# Patient Record
Sex: FEMALE | Race: WHITE | Employment: PART TIME | URBAN - METROPOLITAN AREA
[De-identification: names, ages, dates, MRNs, and addresses within clinical notes are randomized per-mention and may not be internally consistent; named-entity substitution may affect disease eponyms.]

---

## 2017-01-19 ENCOUNTER — OFFICE VISIT (OUTPATIENT)
Dept: INTERNAL MEDICINE CLINIC | Facility: CLINIC | Age: 72
End: 2017-01-19

## 2017-01-19 VITALS
SYSTOLIC BLOOD PRESSURE: 120 MMHG | WEIGHT: 155.75 LBS | BODY MASS INDEX: 28 KG/M2 | TEMPERATURE: 99 F | HEART RATE: 66 BPM | DIASTOLIC BLOOD PRESSURE: 76 MMHG | OXYGEN SATURATION: 98 %

## 2017-01-19 DIAGNOSIS — R19.7 DIARRHEA, UNSPECIFIED TYPE: Primary | ICD-10-CM

## 2017-01-19 DIAGNOSIS — J06.9 URI, ACUTE: ICD-10-CM

## 2017-01-19 DIAGNOSIS — K58.9 IRRITABLE BOWEL SYNDROME, UNSPECIFIED TYPE: ICD-10-CM

## 2017-01-19 PROCEDURE — 99214 OFFICE O/P EST MOD 30 MIN: CPT | Performed by: FAMILY MEDICINE

## 2017-01-19 NOTE — PROGRESS NOTES
CHIEF COMPLAINT:   Patient presents with:  Diarrhea: intermittent x 2 weeks associated with abdominal soreness, no nausea, no vomiting. HPI:   Caleb Danielson is a 70year old female who presents for complaints of diarrhea.   Symptoms have be mouth every evening. Disp:  Rfl:    Probiotic Oral Cap Take 1 capsule by mouth daily. Disp:  Rfl:    Metoprolol Tartrate 50 MG Oral Tab Take 1 tablet (50 mg total) by mouth daily.  Disp: 90 tablet Rfl: 1   HydrOXYzine Pamoate 25 MG Oral Cap TAKE ONE CAPSULE patient. NEURO: denies headaches, loss of bowel or bladder control    EXAM:   /76 mmHg  Pulse 66  Temp(Src) 98.6 °F (37 °C) (Oral)  Wt 155 lb 12 oz  SpO2 98%  LMP 03/16/2000 (Approximate)  Breastfeeding?  No  GENERAL: well developed, well nourished type  Marquita Box, acute      Orders Placed This Encounter  H. PYLORI STOOL AG, EIA [id number]  Giardia, Eia;  Ova/Parasite  STOOL CULTURE    Meds & Refills for this Visit:    No prescriptions requested or ordered in this encounter       Imaging & Consults:  None

## 2017-01-31 ENCOUNTER — OFFICE VISIT (OUTPATIENT)
Dept: HEMATOLOGY/ONCOLOGY | Facility: HOSPITAL | Age: 72
End: 2017-01-31
Attending: ALLERGY & IMMUNOLOGY
Payer: MEDICARE

## 2017-01-31 VITALS — HEART RATE: 53 BPM | SYSTOLIC BLOOD PRESSURE: 114 MMHG | DIASTOLIC BLOOD PRESSURE: 70 MMHG | TEMPERATURE: 98 F

## 2017-01-31 DIAGNOSIS — L50.1 IDIOPATHIC URTICARIA: Primary | ICD-10-CM

## 2017-01-31 PROCEDURE — 96401 CHEMO ANTI-NEOPL SQ/IM: CPT

## 2017-01-31 NOTE — PROGRESS NOTES
Education Record    Learner:  Patient    Disease / Tarri Bonds    Barriers / Limitations:  None   Comments:    Method:  Brief focused   Comments:    General Topics:  Medication, Procedure and Side effects and symptom management   Comments:    Outcome:

## 2017-03-06 ENCOUNTER — HOSPITAL ENCOUNTER (OUTPATIENT)
Age: 72
Discharge: HOME | End: 2017-03-06
Payer: MEDICARE

## 2017-03-06 ENCOUNTER — APPOINTMENT (OUTPATIENT)
Dept: CT IMAGING | Age: 72
End: 2017-03-06
Attending: PHYSICIAN ASSISTANT
Payer: MEDICARE

## 2017-03-06 VITALS
TEMPERATURE: 98 F | OXYGEN SATURATION: 99 % | RESPIRATION RATE: 18 BRPM | HEART RATE: 57 BPM | DIASTOLIC BLOOD PRESSURE: 77 MMHG | SYSTOLIC BLOOD PRESSURE: 126 MMHG

## 2017-03-06 DIAGNOSIS — K57.92 ACUTE DIVERTICULITIS: Primary | ICD-10-CM

## 2017-03-06 LAB
#LYMPHOCYTE IC: 1.5 X10ˆ3/UL (ref 0.9–3.2)
#MXD IC: 1.5 X10ˆ3/UL (ref 0.1–1)
#NEUTROPHIL IC: 7.5 X10ˆ3/UL (ref 1.3–6.7)
CREAT SERPL-MCNC: 0.8 MG/DL (ref 0.4–1)
GLUCOSE BLD-MCNC: 100 MG/DL (ref 65–99)
HCT IC: 37.1 % (ref 37–54)
HGB IC: 12.9 G/DL (ref 11.7–16)
ISTAT BLOOD GAS TCO2: 23 MMOL/L (ref 22–32)
ISTAT BUN: 11 MG/DL (ref 8–20)
ISTAT CHLORIDE: 107 MMOL/L (ref 101–111)
ISTAT HEMATOCRIT: 38 % (ref 37–54)
ISTAT IONIZED CALCIUM: 1.29 MMOL/L (ref 1.12–1.32)
ISTAT POTASSIUM: 4.2 MMOL/L (ref 3.6–5.1)
ISTAT SODIUM: 142 MMOL/L (ref 136–144)
LYMPHOCYTES NFR BLD AUTO: 14.6 %
MCH IC: 31.6 PG (ref 27–33.2)
MCHC IC: 34.8 G/DL (ref 31–37)
MCV IC: 90.9 FL (ref 81–100)
MIXED CELL %: 14.2 %
NEUTROPHILS NFR BLD AUTO: 71.2 %
PLT IC: 218 X10ˆ3/UL (ref 150–450)
POCT BILIRUBIN URINE: NEGATIVE
POCT GLUCOSE URINE: NEGATIVE MG/DL
POCT KETONE URINE: NEGATIVE MG/DL
POCT NITRITE URINE: NEGATIVE
POCT PH URINE: 5.5 (ref 5–8)
POCT PROTEIN URINE: NEGATIVE MG/DL
POCT SPECIFIC GRAVITY URINE: 1.02
POCT URINE CLARITY: CLEAR
POCT URINE COLOR: YELLOW
POCT URINE PREGNANCY: NEGATIVE
POCT UROBILINOGEN URINE: 0.2 MG/DL
RBC IC: 4.08 X10ˆ6/UL (ref 3.8–5.1)
WBC IC: 10.5 X10ˆ3/UL (ref 4–13)

## 2017-03-06 PROCEDURE — 74176 CT ABD & PELVIS W/O CONTRAST: CPT

## 2017-03-06 PROCEDURE — 87086 URINE CULTURE/COLONY COUNT: CPT | Performed by: PHYSICIAN ASSISTANT

## 2017-03-06 PROCEDURE — 99214 OFFICE O/P EST MOD 30 MIN: CPT

## 2017-03-06 PROCEDURE — 80047 BASIC METABLC PNL IONIZED CA: CPT

## 2017-03-06 PROCEDURE — 85025 COMPLETE CBC W/AUTO DIFF WBC: CPT | Performed by: PHYSICIAN ASSISTANT

## 2017-03-06 PROCEDURE — 96360 HYDRATION IV INFUSION INIT: CPT

## 2017-03-06 PROCEDURE — 81025 URINE PREGNANCY TEST: CPT | Performed by: PHYSICIAN ASSISTANT

## 2017-03-06 PROCEDURE — 81002 URINALYSIS NONAUTO W/O SCOPE: CPT | Performed by: PHYSICIAN ASSISTANT

## 2017-03-06 RX ORDER — METRONIDAZOLE 500 MG/1
500 TABLET ORAL 3 TIMES DAILY
Qty: 30 TABLET | Refills: 0 | Status: SHIPPED | OUTPATIENT
Start: 2017-03-06 | End: 2017-03-16

## 2017-03-06 RX ORDER — SODIUM CHLORIDE 9 MG/ML
INJECTION, SOLUTION INTRAVENOUS ONCE
Status: COMPLETED | OUTPATIENT
Start: 2017-03-06 | End: 2017-03-06

## 2017-03-06 RX ORDER — CIPROFLOXACIN 500 MG/1
500 TABLET, FILM COATED ORAL 2 TIMES DAILY
Qty: 20 TABLET | Refills: 0 | Status: SHIPPED | OUTPATIENT
Start: 2017-03-06 | End: 2017-03-16

## 2017-03-06 NOTE — ED PROVIDER NOTES
Patient Seen in: THE [facility] Immediate Care In [facility] END    History   Patient presents with:  Diarrhea  Fever    Stated Complaint: FEVER / Mitchael Caleb / DIARRHEA X 5 DAYS    HPI    Patient is a 80-year-old female.   Patient has a long history of battling in VENTRAL HERNIA REPAIR N/A 3/26/2015    Comment Procedure: HERNIA VENTRAL REPAIR;  Surgeon: Reinaldo Wu MD;  Location:  MAIN OR       Medications :   metRONIDAZOLE 500 MG Oral Tab,  Take 1 tablet (500 mg total) by mouth 3 (three) times daily.    Cip Alcohol Use: Yes                Comment: occ      Review of Systems    Positive for stated complaint: FEVER / WEAK /NAUSEA / DIARRHEA X 5 DAYS  Other systems are as noted in HPI. Constitutional and vital signs reviewed.       All other systems reviewed and Small (*)     Leukocyte esterase urine Trace (*)     All other components within normal limits   POCT ISTAT CHEM8 CARTRIDGE - Abnormal; Notable for the following:     ISTAT Glucose 100 (*)     All other components within normal limits   POCT PREGNANCY, URI organs appropriate for patient age. Pessary device noted. BONES:  Normal.  No bony lesion or fracture. LUNG BASES:  Small hiatal hernia. Distal esophageal wall appears thickened, correlate clinically with esophagitis. 3/6/2017  CONCLUSION:  1.  Acut

## 2017-03-06 NOTE — ED INITIAL ASSESSMENT (HPI)
Last Thursday- patient states had 5 bouts of diarrhea. She took Pepto-Bismol and diarrhea subsided. Last Friday- felt nauseated, weak and fever of 99.5 F   Fever became intermittent. Patient states Friday and Saturday slept 12 hours.   Saturday patient

## 2017-03-09 ENCOUNTER — TELEPHONE (OUTPATIENT)
Dept: INTERNAL MEDICINE CLINIC | Facility: CLINIC | Age: 72
End: 2017-03-09

## 2017-03-09 RX ORDER — METOPROLOL TARTRATE 50 MG/1
50 TABLET, FILM COATED ORAL DAILY
Qty: 90 TABLET | Refills: 1 | Status: SHIPPED | OUTPATIENT
Start: 2017-03-09 | End: 2017-09-03

## 2017-03-13 ENCOUNTER — OFFICE VISIT (OUTPATIENT)
Dept: INTERNAL MEDICINE CLINIC | Facility: CLINIC | Age: 72
End: 2017-03-13

## 2017-03-13 VITALS
OXYGEN SATURATION: 100 % | WEIGHT: 156.5 LBS | HEART RATE: 58 BPM | DIASTOLIC BLOOD PRESSURE: 64 MMHG | TEMPERATURE: 98 F | SYSTOLIC BLOOD PRESSURE: 116 MMHG | BODY MASS INDEX: 29 KG/M2

## 2017-03-13 DIAGNOSIS — K57.92 DIVERTICULITIS OF INTESTINE WITHOUT PERFORATION OR ABSCESS WITHOUT BLEEDING, UNSPECIFIED PART OF INTESTINAL TRACT: Primary | ICD-10-CM

## 2017-03-13 PROCEDURE — 99213 OFFICE O/P EST LOW 20 MIN: CPT | Performed by: FAMILY MEDICINE

## 2017-03-13 NOTE — PROGRESS NOTES
CHIEF COMPLAINT:     Patient presents with:  Urgent Care F/u      HPI:   Angelika Oakes is a 70year old female . The patient presents for a recheck after Urgent Care visit for diagnosis of acute diverticulitis. Was seen 7 days ago.  History:  Marisela daily. Disp: 30 tablet Rfl: 0   Ciprofloxacin HCl 500 MG Oral Tab Take 1 tablet (500 mg total) by mouth 2 (two) times daily. Disp: 20 tablet Rfl: 0   omalizumab (XOLAIR) 150 MG Subcutaneous Recon Soln Inject 300 mg into the skin every 30 (thirty) days.  Dis Alcohol Use: Yes                Comment: occ       REVIEW OF SYSTEMS:   GENERAL: Denies fever, chills,weight change, decreased appetite  SKIN: Denies rashes, skin wounds or ulcers.   EYES: Denies blurred vision or double vision  HENT: Denies congestion,

## 2017-03-14 ENCOUNTER — APPOINTMENT (OUTPATIENT)
Dept: HEMATOLOGY/ONCOLOGY | Facility: HOSPITAL | Age: 72
End: 2017-03-14
Attending: ALLERGY & IMMUNOLOGY
Payer: MEDICARE

## 2017-03-15 ENCOUNTER — OFFICE VISIT (OUTPATIENT)
Dept: HEMATOLOGY/ONCOLOGY | Facility: HOSPITAL | Age: 72
End: 2017-03-15
Attending: ALLERGY & IMMUNOLOGY
Payer: MEDICARE

## 2017-03-15 VITALS
HEART RATE: 51 BPM | DIASTOLIC BLOOD PRESSURE: 86 MMHG | RESPIRATION RATE: 16 BRPM | SYSTOLIC BLOOD PRESSURE: 132 MMHG | BODY MASS INDEX: 29 KG/M2 | TEMPERATURE: 98 F | WEIGHT: 157.5 LBS

## 2017-03-15 DIAGNOSIS — L50.1 IDIOPATHIC URTICARIA: Primary | ICD-10-CM

## 2017-03-15 PROCEDURE — 96372 THER/PROPH/DIAG INJ SC/IM: CPT

## 2017-03-15 PROCEDURE — 96401 CHEMO ANTI-NEOPL SQ/IM: CPT

## 2017-03-15 NOTE — PROGRESS NOTES
Patient started on Cipro and Metronidazole on March 6 for diverticulitis. Had to stop Cipro for achilles tendon pain, continues Metronidazole until 3/16 for a total of 10 days.  Spoke to [facility] of Dr. Adrien Romberg, said it's ok to proceed as long as pat

## 2017-04-11 PROBLEM — F41.1 GENERALIZED ANXIETY DISORDER: Status: ACTIVE | Noted: 2017-04-11

## 2017-04-11 PROBLEM — K44.9 DIAPHRAGMATIC HERNIA: Status: ACTIVE | Noted: 2017-04-11

## 2017-04-11 PROBLEM — K57.30 DIVERTICULOSIS OF COLON: Status: ACTIVE | Noted: 2017-04-11

## 2017-04-12 ENCOUNTER — APPOINTMENT (OUTPATIENT)
Dept: HEMATOLOGY/ONCOLOGY | Facility: HOSPITAL | Age: 72
End: 2017-04-12
Attending: ALLERGY & IMMUNOLOGY
Payer: MEDICARE

## 2017-04-13 ENCOUNTER — OFFICE VISIT (OUTPATIENT)
Dept: HEMATOLOGY/ONCOLOGY | Facility: HOSPITAL | Age: 72
End: 2017-04-13
Attending: ALLERGY & IMMUNOLOGY
Payer: MEDICARE

## 2017-04-13 VITALS
TEMPERATURE: 99 F | HEART RATE: 68 BPM | SYSTOLIC BLOOD PRESSURE: 129 MMHG | DIASTOLIC BLOOD PRESSURE: 78 MMHG | RESPIRATION RATE: 18 BRPM

## 2017-04-13 DIAGNOSIS — L50.1 IDIOPATHIC URTICARIA: Primary | ICD-10-CM

## 2017-04-13 PROCEDURE — 96401 CHEMO ANTI-NEOPL SQ/IM: CPT

## 2017-04-13 NOTE — PROGRESS NOTES
Education Record    Learner:  Patient    Disease / Diagnosis:    Barriers / Limitations:  None   Comments:    Method:  Brief focused   Comments:    General Topics:  Medication and Plan of care reviewed   Comments:    Outcome:  Shows understanding   30 min

## 2017-05-04 ENCOUNTER — HOSPITAL ENCOUNTER (OUTPATIENT)
Dept: GENERAL RADIOLOGY | Age: 72
Discharge: HOME | End: 2017-05-04
Attending: FAMILY MEDICINE
Payer: MEDICARE

## 2017-05-04 ENCOUNTER — OFFICE VISIT (OUTPATIENT)
Dept: INTERNAL MEDICINE CLINIC | Facility: CLINIC | Age: 72
End: 2017-05-04

## 2017-05-04 VITALS
OXYGEN SATURATION: 98 % | TEMPERATURE: 99 F | BODY MASS INDEX: 29 KG/M2 | HEART RATE: 65 BPM | WEIGHT: 157.25 LBS | SYSTOLIC BLOOD PRESSURE: 120 MMHG | DIASTOLIC BLOOD PRESSURE: 66 MMHG

## 2017-05-04 DIAGNOSIS — M79.672 LEFT FOOT PAIN: Primary | ICD-10-CM

## 2017-05-04 DIAGNOSIS — H69.82 EUSTACHIAN TUBE DYSFUNCTION, LEFT: ICD-10-CM

## 2017-05-04 DIAGNOSIS — M79.675 GREAT TOE PAIN, LEFT: ICD-10-CM

## 2017-05-04 DIAGNOSIS — M79.672 LEFT FOOT PAIN: ICD-10-CM

## 2017-05-04 DIAGNOSIS — J30.9 ALLERGIC RHINITIS, UNSPECIFIED ALLERGIC RHINITIS TRIGGER, UNSPECIFIED RHINITIS SEASONALITY: ICD-10-CM

## 2017-05-04 DIAGNOSIS — R41.3 MEMORY PROBLEM: ICD-10-CM

## 2017-05-04 PROCEDURE — 99214 OFFICE O/P EST MOD 30 MIN: CPT | Performed by: FAMILY MEDICINE

## 2017-05-04 PROCEDURE — 73630 X-RAY EXAM OF FOOT: CPT | Performed by: FAMILY MEDICINE

## 2017-05-04 RX ORDER — MONTELUKAST SODIUM 10 MG/1
10 TABLET ORAL NIGHTLY
COMMUNITY
End: 2017-09-25

## 2017-05-04 NOTE — PROGRESS NOTES
Patient presents with: Toe Pain: Left big toe pain the last 2 nights. Ear Problem: check left ear; intermittent crackling sound in left ear x 2 weeks; feels like there's water in ear.      HPI:   Pina Curiel is a 70year old female present wit total) by mouth daily. Disp: 90 tablet Rfl: 1   omalizumab (XOLAIR) 150 MG Subcutaneous Recon Soln Inject 300 mg into the skin every 30 (thirty) days. Disp:  Rfl:    QUERCETIN OR Take 1 tablet by mouth 2 (two) times daily.  Disp:  Rfl:    Omega-3 Fatty Acid chills,weight change, decreased appetite. SKIN: Denies rashes, skin wounds or ulcers. EYES: Denies blurred vision or double vision  HENT: Denies facial weakness, sore throat. Denies diminished hearing, aural fullness, or tinnitus. See HPI.   CHEST: Denies nightly. 3. Allergic rhinitis, unspecified allergic rhinitis trigger, unspecified rhinitis seasonality  claritin or zyrtec as needed  - Montelukast Sodium 10 MG Oral Tab; Take 10 mg by mouth nightly.     4. Memory problem  Pt to continue to monitor for t

## 2017-05-11 ENCOUNTER — OFFICE VISIT (OUTPATIENT)
Dept: HEMATOLOGY/ONCOLOGY | Facility: HOSPITAL | Age: 72
End: 2017-05-11
Attending: ALLERGY & IMMUNOLOGY
Payer: MEDICARE

## 2017-05-11 VITALS
DIASTOLIC BLOOD PRESSURE: 64 MMHG | RESPIRATION RATE: 18 BRPM | TEMPERATURE: 97 F | SYSTOLIC BLOOD PRESSURE: 120 MMHG | HEART RATE: 53 BPM

## 2017-05-11 DIAGNOSIS — L50.1 IDIOPATHIC URTICARIA: Primary | ICD-10-CM

## 2017-05-11 PROCEDURE — 96401 CHEMO ANTI-NEOPL SQ/IM: CPT

## 2017-05-11 NOTE — PROGRESS NOTES
Education Record    Learner:  Patient    Disease / Diagnosis: Xolair    Barriers / Limitations:  None   Comments:    Method:  Discussion   Comments:    General Topics:  Medication, Side effects and symptom management and Plan of care reviewed   Comments:

## 2017-05-16 ENCOUNTER — TELEPHONE (OUTPATIENT)
Dept: INTERNAL MEDICINE CLINIC | Facility: CLINIC | Age: 72
End: 2017-05-16

## 2017-05-16 RX ORDER — HYDROXYZINE PAMOATE 25 MG/1
CAPSULE ORAL
Qty: 90 CAPSULE | Refills: 1 | Status: SHIPPED | OUTPATIENT
Start: 2017-05-16 | End: 2017-09-25

## 2017-06-12 ENCOUNTER — APPOINTMENT (OUTPATIENT)
Dept: HEMATOLOGY/ONCOLOGY | Facility: HOSPITAL | Age: 72
End: 2017-06-12
Attending: ALLERGY & IMMUNOLOGY
Payer: MEDICARE

## 2017-06-15 ENCOUNTER — OFFICE VISIT (OUTPATIENT)
Dept: HEMATOLOGY/ONCOLOGY | Facility: HOSPITAL | Age: 72
End: 2017-06-15
Attending: ALLERGY & IMMUNOLOGY
Payer: MEDICARE

## 2017-06-15 VITALS
DIASTOLIC BLOOD PRESSURE: 87 MMHG | RESPIRATION RATE: 18 BRPM | SYSTOLIC BLOOD PRESSURE: 126 MMHG | TEMPERATURE: 99 F | HEART RATE: 57 BPM

## 2017-06-15 DIAGNOSIS — L50.1 IDIOPATHIC URTICARIA: Primary | ICD-10-CM

## 2017-06-15 PROCEDURE — 96401 CHEMO ANTI-NEOPL SQ/IM: CPT

## 2017-06-20 ENCOUNTER — HOSPITAL ENCOUNTER (OUTPATIENT)
Age: 72
Discharge: HOME | End: 2017-06-20
Attending: FAMILY MEDICINE
Payer: MEDICARE

## 2017-06-20 ENCOUNTER — APPOINTMENT (OUTPATIENT)
Dept: GENERAL RADIOLOGY | Age: 72
End: 2017-06-20
Attending: FAMILY MEDICINE
Payer: MEDICARE

## 2017-06-20 VITALS
WEIGHT: 135 LBS | OXYGEN SATURATION: 97 % | RESPIRATION RATE: 16 BRPM | DIASTOLIC BLOOD PRESSURE: 78 MMHG | BODY MASS INDEX: 24 KG/M2 | TEMPERATURE: 99 F | HEART RATE: 58 BPM | SYSTOLIC BLOOD PRESSURE: 124 MMHG

## 2017-06-20 DIAGNOSIS — S50.02XA LEFT ELBOW CONTUSION: ICD-10-CM

## 2017-06-20 DIAGNOSIS — M25.522 LEFT ELBOW PAIN: Primary | ICD-10-CM

## 2017-06-20 PROCEDURE — 73080 X-RAY EXAM OF ELBOW: CPT | Performed by: FAMILY MEDICINE

## 2017-06-20 PROCEDURE — 99214 OFFICE O/P EST MOD 30 MIN: CPT

## 2017-06-20 PROCEDURE — 99213 OFFICE O/P EST LOW 20 MIN: CPT

## 2017-06-21 NOTE — ED INITIAL ASSESSMENT (HPI)
Patient states she knocked her elbow when getting into the plane the other day and it has been painful every since.

## 2017-06-21 NOTE — ED PROVIDER NOTES
Patient Seen in: THE [facility] In [facility]    History   Patient presents with:  Upper Extremity Injury (musculoskeletal)    Stated Complaint: left elbow pain    HPI    This 66-year-old female presents to the office with complaint of left elbow skin every 30 (thirty) days. QUERCETIN OR,  Take 1 tablet by mouth 2 (two) times daily. Omega-3 Fatty Acids (FISH OIL OR),  Take 1-2 capsules by mouth daily. Probiotic Oral Cap,  Take 1 capsule by mouth daily.    MAGNESIUM OR,  Take 1 capsule by mouth 58  Temp(Src) 98.6 °F (37 °C)  Resp 16  Wt 61.236 kg  SpO2 97%  LMP 03/16/2000 (Approximate)        Physical Exam    General: WH/WN/WD, in NAD, but favoring the left arm, A and O times 3  HEAD: Normocephalic, atraumatic  EYES: Sclera anicteric,  conjunctiv

## 2017-07-07 PROBLEM — M18.11 PRIMARY OSTEOARTHRITIS OF FIRST CARPOMETACARPAL JOINT OF RIGHT HAND: Status: ACTIVE | Noted: 2017-07-07

## 2017-07-13 ENCOUNTER — OFFICE VISIT (OUTPATIENT)
Dept: HEMATOLOGY/ONCOLOGY | Facility: HOSPITAL | Age: 72
End: 2017-07-13
Attending: ALLERGY & IMMUNOLOGY
Payer: MEDICARE

## 2017-07-13 VITALS
RESPIRATION RATE: 18 BRPM | SYSTOLIC BLOOD PRESSURE: 123 MMHG | TEMPERATURE: 98 F | OXYGEN SATURATION: 98 % | DIASTOLIC BLOOD PRESSURE: 71 MMHG | HEART RATE: 59 BPM

## 2017-07-13 DIAGNOSIS — L50.1 IDIOPATHIC URTICARIA: Primary | ICD-10-CM

## 2017-07-13 PROCEDURE — 96401 CHEMO ANTI-NEOPL SQ/IM: CPT

## 2017-07-13 PROCEDURE — 96372 THER/PROPH/DIAG INJ SC/IM: CPT

## 2017-07-13 NOTE — PROGRESS NOTES
Pt arrived for xolair inj, pt states adverse S/S and have been less and denies any recent rash flare-ups, pt alert and appears in nad, pt ambulates with steady gait    Education Record    Learner:  Patient    Disease / Kacey Chauhan    Barriers / Cedric Arn

## 2017-08-10 ENCOUNTER — OFFICE VISIT (OUTPATIENT)
Dept: HEMATOLOGY/ONCOLOGY | Facility: HOSPITAL | Age: 72
End: 2017-08-10
Attending: ALLERGY & IMMUNOLOGY
Payer: MEDICARE

## 2017-08-10 VITALS
HEART RATE: 51 BPM | OXYGEN SATURATION: 98 % | DIASTOLIC BLOOD PRESSURE: 61 MMHG | TEMPERATURE: 99 F | SYSTOLIC BLOOD PRESSURE: 105 MMHG | RESPIRATION RATE: 18 BRPM

## 2017-08-10 DIAGNOSIS — L50.1 IDIOPATHIC URTICARIA: Primary | ICD-10-CM

## 2017-08-10 PROCEDURE — 96401 CHEMO ANTI-NEOPL SQ/IM: CPT

## 2017-09-07 RX ORDER — METOPROLOL TARTRATE 50 MG/1
TABLET, FILM COATED ORAL
Qty: 30 TABLET | Refills: 0 | Status: SHIPPED | OUTPATIENT
Start: 2017-09-07 | End: 2017-09-25

## 2017-09-11 ENCOUNTER — OFFICE VISIT (OUTPATIENT)
Dept: HEMATOLOGY/ONCOLOGY | Facility: HOSPITAL | Age: 72
End: 2017-09-11
Attending: ALLERGY & IMMUNOLOGY
Payer: MEDICARE

## 2017-09-11 VITALS
OXYGEN SATURATION: 97 % | DIASTOLIC BLOOD PRESSURE: 70 MMHG | TEMPERATURE: 99 F | SYSTOLIC BLOOD PRESSURE: 120 MMHG | RESPIRATION RATE: 16 BRPM | HEART RATE: 60 BPM

## 2017-09-11 DIAGNOSIS — L50.1 IDIOPATHIC URTICARIA: Primary | ICD-10-CM

## 2017-09-11 PROCEDURE — 96372 THER/PROPH/DIAG INJ SC/IM: CPT

## 2017-09-11 NOTE — PROGRESS NOTES
Education Record    Learner:  Patient    Disease / Diagnosis: Idiopathic urticaria    Barriers / Limitations:  None   Comments:    Method:  Brief focused and Reinforcement   Comments:    General Topics:  Plan of care reviewed   Comments:    Outcome:  Shows

## 2017-09-25 ENCOUNTER — OFFICE VISIT (OUTPATIENT)
Dept: INTERNAL MEDICINE CLINIC | Facility: CLINIC | Age: 72
End: 2017-09-25

## 2017-09-25 VITALS
DIASTOLIC BLOOD PRESSURE: 74 MMHG | RESPIRATION RATE: 12 BRPM | SYSTOLIC BLOOD PRESSURE: 126 MMHG | WEIGHT: 158.5 LBS | TEMPERATURE: 99 F | BODY MASS INDEX: 29 KG/M2 | HEART RATE: 62 BPM | OXYGEN SATURATION: 98 %

## 2017-09-25 DIAGNOSIS — E78.5 HYPERLIPIDEMIA, UNSPECIFIED HYPERLIPIDEMIA TYPE: ICD-10-CM

## 2017-09-25 DIAGNOSIS — L50.9 URTICARIA: ICD-10-CM

## 2017-09-25 DIAGNOSIS — M81.6 LOCALIZED OSTEOPOROSIS WITHOUT CURRENT PATHOLOGICAL FRACTURE: ICD-10-CM

## 2017-09-25 DIAGNOSIS — K21.9 GASTROESOPHAGEAL REFLUX DISEASE, ESOPHAGITIS PRESENCE NOT SPECIFIED: ICD-10-CM

## 2017-09-25 DIAGNOSIS — Z12.31 ENCOUNTER FOR SCREENING MAMMOGRAM FOR MALIGNANT NEOPLASM OF BREAST: ICD-10-CM

## 2017-09-25 DIAGNOSIS — I10 ESSENTIAL HYPERTENSION: Primary | ICD-10-CM

## 2017-09-25 DIAGNOSIS — R73.01 IMPAIRED FASTING GLUCOSE: ICD-10-CM

## 2017-09-25 DIAGNOSIS — M25.522 LEFT ELBOW PAIN: ICD-10-CM

## 2017-09-25 PROCEDURE — 99214 OFFICE O/P EST MOD 30 MIN: CPT | Performed by: FAMILY MEDICINE

## 2017-09-25 RX ORDER — METOPROLOL TARTRATE 50 MG/1
50 TABLET, FILM COATED ORAL
Qty: 90 TABLET | Refills: 1 | Status: SHIPPED | OUTPATIENT
Start: 2017-09-25 | End: 2018-04-03

## 2017-09-25 RX ORDER — HYDROXYZINE PAMOATE 25 MG/1
CAPSULE ORAL
Qty: 90 CAPSULE | Refills: 1 | Status: SHIPPED | OUTPATIENT
Start: 2017-09-25 | End: 2018-05-11

## 2017-10-04 ENCOUNTER — TELEPHONE (OUTPATIENT)
Dept: INTERNAL MEDICINE CLINIC | Facility: CLINIC | Age: 72
End: 2017-10-04

## 2017-10-10 ENCOUNTER — TELEPHONE (OUTPATIENT)
Dept: HEMATOLOGY/ONCOLOGY | Facility: HOSPITAL | Age: 72
End: 2017-10-10

## 2017-10-17 ENCOUNTER — TELEPHONE (OUTPATIENT)
Dept: HEMATOLOGY/ONCOLOGY | Facility: HOSPITAL | Age: 72
End: 2017-10-17

## 2017-10-24 ENCOUNTER — OFFICE VISIT (OUTPATIENT)
Dept: HEMATOLOGY/ONCOLOGY | Facility: HOSPITAL | Age: 72
End: 2017-10-24
Attending: ALLERGY & IMMUNOLOGY
Payer: MEDICARE

## 2017-10-24 VITALS
TEMPERATURE: 98 F | RESPIRATION RATE: 20 BRPM | HEART RATE: 53 BPM | OXYGEN SATURATION: 97 % | DIASTOLIC BLOOD PRESSURE: 81 MMHG | SYSTOLIC BLOOD PRESSURE: 123 MMHG

## 2017-10-24 DIAGNOSIS — L50.1 IDIOPATHIC URTICARIA: Primary | ICD-10-CM

## 2017-10-24 PROCEDURE — 96372 THER/PROPH/DIAG INJ SC/IM: CPT

## 2017-10-30 ENCOUNTER — HOSPITAL ENCOUNTER (OUTPATIENT)
Age: 72
Discharge: HOME | End: 2017-10-30
Attending: FAMILY MEDICINE
Payer: MEDICARE

## 2017-10-30 ENCOUNTER — TELEPHONE (OUTPATIENT)
Dept: INTERNAL MEDICINE CLINIC | Facility: CLINIC | Age: 72
End: 2017-10-30

## 2017-10-30 VITALS
SYSTOLIC BLOOD PRESSURE: 141 MMHG | OXYGEN SATURATION: 97 % | DIASTOLIC BLOOD PRESSURE: 93 MMHG | RESPIRATION RATE: 20 BRPM | WEIGHT: 145 LBS | BODY MASS INDEX: 25.69 KG/M2 | HEIGHT: 63 IN | HEART RATE: 51 BPM | TEMPERATURE: 98 F

## 2017-10-30 DIAGNOSIS — J98.01 ACUTE BRONCHOSPASM: ICD-10-CM

## 2017-10-30 DIAGNOSIS — E78.5 HYPERLIPIDEMIA, UNSPECIFIED HYPERLIPIDEMIA TYPE: Primary | ICD-10-CM

## 2017-10-30 DIAGNOSIS — J06.9 UPPER RESPIRATORY TRACT INFECTION, UNSPECIFIED TYPE: Primary | ICD-10-CM

## 2017-10-30 PROCEDURE — 99213 OFFICE O/P EST LOW 20 MIN: CPT

## 2017-10-30 PROCEDURE — 99214 OFFICE O/P EST MOD 30 MIN: CPT

## 2017-10-30 RX ORDER — ALBUTEROL SULFATE 90 UG/1
2 AEROSOL, METERED RESPIRATORY (INHALATION) EVERY 4 HOURS PRN
Qty: 1 INHALER | Refills: 0 | Status: SHIPPED | OUTPATIENT
Start: 2017-10-30 | End: 2018-07-02

## 2017-10-30 RX ORDER — FLUTICASONE PROPIONATE 50 MCG
SPRAY, SUSPENSION (ML) NASAL
Qty: 1 INHALER | Refills: 0 | Status: SHIPPED | OUTPATIENT
Start: 2017-10-30 | End: 2018-07-02

## 2017-10-30 NOTE — TELEPHONE ENCOUNTER
Lets try her on a low dose Pravastatin 20 mg daily #30 with 5 refills and see how she does.  Recheck lipids in 6 months

## 2017-10-30 NOTE — TELEPHONE ENCOUNTER
Patient called regarding test results that were posted on her MyChart. Patient prefers not to have messages sent through [address]  Please call to give results

## 2017-10-30 NOTE — TELEPHONE ENCOUNTER
Pt informed of all test ( lab results ) Advised to discontinue MY chart if not wanting test results released there. Pt asking if there is something to take for her chol in low dose or with lower side effects ?  She is willing to try

## 2017-10-30 NOTE — ED INITIAL ASSESSMENT (HPI)
Started with cold symptoms 2 weeks ago, with dry cough, post nasal drip, sneezing a little, some wheezing at night. Taking Nyquil with some relief. Denies fever.

## 2017-11-07 ENCOUNTER — HOSPITAL ENCOUNTER (OUTPATIENT)
Dept: MAMMOGRAPHY | Facility: HOSPITAL | Age: 72
Discharge: HOME | End: 2017-11-07
Attending: FAMILY MEDICINE
Payer: MEDICARE

## 2017-11-07 DIAGNOSIS — Z12.31 ENCOUNTER FOR SCREENING MAMMOGRAM FOR MALIGNANT NEOPLASM OF BREAST: ICD-10-CM

## 2017-11-07 PROCEDURE — 77067 SCR MAMMO BI INCL CAD: CPT | Performed by: FAMILY MEDICINE

## 2017-11-07 RX ORDER — PRAVASTATIN SODIUM 20 MG
20 TABLET ORAL NIGHTLY
Qty: 30 TABLET | Refills: 5 | Status: SHIPPED | OUTPATIENT
Start: 2017-11-07 | End: 2018-05-02

## 2017-11-14 ENCOUNTER — TELEPHONE (OUTPATIENT)
Dept: INTERNAL MEDICINE CLINIC | Facility: CLINIC | Age: 72
End: 2017-11-14

## 2017-11-14 NOTE — TELEPHONE ENCOUNTER
Patient called to speak to nurse about pneumonia vaccine if she has record of ever having received?   Patient would like to discuss please call

## 2017-11-28 ENCOUNTER — OFFICE VISIT (OUTPATIENT)
Dept: HEMATOLOGY/ONCOLOGY | Facility: HOSPITAL | Age: 72
End: 2017-11-28
Attending: ALLERGY & IMMUNOLOGY
Payer: MEDICARE

## 2017-11-28 VITALS
SYSTOLIC BLOOD PRESSURE: 113 MMHG | TEMPERATURE: 98 F | RESPIRATION RATE: 18 BRPM | HEART RATE: 59 BPM | DIASTOLIC BLOOD PRESSURE: 69 MMHG

## 2017-11-28 DIAGNOSIS — L50.1 IDIOPATHIC URTICARIA: Primary | ICD-10-CM

## 2017-11-28 PROCEDURE — 96375 TX/PRO/DX INJ NEW DRUG ADDON: CPT

## 2017-11-28 PROCEDURE — 96372 THER/PROPH/DIAG INJ SC/IM: CPT

## 2017-11-28 NOTE — PROGRESS NOTES
Education Record    Learner:  Patient    Disease / Diagnosis: Xolair INJ    Barriers / Limitations:  None   Comments:    Method:  Brief focused   Comments:    General Topics:  Medication, Side effects and symptom management and Plan of care reviewed   Comm

## 2017-11-30 ENCOUNTER — OFFICE VISIT (OUTPATIENT)
Dept: INTERNAL MEDICINE CLINIC | Facility: CLINIC | Age: 72
End: 2017-11-30

## 2017-11-30 VITALS
BODY MASS INDEX: 28 KG/M2 | DIASTOLIC BLOOD PRESSURE: 60 MMHG | WEIGHT: 158.25 LBS | TEMPERATURE: 99 F | SYSTOLIC BLOOD PRESSURE: 96 MMHG | OXYGEN SATURATION: 98 % | RESPIRATION RATE: 16 BRPM | HEART RATE: 60 BPM

## 2017-11-30 DIAGNOSIS — J01.10 ACUTE FRONTAL SINUSITIS, RECURRENCE NOT SPECIFIED: Primary | ICD-10-CM

## 2017-11-30 DIAGNOSIS — J20.9 BRONCHITIS WITH BRONCHOSPASM: ICD-10-CM

## 2017-11-30 PROCEDURE — 99213 OFFICE O/P EST LOW 20 MIN: CPT | Performed by: FAMILY MEDICINE

## 2017-11-30 RX ORDER — AZITHROMYCIN 250 MG/1
TABLET, FILM COATED ORAL
Qty: 6 TABLET | Refills: 0 | Status: SHIPPED | OUTPATIENT
Start: 2017-11-30 | End: 2018-01-22 | Stop reason: ALTCHOICE

## 2017-11-30 RX ORDER — METHYLPREDNISOLONE 4 MG/1
TABLET ORAL
Qty: 1 KIT | Refills: 0 | Status: SHIPPED | OUTPATIENT
Start: 2017-11-30 | End: 2018-01-22 | Stop reason: ALTCHOICE

## 2017-11-30 RX ORDER — CODEINE PHOSPHATE AND GUAIFENESIN 10; 100 MG/5ML; MG/5ML
5 SOLUTION ORAL 4 TIMES DAILY PRN
Qty: 120 ML | Refills: 0 | Status: SHIPPED | OUTPATIENT
Start: 2017-11-30 | End: 2018-01-30 | Stop reason: ALTCHOICE

## 2017-11-30 NOTE — PROGRESS NOTES
CHIEF COMPLAINT:   Patient presents with:  Cough: and mild wheezing x 17 days. HPI:   Joselito Schwartz is a 67year old female who presents for upper respiratory symptoms for  17 days.  Patient reports congestion, cough with cloudy colored sput mouth at onset of migraine. *May repeat in 2 hours. * Disp: 10 tablet Rfl: 4   Multiple Vitamin Oral Tab Take 1 tablet by mouth daily. Disp:  Rfl:    Ergocalciferol (VITAMIN D OR) Take 3 capsules by mouth daily.    Disp:  Rfl:    ZINC ACETATE OR Take 1 tabl wheezing, See HPI  CARDIOVASCULAR: denies chest pain or palpitations   GI: denies N/V/C or abdominal pain  NEURO: sinus headaches    EXAM:   BP 96/60 (BP Location: Right arm, Patient Position: Sitting, Cuff Size: large)   Pulse 60   Temp 98.8 °F (37.1 °C) themselves up at night to help with the coughing. The patient indicates understanding of these issues and agrees to the plan. The patient is asked to call if sx's persist or worsen.

## 2018-01-06 RX ORDER — ALBUTEROL SULFATE 90 UG/1
2 AEROSOL, METERED RESPIRATORY (INHALATION) EVERY 4 HOURS PRN
Qty: 8.5 G | Refills: 1 | Status: SHIPPED | OUTPATIENT
Start: 2018-01-06 | End: 2018-07-02

## 2018-01-09 ENCOUNTER — APPOINTMENT (OUTPATIENT)
Dept: HEMATOLOGY/ONCOLOGY | Facility: HOSPITAL | Age: 73
End: 2018-01-09
Attending: ALLERGY & IMMUNOLOGY
Payer: MEDICARE

## 2018-01-09 ENCOUNTER — HOSPITAL ENCOUNTER (OUTPATIENT)
Age: 73
Discharge: HOME | End: 2018-01-09
Attending: FAMILY MEDICINE
Payer: MEDICARE

## 2018-01-09 ENCOUNTER — TELEPHONE (OUTPATIENT)
Dept: HEMATOLOGY/ONCOLOGY | Facility: HOSPITAL | Age: 73
End: 2018-01-09

## 2018-01-09 VITALS
RESPIRATION RATE: 18 BRPM | DIASTOLIC BLOOD PRESSURE: 57 MMHG | HEART RATE: 88 BPM | SYSTOLIC BLOOD PRESSURE: 131 MMHG | TEMPERATURE: 97 F | OXYGEN SATURATION: 98 %

## 2018-01-09 DIAGNOSIS — K52.9 GASTROENTERITIS: Primary | ICD-10-CM

## 2018-01-09 LAB
#LYMPHOCYTE IC: 1.3 X10ˆ3/UL (ref 0.9–3.2)
#MXD IC: 0.9 X10ˆ3/UL (ref 0.1–1)
#NEUTROPHIL IC: 4.8 X10ˆ3/UL (ref 1.3–6.7)
CREAT SERPL-MCNC: 0.8 MG/DL (ref 0.55–1.02)
GLUCOSE BLD-MCNC: 88 MG/DL (ref 70–99)
HCT IC: 38.1 % (ref 37–54)
HGB IC: 12.9 G/DL (ref 11.7–16)
ISTAT BLOOD GAS TCO2: 23 MMOL/L (ref 22–32)
ISTAT BUN: 5 MG/DL (ref 8–20)
ISTAT CHLORIDE: 109 MMOL/L (ref 101–111)
ISTAT HEMATOCRIT: 36 % (ref 34–50)
ISTAT IONIZED CALCIUM: 0.97 MMOL/L (ref 1.12–1.32)
ISTAT POTASSIUM: 3.7 MMOL/L (ref 3.6–5.1)
ISTAT SODIUM: 140 MMOL/L (ref 136–144)
LYMPHOCYTES NFR BLD AUTO: 19 %
MCH IC: 31.1 PG (ref 27–33.2)
MCHC IC: 33.9 G/DL (ref 31–37)
MCV IC: 91.8 FL (ref 81–100)
MIXED CELL %: 13.1 %
NEUTROPHILS NFR BLD AUTO: 67.9 %
PLT IC: 225 X10ˆ3/UL (ref 150–450)
POCT BLOOD URINE: NEGATIVE
POCT GLUCOSE URINE: NEGATIVE MG/DL
POCT NITRITE URINE: NEGATIVE
POCT PH URINE: 5.5 (ref 5–8)
POCT PROTEIN URINE: 30 MG/DL
POCT SPECIFIC GRAVITY URINE: 1.02
POCT URINE COLOR: YELLOW
POCT UROBILINOGEN URINE: 0.2 MG/DL
RBC IC: 4.15 X10ˆ6/UL (ref 3.8–5.1)
WBC IC: 7 X10ˆ3/UL (ref 4–13)

## 2018-01-09 PROCEDURE — 87086 URINE CULTURE/COLONY COUNT: CPT | Performed by: FAMILY MEDICINE

## 2018-01-09 PROCEDURE — 81002 URINALYSIS NONAUTO W/O SCOPE: CPT | Performed by: FAMILY MEDICINE

## 2018-01-09 PROCEDURE — 96360 HYDRATION IV INFUSION INIT: CPT

## 2018-01-09 PROCEDURE — 80047 BASIC METABLC PNL IONIZED CA: CPT

## 2018-01-09 PROCEDURE — 99214 OFFICE O/P EST MOD 30 MIN: CPT

## 2018-01-09 PROCEDURE — 85025 COMPLETE CBC W/AUTO DIFF WBC: CPT | Performed by: FAMILY MEDICINE

## 2018-01-09 RX ORDER — CIPROFLOXACIN 500 MG/1
500 TABLET, FILM COATED ORAL 2 TIMES DAILY
Qty: 6 TABLET | Refills: 0 | Status: SHIPPED | OUTPATIENT
Start: 2018-01-09 | End: 2018-01-12

## 2018-01-09 RX ORDER — DICYCLOMINE HCL 20 MG
20 TABLET ORAL 4 TIMES DAILY PRN
Qty: 30 TABLET | Refills: 0 | Status: SHIPPED | OUTPATIENT
Start: 2018-01-09 | End: 2018-01-22 | Stop reason: ALTCHOICE

## 2018-01-09 RX ORDER — SODIUM CHLORIDE 9 MG/ML
1000 INJECTION, SOLUTION INTRAVENOUS ONCE
Status: COMPLETED | OUTPATIENT
Start: 2018-01-09 | End: 2018-01-09

## 2018-01-10 NOTE — ED PROVIDER NOTES
Patient Seen in: [facility] In [facility]    History   Patient presents with:  Vomiting  Diarrhea    Stated Complaint: since thursday d n v , fever, weak and dizzy    HPI    51-year-old female with a history of hypertension presents with naus Smokeless tobacco: Never Used                      Alcohol use:  Yes              Comment: occ      Review of Systems    Positive for stated complaint: since thursday d n v , fever, weak and dizzy  Ot Leukocyte esterase urine Moderate (*)     All other components within normal limits   POCT ISTAT CHEM8 CARTRIDGE - Abnormal; Notable for the following:     ISTAT BUN 5 (*)     ISTAT Ionized Calcium 0.97 (*)     All other components within normal limits

## 2018-01-10 NOTE — ED INITIAL ASSESSMENT (HPI)
Diarrhea- started Thursday. Took imodium. Also c/o fever 101.6 on and off. Feels weak. Has diarrhea 10x per day.  Today last bm at 6 am took 2 imodium today,

## 2018-01-20 RX ORDER — RAMELTEON 8 MG/1
8 TABLET ORAL NIGHTLY
Qty: 30 TABLET | Refills: 0 | Status: SHIPPED | OUTPATIENT
Start: 2018-01-20 | End: 2018-03-20

## 2018-01-22 ENCOUNTER — OFFICE VISIT (OUTPATIENT)
Dept: HEMATOLOGY/ONCOLOGY | Facility: HOSPITAL | Age: 73
End: 2018-01-22
Attending: ALLERGY & IMMUNOLOGY
Payer: MEDICARE

## 2018-01-22 VITALS
BODY MASS INDEX: 28 KG/M2 | WEIGHT: 157 LBS | DIASTOLIC BLOOD PRESSURE: 74 MMHG | HEART RATE: 56 BPM | RESPIRATION RATE: 20 BRPM | SYSTOLIC BLOOD PRESSURE: 132 MMHG | OXYGEN SATURATION: 97 % | TEMPERATURE: 99 F

## 2018-01-22 DIAGNOSIS — L50.1 IDIOPATHIC URTICARIA: Primary | ICD-10-CM

## 2018-01-22 PROCEDURE — 96372 THER/PROPH/DIAG INJ SC/IM: CPT

## 2018-01-22 NOTE — PROGRESS NOTES
Education Record    Learner:  Patient    Disease / Diagnosis:IDIOPATHIC URTICARIA    Barriers / Limitations:  None   Comments:    Method:  Brief focused   Comments:    General Topics:  Medication   Comments:  Wilhemena William INJECTION.  PT DUE FOR ANOTHER DOSE IN 6

## 2018-01-24 ENCOUNTER — HOSPITAL ENCOUNTER (OUTPATIENT)
Age: 73
Discharge: HOME | End: 2018-01-24
Payer: MEDICARE

## 2018-01-24 ENCOUNTER — APPOINTMENT (OUTPATIENT)
Dept: GENERAL RADIOLOGY | Age: 73
End: 2018-01-24
Attending: PHYSICIAN ASSISTANT
Payer: MEDICARE

## 2018-01-24 VITALS
TEMPERATURE: 97 F | OXYGEN SATURATION: 97 % | SYSTOLIC BLOOD PRESSURE: 138 MMHG | RESPIRATION RATE: 22 BRPM | HEART RATE: 57 BPM | DIASTOLIC BLOOD PRESSURE: 73 MMHG

## 2018-01-24 DIAGNOSIS — J30.9 ALLERGIC RHINITIS, UNSPECIFIED SEASONALITY, UNSPECIFIED TRIGGER: ICD-10-CM

## 2018-01-24 DIAGNOSIS — J40 WHEEZY BRONCHITIS: Primary | ICD-10-CM

## 2018-01-24 DIAGNOSIS — J18.9 COMMUNITY ACQUIRED PNEUMONIA, UNSPECIFIED LATERALITY: ICD-10-CM

## 2018-01-24 PROCEDURE — 94640 AIRWAY INHALATION TREATMENT: CPT

## 2018-01-24 PROCEDURE — 71046 X-RAY EXAM CHEST 2 VIEWS: CPT | Performed by: PHYSICIAN ASSISTANT

## 2018-01-24 PROCEDURE — 99214 OFFICE O/P EST MOD 30 MIN: CPT

## 2018-01-24 RX ORDER — IPRATROPIUM BROMIDE AND ALBUTEROL SULFATE 2.5; .5 MG/3ML; MG/3ML
3 SOLUTION RESPIRATORY (INHALATION) ONCE
Status: COMPLETED | OUTPATIENT
Start: 2018-01-24 | End: 2018-01-24

## 2018-01-24 RX ORDER — PREDNISONE 20 MG/1
60 TABLET ORAL ONCE
Status: COMPLETED | OUTPATIENT
Start: 2018-01-24 | End: 2018-01-24

## 2018-01-24 RX ORDER — PREDNISONE 20 MG/1
40 TABLET ORAL DAILY
Qty: 8 TABLET | Refills: 0 | Status: SHIPPED | OUTPATIENT
Start: 2018-01-24 | End: 2018-01-28

## 2018-01-24 RX ORDER — AZITHROMYCIN 250 MG/1
TABLET, FILM COATED ORAL
Qty: 1 PACKAGE | Refills: 0 | Status: SHIPPED | OUTPATIENT
Start: 2018-01-24 | End: 2018-01-29

## 2018-01-24 RX ORDER — LEVOFLOXACIN 500 MG/1
500 TABLET, FILM COATED ORAL DAILY
Qty: 10 TABLET | Refills: 0 | Status: SHIPPED | OUTPATIENT
Start: 2018-01-24 | End: 2018-02-03

## 2018-01-24 RX ORDER — ALBUTEROL SULFATE 90 UG/1
2 AEROSOL, METERED RESPIRATORY (INHALATION) EVERY 4 HOURS PRN
Qty: 1 INHALER | Refills: 0 | Status: SHIPPED | OUTPATIENT
Start: 2018-01-24 | End: 2018-02-23

## 2018-01-25 RX ORDER — PROMETHAZINE HYDROCHLORIDE AND CODEINE PHOSPHATE 6.25; 1 MG/5ML; MG/5ML
5 SYRUP ORAL NIGHTLY PRN
Qty: 120 ML | Refills: 0 | Status: SHIPPED | OUTPATIENT
Start: 2018-01-25 | End: 2018-02-04

## 2018-01-25 NOTE — ED PROVIDER NOTES
1/25/18 14:08    Patient contacted the office requesting a refill on her prescription cough syrup with codeine. Prescription for Phenergan with codeine was given. She may take 5 mL at bedtime as needed for cough.   The patient was advised she can

## 2018-01-25 NOTE — ED INITIAL ASSESSMENT (HPI)
Patient presents with cc of cough for the past 6-8 weeks which has worsened for last couple of days. No fever noted. +Sinus drip.

## 2018-01-25 NOTE — ED PROVIDER NOTES
Patient Seen in: THE MEDICAL [facility] Immediate Care In [facility]    History   Patient presents with:  Cough    Stated Complaint: cough     HPI    27-year-old female here with complaint of a wheezy cough for several weeks.   Patient reports is gotten worse in the pas indicated as above. Smoking status: Never Smoker                                                              Smokeless tobacco: Never Used                      Alcohol use:  Yes              Comment: occ      Review of Systems    Positive for stated compl (cpt=71046)    Result Date: 1/24/2018  PROCEDURE:  XR CHEST PA + LAT CHEST (CPT=71046)  INDICATIONS:  cough  COMPARISON:  JOSE MARTIN XR CHEST PA + LAT CHEST (CPT=71020), 4/24/2016, 16:17.  TECHNIQUE:  PA and lateral chest radiographs were obtained.   NIK laterality    Disposition:  Discharge  1/24/2018  9:47 pm    Follow-up:  Osvaldo Ramsay MD  [address]  [phone]    Schedule an appointment as soon as possible for a visit          Medications Prescribed:  Current Disc

## 2018-01-25 NOTE — ED NOTES
Call placed to pt in response to [facility] text. Pt's question:\"I am nearly out of cough syrup with codeine that was the only thing that could stop the hard coughing and allow me to sleep. \"  I called pt, she has not had any tessalon pearle prescription

## 2018-01-25 NOTE — ED NOTES
Dr. Jeane Albrecht reviewed pt's chart and pts' request. Dr. Jeane Albrecht prescribed requested cough med. Call placed to pt, prescription for cough medicine with codeine is here for pt to . Pt agreeable, will be in later today.

## 2018-01-30 ENCOUNTER — OFFICE VISIT (OUTPATIENT)
Dept: INTERNAL MEDICINE CLINIC | Facility: CLINIC | Age: 73
End: 2018-01-30

## 2018-01-30 VITALS
RESPIRATION RATE: 16 BRPM | TEMPERATURE: 99 F | WEIGHT: 154 LBS | BODY MASS INDEX: 27 KG/M2 | OXYGEN SATURATION: 98 % | DIASTOLIC BLOOD PRESSURE: 74 MMHG | SYSTOLIC BLOOD PRESSURE: 130 MMHG | HEART RATE: 58 BPM

## 2018-01-30 DIAGNOSIS — J18.9 COMMUNITY ACQUIRED PNEUMONIA OF LEFT LOWER LOBE OF LUNG: Primary | ICD-10-CM

## 2018-01-30 PROCEDURE — 99213 OFFICE O/P EST LOW 20 MIN: CPT | Performed by: FAMILY MEDICINE

## 2018-01-30 NOTE — PROGRESS NOTES
CHIEF COMPLAINT:   Patient presents with:  Urgent Care F/u: 1/24/18 dt pneumonia. HPI:   The patient presents for a recheck after Urgent Care visit for diagnosis of pneumonia. Was seen 6 days ago.  History: Pt had complaints of a wheezy cough for alethea Oral Tab Take 1 tablet (20 mg total) by mouth nightly. Disp: 30 tablet Rfl: 5   Albuterol Sulfate  (90 Base) MCG/ACT Inhalation Aero Soln Inhale 2 puffs into the lungs every 4 (four) hours as needed for Wheezing or Shortness of Breath.  CARRY YOUR IN hypertension    • Heart murmur    • Hernia, hiatal    • High blood pressure    • History of cardiac cath 3/2010    normal   • Hives       Past Surgical History:  2001, 1/12, 10/17: COLONOSCOPY  10/19/2017: COLONOSCOPY N/A      Comment: Procedure: Amy Mccarthy retraction,+ fluid  NOSE: Nostrils patent, clear nasal discharge, nasal mucosa deep pink  THROAT: Oral mucosa pink, moist. Posterior pharynx is minimal erythematous. PND,no exudates.      NECK: Supple, non-tender  LUNGS: clear to auscultation bilaterally, n

## 2018-02-21 ENCOUNTER — HOSPITAL ENCOUNTER (OUTPATIENT)
Dept: GENERAL RADIOLOGY | Age: 73
Discharge: HOME | End: 2018-02-21
Attending: FAMILY MEDICINE
Payer: MEDICARE

## 2018-02-21 DIAGNOSIS — J18.9 COMMUNITY ACQUIRED PNEUMONIA OF LEFT LOWER LOBE OF LUNG: ICD-10-CM

## 2018-02-21 PROCEDURE — 71046 X-RAY EXAM CHEST 2 VIEWS: CPT | Performed by: FAMILY MEDICINE

## 2018-03-05 ENCOUNTER — TELEPHONE (OUTPATIENT)
Dept: HEMATOLOGY/ONCOLOGY | Facility: HOSPITAL | Age: 73
End: 2018-03-05

## 2018-03-09 ENCOUNTER — OFFICE VISIT (OUTPATIENT)
Dept: HEMATOLOGY/ONCOLOGY | Facility: HOSPITAL | Age: 73
End: 2018-03-09
Attending: ALLERGY & IMMUNOLOGY
Payer: MEDICARE

## 2018-03-09 DIAGNOSIS — L50.1 IDIOPATHIC URTICARIA: Primary | ICD-10-CM

## 2018-03-09 PROCEDURE — 96372 THER/PROPH/DIAG INJ SC/IM: CPT

## 2018-03-09 NOTE — PROGRESS NOTES
Education Record    Learner:  Patient    Disease / Diagnosis:  Idiopathic urticaria    Barriers / Limitations:  None   Comments:    Method:  Brief focused   Comments:    General Topics:  Medication, Procedure, Side effects and symptom management and Plan o

## 2018-03-20 RX ORDER — RAMELTEON 8 MG/1
8 TABLET ORAL NIGHTLY
Qty: 30 TABLET | Refills: 0 | Status: SHIPPED | OUTPATIENT
Start: 2018-03-20 | End: 2018-05-07

## 2018-03-20 NOTE — TELEPHONE ENCOUNTER
Medication(s) to Refill:   Pending Prescriptions Disp Refills    ramelteon (ROZEREM) 8 MG Oral Tab 30 tablet 0     Sig: Take 1 tablet (8 mg total) by mouth nightly.          Reason for Medication Refill being sent to Provider / Reason Protocol Failed: Contr

## 2018-04-03 DIAGNOSIS — I10 ESSENTIAL HYPERTENSION: ICD-10-CM

## 2018-04-04 RX ORDER — METOPROLOL TARTRATE 50 MG/1
TABLET, FILM COATED ORAL
Qty: 90 TABLET | Refills: 0 | Status: SHIPPED | OUTPATIENT
Start: 2018-04-04 | End: 2018-07-02

## 2018-05-02 RX ORDER — PRAVASTATIN SODIUM 20 MG
TABLET ORAL
Qty: 30 TABLET | Refills: 3 | Status: SHIPPED | OUTPATIENT
Start: 2018-05-02 | End: 2018-07-02 | Stop reason: SINTOL

## 2018-05-07 ENCOUNTER — TELEPHONE (OUTPATIENT)
Dept: INTERNAL MEDICINE CLINIC | Facility: CLINIC | Age: 73
End: 2018-05-07

## 2018-05-07 RX ORDER — RAMELTEON 8 MG/1
8 TABLET ORAL NIGHTLY
Qty: 30 TABLET | Refills: 2 | COMMUNITY
Start: 2018-05-07 | End: 2018-12-10

## 2018-05-11 DIAGNOSIS — L50.9 URTICARIA: ICD-10-CM

## 2018-05-11 NOTE — TELEPHONE ENCOUNTER
[place] [facility] in [facility] called on behalf of patient requesting refill for HydrOXYzine Pamoate 25 MG Oral Cap    [id number] - [place] - [address] [phone], [phone]

## 2018-05-12 RX ORDER — HYDROXYZINE PAMOATE 25 MG/1
CAPSULE ORAL
Qty: 90 CAPSULE | Refills: 0 | Status: SHIPPED | OUTPATIENT
Start: 2018-05-12 | End: 2018-09-05

## 2018-05-12 NOTE — TELEPHONE ENCOUNTER
Medication(s) to Refill:   Pending Prescriptions Disp Refills    HYDROXYZINE PAMOATE 25 MG Oral Cap [Pharmacy Med Name: HydrOXYzine Pamoate Oral Capsule 25 MG] 90 capsule 0     Sig: TAKE ONE CAPSULE BY MOUTH DAILY AS NEEDED FOR ITCHING              Reason

## 2018-05-12 NOTE — TELEPHONE ENCOUNTER
Medication(s) to Refill:   Pending Prescriptions Disp Refills    HydrOXYzine Pamoate 25 MG Oral Cap 90 capsule 1     Sig: TAKE ONE CAPSULE BY MOUTH EVERY DAY AS NEEDED FOR ITCHING             Reason for Medication Refill being sent to Provider / Reason Pro

## 2018-05-13 RX ORDER — HYDROXYZINE PAMOATE 25 MG/1
CAPSULE ORAL
Qty: 90 CAPSULE | Refills: 1 | Status: SHIPPED | OUTPATIENT
Start: 2018-05-13 | End: 2018-08-15

## 2018-05-23 ENCOUNTER — TELEPHONE (OUTPATIENT)
Dept: INTERNAL MEDICINE CLINIC | Facility: CLINIC | Age: 73
End: 2018-05-23

## 2018-05-23 NOTE — TELEPHONE ENCOUNTER
Patient calling in requesting a prescription for her intestine issues. Please nguyen patient with questions.

## 2018-07-02 ENCOUNTER — OFFICE VISIT (OUTPATIENT)
Dept: INTERNAL MEDICINE CLINIC | Facility: CLINIC | Age: 73
End: 2018-07-02

## 2018-07-02 VITALS
TEMPERATURE: 99 F | HEART RATE: 64 BPM | RESPIRATION RATE: 12 BRPM | DIASTOLIC BLOOD PRESSURE: 74 MMHG | WEIGHT: 156.5 LBS | OXYGEN SATURATION: 98 % | BODY MASS INDEX: 28 KG/M2 | SYSTOLIC BLOOD PRESSURE: 126 MMHG

## 2018-07-02 DIAGNOSIS — G47.00 INSOMNIA, UNSPECIFIED TYPE: ICD-10-CM

## 2018-07-02 DIAGNOSIS — I10 ESSENTIAL HYPERTENSION: Primary | ICD-10-CM

## 2018-07-02 DIAGNOSIS — E78.5 HYPERLIPIDEMIA, UNSPECIFIED HYPERLIPIDEMIA TYPE: ICD-10-CM

## 2018-07-02 DIAGNOSIS — R73.01 IMPAIRED FASTING GLUCOSE: ICD-10-CM

## 2018-07-02 PROCEDURE — 99214 OFFICE O/P EST MOD 30 MIN: CPT | Performed by: FAMILY MEDICINE

## 2018-07-02 RX ORDER — METOPROLOL TARTRATE 50 MG/1
50 TABLET, FILM COATED ORAL
Qty: 90 TABLET | Refills: 0 | Status: SHIPPED | OUTPATIENT
Start: 2018-07-02 | End: 2018-08-15

## 2018-07-02 RX ORDER — TEMAZEPAM 30 MG/1
30 CAPSULE ORAL NIGHTLY PRN
Qty: 7 CAPSULE | Refills: 0 | Status: SHIPPED | OUTPATIENT
Start: 2018-07-02 | End: 2018-08-15

## 2018-07-02 NOTE — PROGRESS NOTES
CHIEF COMPLAINT:     Patient presents with:  Medication Follow-Up      HPI:   Parminder Angel is a 68year old female   Patient presents for recheck of  Hypertension and hyperlipdemia.  Pt has been taking medications as instructed, no medication side 1 tablet by mouth 2 (two) times daily. Disp:  Rfl:    Omega-3 Fatty Acids (FISH OIL OR) Take 1-2 capsules by mouth daily. Disp:  Rfl:    Probiotic Oral Cap Take 1 capsule by mouth daily. Disp:  Rfl:    MAGNESIUM OR Take 1 capsule by mouth daily.  Disp:  Rfl 98.5 °F (36.9 °C) (Oral)   Resp 12   Wt 156 lb 8 oz   LMP 03/16/2000 (Approximate)   SpO2 98%   Breastfeeding?  No   BMI 28.17 kg/m²   GENERAL: well developed, well nourished,in no apparent distress  SKIN: no rashes,no suspicious lesions  HEAD: atraumatic, fasting glucose  - Pt to watch starchy/carb foods as they can add to sugar load and try to increase activity especially walking as you will burn up the sugars better. The patient indicates understanding of these issues and agrees to the plan.   The rashawn

## 2018-07-25 ENCOUNTER — LAB ENCOUNTER (OUTPATIENT)
Dept: LAB | Age: 73
End: 2018-07-25
Attending: FAMILY MEDICINE
Payer: MEDICARE

## 2018-07-25 DIAGNOSIS — E78.5 HYPERLIPIDEMIA, UNSPECIFIED HYPERLIPIDEMIA TYPE: ICD-10-CM

## 2018-07-25 DIAGNOSIS — R73.01 IMPAIRED FASTING GLUCOSE: ICD-10-CM

## 2018-07-25 DIAGNOSIS — K52.9 CHRONIC DIARRHEA: ICD-10-CM

## 2018-07-25 LAB
ALT SERPL-CCNC: 26 U/L (ref 14–54)
AST SERPL-CCNC: 19 U/L (ref 15–41)
CHOLEST SMN-MCNC: 200 MG/DL (ref ?–200)
EST. AVERAGE GLUCOSE BLD GHB EST-MCNC: 123 MG/DL (ref 68–126)
HBA1C MFR BLD HPLC: 5.9 % (ref ?–5.7)
HDLC SERPL-MCNC: 56 MG/DL (ref 40–59)
IMMUNOGLOBULIN A: 143 MG/DL (ref 70–312)
LDLC SERPL CALC-MCNC: 114 MG/DL (ref ?–100)
NONHDLC SERPL-MCNC: 144 MG/DL (ref ?–130)
TRIGL SERPL-MCNC: 148 MG/DL (ref 30–149)
VLDLC SERPL CALC-MCNC: 30 MG/DL (ref 0–30)

## 2018-07-25 PROCEDURE — 36415 COLL VENOUS BLD VENIPUNCTURE: CPT

## 2018-07-25 PROCEDURE — 82784 ASSAY IGA/IGD/IGG/IGM EACH: CPT

## 2018-07-25 PROCEDURE — 84450 TRANSFERASE (AST) (SGOT): CPT

## 2018-07-25 PROCEDURE — 80061 LIPID PANEL: CPT

## 2018-07-25 PROCEDURE — 83036 HEMOGLOBIN GLYCOSYLATED A1C: CPT

## 2018-07-25 PROCEDURE — 84460 ALANINE AMINO (ALT) (SGPT): CPT

## 2018-07-25 PROCEDURE — 83516 IMMUNOASSAY NONANTIBODY: CPT

## 2018-07-30 ENCOUNTER — TELEPHONE (OUTPATIENT)
Dept: INTERNAL MEDICINE CLINIC | Facility: CLINIC | Age: 73
End: 2018-07-30

## 2018-07-30 ENCOUNTER — OFFICE VISIT (OUTPATIENT)
Dept: INTERNAL MEDICINE CLINIC | Facility: CLINIC | Age: 73
End: 2018-07-30

## 2018-07-30 VITALS — BODY MASS INDEX: 28 KG/M2 | WEIGHT: 155.5 LBS

## 2018-07-30 DIAGNOSIS — R73.09 ELEVATED HEMOGLOBIN A1C: Primary | ICD-10-CM

## 2018-07-30 DIAGNOSIS — E78.5 HYPERLIPIDEMIA, UNSPECIFIED HYPERLIPIDEMIA TYPE: ICD-10-CM

## 2018-07-30 DIAGNOSIS — Z02.9 ENCOUNTER FOR ADMINISTRATIVE EXAMINATIONS: Primary | ICD-10-CM

## 2018-07-31 LAB
TISSUE TRANSGLUTAMINASE AB,IGA: <1.2 U/ML (ref ?–15)
TISSUE TRANSGLUTAMINASE IGA QUALITATIVE: NEGATIVE

## 2018-08-05 NOTE — PROGRESS NOTES
8/5/2018  Mina Alonzo  [address]  [place] [id number]    Dear Tianna,       Here are your results from your recent visit with Gastroenterology.      Your blood testing was negative for celiac disease: an allergy to gluten which is primari

## 2018-08-07 ENCOUNTER — TELEPHONE (OUTPATIENT)
Dept: INTERNAL MEDICINE CLINIC | Facility: CLINIC | Age: 73
End: 2018-08-07

## 2018-08-07 NOTE — TELEPHONE ENCOUNTER
Patient traveling to [facility] Sept 13 - Oct 27. Was advised by a  to have Hep A and flu vaccine prior to traveling    Is MMR vaccine current ?      Will need 6 weeks of current meds while she is traveling:  Metoprolol Tartrate  Hydroxyzine

## 2018-08-15 ENCOUNTER — NURSE ONLY (OUTPATIENT)
Dept: INTERNAL MEDICINE CLINIC | Facility: CLINIC | Age: 73
End: 2018-08-15
Payer: MEDICARE

## 2018-08-15 DIAGNOSIS — I10 ESSENTIAL HYPERTENSION: ICD-10-CM

## 2018-08-15 DIAGNOSIS — L50.9 URTICARIA: ICD-10-CM

## 2018-08-15 DIAGNOSIS — G47.00 INSOMNIA, UNSPECIFIED TYPE: ICD-10-CM

## 2018-08-15 PROCEDURE — 90471 IMMUNIZATION ADMIN: CPT | Performed by: FAMILY MEDICINE

## 2018-08-15 PROCEDURE — 90636 HEP A/HEP B VACC ADULT IM: CPT | Performed by: FAMILY MEDICINE

## 2018-08-15 RX ORDER — HYDROXYZINE PAMOATE 25 MG/1
CAPSULE ORAL
Qty: 90 CAPSULE | Refills: 0 | Status: SHIPPED | OUTPATIENT
Start: 2018-08-15 | End: 2018-09-04

## 2018-08-15 RX ORDER — TEMAZEPAM 30 MG/1
30 CAPSULE ORAL NIGHTLY PRN
Qty: 30 CAPSULE | Refills: 0 | Status: SHIPPED | OUTPATIENT
Start: 2018-08-15 | End: 2018-10-02

## 2018-08-15 RX ORDER — RAMELTEON 8 MG/1
8 TABLET ORAL NIGHTLY
Qty: 90 TABLET | Refills: 1 | OUTPATIENT
Start: 2018-08-15

## 2018-08-15 RX ORDER — METOPROLOL TARTRATE 50 MG/1
50 TABLET, FILM COATED ORAL
Qty: 90 TABLET | Refills: 0 | Status: SHIPPED | OUTPATIENT
Start: 2018-08-15 | End: 2018-09-04

## 2018-08-15 NOTE — TELEPHONE ENCOUNTER
Patient requesting medication refills; going to [place] for 6 weeks and does not want to run out. Also, requesting Hepatitis A vaccine.   States she received her TdaP 6/2016 and Prevnar-13 April 2018; patient will call [facility] to request this info faxed to our

## 2018-08-15 NOTE — TELEPHONE ENCOUNTER
Refill requested: Rozerem 8 mg     Failed protocol      Last refill: 5/7/18 #30 2R    Relevant Labs: NA  Last OV / RTC advised: 7/2/18  RTC 6 months     Appt Scheduled: yes  Your appointments     Date & Time Appointment Department [facility])    Aug 15, 201

## 2018-08-15 NOTE — TELEPHONE ENCOUNTER
ramelteon ([facility]) [address], [place] - [address] [phone], [phone]

## 2018-09-04 DIAGNOSIS — L50.9 URTICARIA: ICD-10-CM

## 2018-09-04 DIAGNOSIS — I10 ESSENTIAL HYPERTENSION: ICD-10-CM

## 2018-09-04 NOTE — TELEPHONE ENCOUNTER
HydrOXYzine Pamoate 25 MG Oral Cap and Metoprolol Tartrate 50 MG Oral Tab please resend 90 days to [facility] they did not receive

## 2018-09-05 RX ORDER — METOPROLOL TARTRATE 50 MG/1
50 TABLET, FILM COATED ORAL
Qty: 90 TABLET | Refills: 0 | Status: SHIPPED | OUTPATIENT
Start: 2018-09-05 | End: 2019-07-23

## 2018-09-05 RX ORDER — HYDROXYZINE PAMOATE 25 MG/1
CAPSULE ORAL
Qty: 90 CAPSULE | Refills: 0 | Status: SHIPPED | OUTPATIENT
Start: 2018-09-05 | End: 2019-07-23

## 2018-09-25 DIAGNOSIS — G47.00 INSOMNIA, UNSPECIFIED TYPE: ICD-10-CM

## 2018-09-25 NOTE — TELEPHONE ENCOUNTER
Left detailed message on patient's voicemail regarding [place] [facility] recommendation and to call our office back if further questions.

## 2018-09-25 NOTE — TELEPHONE ENCOUNTER
Patient is in [facility] for six weeks. Before she left she asked for temazepam (RESTORIL) 30 MG Oral Cap and she is out.   She is asking how to get to her another 2 week supply and her  on trip stated if Di Germain could email another two

## 2018-10-02 RX ORDER — TEMAZEPAM 30 MG/1
30 CAPSULE ORAL NIGHTLY PRN
Qty: 90 CAPSULE | Refills: 0 | Status: SHIPPED | OUTPATIENT
Start: 2018-10-02 | End: 2019-07-23

## 2018-10-02 NOTE — TELEPHONE ENCOUNTER
Patient Imelda Conroy wants to know if we can call her refill to [facility] in [facility] for her Temazepam 30mg and her son Lloyd Shane can pick it up  will send it to her to \[facility]\"".  Lloyd Shane tel# [phone]

## 2018-10-03 NOTE — TELEPHONE ENCOUNTER
LMTCB office on son's Jovani Walsh) voicemail. Please inform son RX script ready for  at  and to bring photo ID for verification.

## 2018-10-05 DIAGNOSIS — G47.00 INSOMNIA, UNSPECIFIED TYPE: ICD-10-CM

## 2018-10-10 RX ORDER — TEMAZEPAM 30 MG/1
CAPSULE ORAL
Qty: 30 CAPSULE | Refills: 0 | OUTPATIENT
Start: 2018-10-10

## 2018-10-10 NOTE — TELEPHONE ENCOUNTER
Patient called asking for status of refill - notified rx is at  for  and a message was left for son, Winston Malave on 10/3

## 2018-11-06 ENCOUNTER — OFFICE VISIT (OUTPATIENT)
Dept: INTERNAL MEDICINE CLINIC | Facility: CLINIC | Age: 73
End: 2018-11-06
Payer: MEDICARE

## 2018-11-06 VITALS
OXYGEN SATURATION: 98 % | RESPIRATION RATE: 18 BRPM | TEMPERATURE: 99 F | DIASTOLIC BLOOD PRESSURE: 62 MMHG | SYSTOLIC BLOOD PRESSURE: 124 MMHG | HEART RATE: 60 BPM

## 2018-11-06 DIAGNOSIS — R10.84 GENERALIZED ABDOMINAL PAIN: Primary | ICD-10-CM

## 2018-11-06 DIAGNOSIS — R19.7 DIARRHEA, UNSPECIFIED TYPE: ICD-10-CM

## 2018-11-06 DIAGNOSIS — R14.0 BLOATING: ICD-10-CM

## 2018-11-06 PROCEDURE — 99214 OFFICE O/P EST MOD 30 MIN: CPT | Performed by: NURSE PRACTITIONER

## 2018-11-06 NOTE — PATIENT INSTRUCTIONS
Diarrhea with Uncertain Cause (Adult)    Diarrhea is when stools are loose and watery.  This can be caused by:  · Viral infections  · Bacterial infections  · Food poisoning  · Parasites  · Irritable bowel syndrome (IBS)  · Inflammatory bowel diseases such · You may use acetaminophen or NSAID medicines like ibuprofen or naproxen to reduce pain and fever. Don’t use these if you have chronic liver or kidney disease, or ever had a stomach ulcer or gastrointestinal bleeding.  Don't use NSAID medicines if you are · Don’t force yourself to eat, especially if you are having cramping, vomiting, or diarrhea. Don’t eat large amounts at a time, even if you are hungry. · If you eat, avoid fatty, greasy, spicy, or fried foods.   · Don’t eat dairy foods or drink milk if you · Abdominal pain that gets worse  · Constant lower right abdominal pain  · Continued vomiting and inability to keep liquids down  · Diarrhea more than 5 times a day  · Blood in vomit or stool  · Dark urine or no urine for 8 hours, dry mouth and tongue, tir

## 2018-11-07 ENCOUNTER — LAB ENCOUNTER (OUTPATIENT)
Dept: LAB | Age: 73
End: 2018-11-07
Attending: NURSE PRACTITIONER
Payer: MEDICARE

## 2018-11-07 DIAGNOSIS — R19.7 DIARRHEA, UNSPECIFIED TYPE: ICD-10-CM

## 2018-11-07 DIAGNOSIS — R14.0 BLOATING: ICD-10-CM

## 2018-11-07 PROCEDURE — 87493 C DIFF AMPLIFIED PROBE: CPT

## 2018-11-07 PROCEDURE — 89055 LEUKOCYTE ASSESSMENT FECAL: CPT

## 2018-11-07 PROCEDURE — 87046 STOOL CULTR AEROBIC BACT EA: CPT

## 2018-11-07 PROCEDURE — 87427 SHIGA-LIKE TOXIN AG IA: CPT

## 2018-11-07 PROCEDURE — 87338 HPYLORI STOOL AG IA: CPT

## 2018-11-07 PROCEDURE — 87045 FECES CULTURE AEROBIC BACT: CPT

## 2018-11-10 DIAGNOSIS — L50.9 URTICARIA: ICD-10-CM

## 2018-11-12 DIAGNOSIS — L50.9 URTICARIA: ICD-10-CM

## 2018-11-12 RX ORDER — HYDROXYZINE PAMOATE 25 MG/1
CAPSULE ORAL
Qty: 90 CAPSULE | Refills: 0 | OUTPATIENT
Start: 2018-11-12

## 2018-11-13 RX ORDER — HYDROXYZINE PAMOATE 25 MG/1
CAPSULE ORAL
Qty: 90 CAPSULE | Refills: 0 | Status: SHIPPED | OUTPATIENT
Start: 2018-11-13 | End: 2019-01-21

## 2018-11-16 NOTE — TELEPHONE ENCOUNTER
This medication was authorized 11/13/18 by HOANG Akhtar. Called patient and states she picked up medication from pharmacy. Patient states has been on this medication since the 1980s.

## 2018-11-21 ENCOUNTER — HOSPITAL ENCOUNTER (OUTPATIENT)
Dept: ULTRASOUND IMAGING | Age: 73
Discharge: HOME | End: 2018-11-21
Attending: NURSE PRACTITIONER
Payer: MEDICARE

## 2018-11-21 DIAGNOSIS — R10.84 GENERALIZED ABDOMINAL PAIN: ICD-10-CM

## 2018-11-21 PROCEDURE — 76700 US EXAM ABDOM COMPLETE: CPT | Performed by: NURSE PRACTITIONER

## 2018-12-05 DIAGNOSIS — I10 ESSENTIAL HYPERTENSION: ICD-10-CM

## 2018-12-05 RX ORDER — METOPROLOL TARTRATE 50 MG/1
50 TABLET, FILM COATED ORAL DAILY
Qty: 90 TABLET | Refills: 0 | Status: SHIPPED | OUTPATIENT
Start: 2018-12-05 | End: 2019-01-21

## 2018-12-05 NOTE — TELEPHONE ENCOUNTER
Refill requested:   Requested Prescriptions     Pending Prescriptions Disp Refills   • Metoprolol Tartrate 50 MG Oral Tab [Pharmacy Med Name: Metoprolol Tartrate Oral Tablet 50 MG] 90 tablet 0     Sig: Take 1 tablet (50 mg total) by mouth daily.        Fail

## 2018-12-10 RX ORDER — RAMELTEON 8 MG/1
8 TABLET ORAL NIGHTLY
Qty: 30 TABLET | Refills: 2 | Status: SHIPPED | OUTPATIENT
Start: 2018-12-10 | End: 2019-02-18

## 2018-12-23 ENCOUNTER — HOSPITAL ENCOUNTER (OUTPATIENT)
Age: 73
Discharge: HOME | End: 2018-12-23
Attending: FAMILY MEDICINE
Payer: MEDICARE

## 2018-12-23 ENCOUNTER — APPOINTMENT (OUTPATIENT)
Dept: GENERAL RADIOLOGY | Age: 73
End: 2018-12-23
Attending: FAMILY MEDICINE
Payer: MEDICARE

## 2018-12-23 VITALS
OXYGEN SATURATION: 98 % | SYSTOLIC BLOOD PRESSURE: 138 MMHG | HEART RATE: 63 BPM | RESPIRATION RATE: 20 BRPM | DIASTOLIC BLOOD PRESSURE: 88 MMHG | TEMPERATURE: 98 F

## 2018-12-23 DIAGNOSIS — M25.562 ACUTE PAIN OF LEFT KNEE: Primary | ICD-10-CM

## 2018-12-23 PROCEDURE — 99213 OFFICE O/P EST LOW 20 MIN: CPT

## 2018-12-23 PROCEDURE — 73562 X-RAY EXAM OF KNEE 3: CPT | Performed by: FAMILY MEDICINE

## 2018-12-23 NOTE — ED PROVIDER NOTES
Patient Seen in: [facility] Care In [facility]    History   Patient presents with:  Musculoskeletal Problem    Stated Complaint: LEFT KNEE PAIN    22-year-old female comes in with concerns of left knee pain that started yesterday while she was get reviewed and is not pertinent to presenting problem.     Social History    Tobacco Use      Smoking status: Never Smoker      Smokeless tobacco: Never Used    Alcohol use: Yes      Comment: occ    Drug use: No      Review of Systems   Constitutional: Bashir William Tenderness found. Medial joint line tenderness noted. Right ankle: She exhibits normal range of motion, no swelling, no ecchymosis, no deformity and normal pulse. No tenderness.         Left ankle: She exhibits normal range of motion, no swelling, no pm    Follow-up:  Obdulia Jacob MD  [address] [place] [address] [phone]    In 1 week  As needed        Medications Prescribed:  Current Discharge Medication List

## 2018-12-23 NOTE — ED INITIAL ASSESSMENT (HPI)
Pain yesterday to left knee upon getting out of car - had just finished walking on treadmill at gym for 50 minutes. States feels like there is something loose inside. Entire knee uncomfortable.

## 2019-01-19 PROBLEM — G43.009 MIGRAINE WITHOUT AURA OR STATUS MIGRAINOSUS: Status: ACTIVE | Noted: 2019-01-19

## 2019-01-19 PROBLEM — F33.9 DEPRESSION, MAJOR, RECURRENT (HCC): Status: ACTIVE | Noted: 2019-01-19

## 2019-01-19 PROBLEM — F33.9 DEPRESSION, MAJOR, RECURRENT: Status: ACTIVE | Noted: 2019-01-19

## 2019-01-19 PROBLEM — I70.0 ATHEROSCLEROSIS OF ABDOMINAL AORTA: Status: ACTIVE | Noted: 2019-01-19

## 2019-01-19 PROBLEM — I70.0 ATHEROSCLEROSIS OF ABDOMINAL AORTA (HCC): Status: ACTIVE | Noted: 2019-01-19

## 2019-01-21 ENCOUNTER — HOSPITAL ENCOUNTER (OUTPATIENT)
Age: 74
Discharge: HOME | End: 2019-01-21
Payer: MEDICARE

## 2019-01-21 ENCOUNTER — OFFICE VISIT (OUTPATIENT)
Dept: INTERNAL MEDICINE CLINIC | Facility: CLINIC | Age: 74
End: 2019-01-21
Payer: MEDICARE

## 2019-01-21 VITALS
HEART RATE: 72 BPM | RESPIRATION RATE: 16 BRPM | BODY MASS INDEX: 28 KG/M2 | DIASTOLIC BLOOD PRESSURE: 68 MMHG | SYSTOLIC BLOOD PRESSURE: 122 MMHG | WEIGHT: 154 LBS | TEMPERATURE: 98 F | OXYGEN SATURATION: 98 %

## 2019-01-21 VITALS
DIASTOLIC BLOOD PRESSURE: 80 MMHG | HEART RATE: 62 BPM | TEMPERATURE: 98 F | WEIGHT: 140 LBS | BODY MASS INDEX: 25 KG/M2 | OXYGEN SATURATION: 98 % | SYSTOLIC BLOOD PRESSURE: 149 MMHG | RESPIRATION RATE: 16 BRPM

## 2019-01-21 DIAGNOSIS — Z00.00 ENCOUNTER FOR ANNUAL HEALTH EXAMINATION: ICD-10-CM

## 2019-01-21 DIAGNOSIS — E78.5 HYPERLIPIDEMIA, UNSPECIFIED HYPERLIPIDEMIA TYPE: ICD-10-CM

## 2019-01-21 DIAGNOSIS — F41.1 GENERALIZED ANXIETY DISORDER: ICD-10-CM

## 2019-01-21 DIAGNOSIS — L50.9 URTICARIA: ICD-10-CM

## 2019-01-21 DIAGNOSIS — IMO0002 OSTEOARTHROSIS INVOLVING LOWER LEG: ICD-10-CM

## 2019-01-21 DIAGNOSIS — G47.00 INSOMNIA, UNSPECIFIED TYPE: ICD-10-CM

## 2019-01-21 DIAGNOSIS — R73.01 IMPAIRED FASTING GLUCOSE: ICD-10-CM

## 2019-01-21 DIAGNOSIS — M81.6 LOCALIZED OSTEOPOROSIS WITHOUT CURRENT PATHOLOGICAL FRACTURE: ICD-10-CM

## 2019-01-21 DIAGNOSIS — K21.9 GASTROESOPHAGEAL REFLUX DISEASE, ESOPHAGITIS PRESENCE NOT SPECIFIED: ICD-10-CM

## 2019-01-21 DIAGNOSIS — G43.009 MIGRAINE WITHOUT AURA AND WITHOUT STATUS MIGRAINOSUS, NOT INTRACTABLE: ICD-10-CM

## 2019-01-21 DIAGNOSIS — L03.019 ONYCHIA AND PARONYCHIA OF FINGER: Primary | ICD-10-CM

## 2019-01-21 DIAGNOSIS — K57.92 DIVERTICULITIS OF INTESTINE WITHOUT PERFORATION OR ABSCESS WITHOUT BLEEDING, UNSPECIFIED PART OF INTESTINAL TRACT: ICD-10-CM

## 2019-01-21 DIAGNOSIS — I10 ESSENTIAL HYPERTENSION: Primary | ICD-10-CM

## 2019-01-21 DIAGNOSIS — J30.9 ALLERGIC RHINITIS, UNSPECIFIED SEASONALITY, UNSPECIFIED TRIGGER: ICD-10-CM

## 2019-01-21 DIAGNOSIS — I70.0 ATHEROSCLEROSIS OF ABDOMINAL AORTA (HCC): ICD-10-CM

## 2019-01-21 DIAGNOSIS — K44.9 DIAPHRAGMATIC HERNIA WITHOUT OBSTRUCTION AND WITHOUT GANGRENE: ICD-10-CM

## 2019-01-21 DIAGNOSIS — F33.9 EPISODE OF RECURRENT MAJOR DEPRESSIVE DISORDER, UNSPECIFIED DEPRESSION EPISODE SEVERITY (HCC): ICD-10-CM

## 2019-01-21 DIAGNOSIS — L03.012 CELLULITIS OF FINGER OF LEFT HAND: ICD-10-CM

## 2019-01-21 PROCEDURE — G0439 PPPS, SUBSEQ VISIT: HCPCS | Performed by: FAMILY MEDICINE

## 2019-01-21 PROCEDURE — 99214 OFFICE O/P EST MOD 30 MIN: CPT | Performed by: FAMILY MEDICINE

## 2019-01-21 PROCEDURE — 99212 OFFICE O/P EST SF 10 MIN: CPT

## 2019-01-21 PROCEDURE — 10060 I&D ABSCESS SIMPLE/SINGLE: CPT

## 2019-01-21 PROCEDURE — 99213 OFFICE O/P EST LOW 20 MIN: CPT

## 2019-01-21 RX ORDER — CLINDAMYCIN HYDROCHLORIDE 150 MG/1
150 CAPSULE ORAL 3 TIMES DAILY
Qty: 30 CAPSULE | Refills: 0 | Status: SHIPPED | OUTPATIENT
Start: 2019-01-21 | End: 2019-02-18 | Stop reason: ALTCHOICE

## 2019-01-21 NOTE — PROGRESS NOTES
HPI:   Angelika Oakes is a 68year old female who presents for a Medicare Subsequent Annual Wellness visit (Pt already had Initial Annual Wellness). Patient presents for recheck of their hypertension/hyperlidemia.  Pt has been taking medications Date Value   05/24/2014 160     Cholesterol, Total (mg/dL)   Date Value   07/25/2018 200 (H)   11/18/2015 218 (H)     HDL Cholesterol (mg/dL)   Date Value   07/25/2018 56   11/18/2015 61     HDL CHOLESTEROL (mg/dL)   Date Value   10/06/2017 61   07/16/20 does have a POA but we do NOT have it on file in [address] [place] [address] Rd. The patient has this document but we do not have it in [facility], and patient is instructed to get our office a copy of it for scanning into Epic.            She has never smoked tobacco.    CAGE Alcohol s encounter (Office Visit) with HERB Ward:  Clindamycin HCl 150 MG Oral Cap Take 1 capsule (150 mg total) by mouth 3 (three) times daily. temazepam (RESTORIL) 30 MG Oral Cap Take 1 capsule (30 mg total) by mouth nightly as needed for Sleep.    Hyd Her family history includes Breast Cancer in her maternal grandmother; Cancer in her maternal grandmother; Heart Disease in her father; Stroke in her mother; hypothyroidism in her mother. SOCIAL HISTORY:   She  reports that  has never smoked.  she has not enlarged, symmetric, no tenderness/mass/nodules; no carotid bruit or JVD   Back:   Symmetric, no curvature, ROM normal, no CVA tenderness   Lungs:   Clear to auscultation bilaterally, respirations unlabored   Heart:  Regular rate and rhythm, S1 and S2 disorder, unspecified depression episode severity ([facility] [address]    Diaphragmatic hernia without obstruction and without gangrene    Generalized anxiety disorder    Localized osteoporosis without current pathological fracture    Diverticulitis of intestine without Generalized anxiety disorder    sees Dr Teresa Roque her psychiatrist    12. Localized osteoporosis without current pathological fracture  - stable sees her Gyne Dr. Dianne Lacey    13.  Diverticulitis of intestine without perforation or abscess without bleeding, un Value   10/06/2017 137 (H)     LDL Cholesterol (mg/dL)   Date Value   07/25/2018 114 (H)        EKG - w/ Initial Preventative Physical Exam only, or if medically necessary Electrocardiogram date       Colorectal Cancer Screening      Colonoscopy Screen sterling Illicit injectable drug abusers     Tetanus Toxoid  Only covered with a cut with metal- TD and TDaP Not covered by Medicare Part B No vaccine history found This may be covered with your prescription benefits, but Medicare does not cover unless Medically

## 2019-01-22 NOTE — PATIENT INSTRUCTIONS
Yue Alonzo's SCREENING SCHEDULE   Tests on this list are recommended by your physician but may not be covered, or covered at this frequency, by your insurer. Please check with your insurance carrier before scheduling to verify coverage.    PRE to patients who meet one of the following criteria:   • Men who are 73-68 years old and have smoked more than 100 cigarettes in their lifetime   • Anyone with a family history    Colorectal Cancer Screening  Covered up to Age 76     Colonoscopy Screen   Co Immunizations      Influenza  Covered Annually No orders found for this or any previous visit.  Please get every year    Pneumococcal 13 (Prevnar)  Covered Once after 65 Orders placed or performed in visit on 11/14/17   • PNEUMOCOCCAL VACC, 13 RONDA IM    P Directives.

## 2019-01-22 NOTE — ED INITIAL ASSESSMENT (HPI)
Patient reports she was given an oral antibiotic today and took one dose about 20 minutes ago. The patient is concerned because she now has a whitish area on the finger and she feels it may be a pus pocket.   Patient is also complaining of pain to the fing

## 2019-01-22 NOTE — ED PROVIDER NOTES
Patient Seen in: [facility] END    History   Patient presents with:  Finger Pain    Stated Complaint: LEFT INDEX FINGER NAIL INFECTION    HPI  69 yo right handed female presents with left 2nd digit pain, redness, and swelling since Positive for stated complaint: LEFT INDEX FINGER NAIL INFECTION  Other systems are as noted in HPI. Constitutional and vital signs reviewed. All other systems reviewed and negative except as noted above.     Physical Exam     ED Triage Vitals [01/21/1 Patient tolerated the procedure well. Return to clinic in 1-2 days for recheck of paronychia    Warm compresses to site encouraged  Wound care instructions given. All results reviewed and discussed with patient.   See AVS for detailed discharge instructi

## 2019-01-28 ENCOUNTER — OFFICE VISIT (OUTPATIENT)
Dept: INTERNAL MEDICINE CLINIC | Facility: CLINIC | Age: 74
End: 2019-01-28
Payer: MEDICARE

## 2019-01-28 VITALS
HEART RATE: 72 BPM | BODY MASS INDEX: 28 KG/M2 | OXYGEN SATURATION: 98 % | WEIGHT: 154 LBS | RESPIRATION RATE: 16 BRPM | TEMPERATURE: 98 F | DIASTOLIC BLOOD PRESSURE: 68 MMHG | SYSTOLIC BLOOD PRESSURE: 128 MMHG

## 2019-01-28 DIAGNOSIS — L03.019 ONYCHIA AND PARONYCHIA OF FINGER: Primary | ICD-10-CM

## 2019-01-28 PROCEDURE — 99212 OFFICE O/P EST SF 10 MIN: CPT | Performed by: FAMILY MEDICINE

## 2019-01-28 NOTE — PROGRESS NOTES
CHIEF COMPLAINT:     Patient presents with: Infection: had an infection on left index finger. no pain to it now, no pus      HPI:   Tracie Hunt is a 68year old female .   The patient presents for a recheck after Urgent Care visit for diagnosis Succinate (IMITREX) 50 MG Oral Tab Take one tablet by mouth at onset of migraine. *May repeat in 2 hours. * Disp: 10 tablet Rfl: 4   Multiple Vitamin Oral Tab Take 1 tablet by mouth daily.  Disp:  Rfl:    Ergocalciferol (VITAMIN D OR) Take 3,000 Units by mo Breathing is non labored. CARDIO: RRR without murmur  LYMPH:  no lymphadenopathy.       Physical Exam    ASSESSMENT AND PLAN:     Onychia and paronychia of finger  (primary encounter diagnosis)    No orders of the defined types were placed in this encounte

## 2019-02-18 ENCOUNTER — HOSPITAL ENCOUNTER (OUTPATIENT)
Age: 74
Discharge: HOME | End: 2019-02-18
Payer: MEDICARE

## 2019-02-18 ENCOUNTER — APPOINTMENT (OUTPATIENT)
Dept: GENERAL RADIOLOGY | Age: 74
End: 2019-02-18
Attending: NURSE PRACTITIONER
Payer: MEDICARE

## 2019-02-18 VITALS
HEART RATE: 57 BPM | OXYGEN SATURATION: 99 % | HEIGHT: 62 IN | TEMPERATURE: 98 F | RESPIRATION RATE: 16 BRPM | DIASTOLIC BLOOD PRESSURE: 78 MMHG | BODY MASS INDEX: 25.76 KG/M2 | SYSTOLIC BLOOD PRESSURE: 121 MMHG | WEIGHT: 140 LBS

## 2019-02-18 DIAGNOSIS — H00.011 HORDEOLUM EXTERNUM OF RIGHT UPPER EYELID: ICD-10-CM

## 2019-02-18 DIAGNOSIS — H10.33 ACUTE CONJUNCTIVITIS OF BOTH EYES, UNSPECIFIED ACUTE CONJUNCTIVITIS TYPE: ICD-10-CM

## 2019-02-18 DIAGNOSIS — J01.00 ACUTE NON-RECURRENT MAXILLARY SINUSITIS: Primary | ICD-10-CM

## 2019-02-18 PROCEDURE — 71046 X-RAY EXAM CHEST 2 VIEWS: CPT | Performed by: NURSE PRACTITIONER

## 2019-02-18 PROCEDURE — 99213 OFFICE O/P EST LOW 20 MIN: CPT

## 2019-02-18 PROCEDURE — 99214 OFFICE O/P EST MOD 30 MIN: CPT

## 2019-02-18 RX ORDER — AZITHROMYCIN 250 MG/1
TABLET, FILM COATED ORAL
Qty: 1 PACKAGE | Refills: 0 | Status: SHIPPED | OUTPATIENT
Start: 2019-02-18 | End: 2019-02-23

## 2019-02-18 RX ORDER — METHYLPREDNISOLONE 4 MG/1
TABLET ORAL
Qty: 1 PACKAGE | Refills: 0 | Status: SHIPPED | OUTPATIENT
Start: 2019-02-18 | End: 2019-03-07 | Stop reason: ALTCHOICE

## 2019-02-18 RX ORDER — POLYMYXIN B SULFATE AND TRIMETHOPRIM 1; 10000 MG/ML; [USP'U]/ML
1 SOLUTION OPHTHALMIC
Qty: 10 ML | Refills: 0 | Status: SHIPPED | OUTPATIENT
Start: 2019-02-18 | End: 2019-02-23

## 2019-02-18 NOTE — ED INITIAL ASSESSMENT (HPI)
C/o left eye redness with yellowish discharge and right upper eyelid swollen possible Stye since yesterday. Cold symptoms for a week, cough, coughing-up yellowish mucous and right nostril with yellowish blood streak discharges.

## 2019-02-19 NOTE — ED PROVIDER NOTES
Patient Seen in: [facility]    History   Patient presents with:  Eye Problem  Cough/URI    Stated Complaint: COLD / Chalino Fallen / STYE    27-year-old female presents today with complaints of drainage and matting and itching to the 5/23/2014    Performed by Campbell Briceno MD at [facility] MAIN OR   • OTHER SURGICAL HISTORY  3/4/13    EDW - Dr. Ron Acosta lap repair diag hernia, lap nissen fundoplication   • OTHER SURGICAL HISTORY  may 23 2014    Hiatel hernia repair by Dr. Yumi Null (open) Is also has some congestion runny nose sinus pressure and now cough. Chest x-ray showed no consolidation no pneumonia. Will give prescription for Polytrim eyedrops for the acute conjunctivitis. Patient encouraged to use a warm cloth to massage stye.   Al

## 2019-03-07 ENCOUNTER — APPOINTMENT (OUTPATIENT)
Dept: GENERAL RADIOLOGY | Age: 74
End: 2019-03-07
Attending: EMERGENCY MEDICINE
Payer: MEDICARE

## 2019-03-07 ENCOUNTER — HOSPITAL ENCOUNTER (OUTPATIENT)
Age: 74
Discharge: HOME | End: 2019-03-07
Attending: EMERGENCY MEDICINE
Payer: MEDICARE

## 2019-03-07 VITALS
OXYGEN SATURATION: 99 % | SYSTOLIC BLOOD PRESSURE: 155 MMHG | HEART RATE: 50 BPM | DIASTOLIC BLOOD PRESSURE: 82 MMHG | RESPIRATION RATE: 20 BRPM | TEMPERATURE: 99 F

## 2019-03-07 DIAGNOSIS — M25.561 ACUTE PAIN OF RIGHT KNEE: Primary | ICD-10-CM

## 2019-03-07 PROCEDURE — 99213 OFFICE O/P EST LOW 20 MIN: CPT

## 2019-03-07 PROCEDURE — 73560 X-RAY EXAM OF KNEE 1 OR 2: CPT | Performed by: EMERGENCY MEDICINE

## 2019-03-07 RX ORDER — IBUPROFEN 200 MG
400 TABLET ORAL ONCE
Status: COMPLETED | OUTPATIENT
Start: 2019-03-07 | End: 2019-03-07

## 2019-03-08 NOTE — ED INITIAL ASSESSMENT (HPI)
Pt felt pain after crawling on her bed to make the bed this morning. States her entire right knee feels loose. Is unable to pin point any area more painful than any other. Ace wrap removed on arrival.  No edema or deformity noted.

## 2019-03-08 NOTE — ED PROVIDER NOTES
Patient Seen in: [facility]    History   Patient presents with:  Knee Pain    Stated Complaint: R knee pain/injury    HPI    This is a 40-year-old female with past medical history of hypertension who presents with right knee marj Use      Smoking status: Never Smoker      Smokeless tobacco: Never Used    Alcohol use: Yes      Comment: occ    Drug use: No      Review of Systems    Positive for stated complaint: R knee pain/injury  Other systems are as noted in HPI.   Constitutional a further workup and evaluation. May need MRI if pain does not improve. She discharged home in good condition.             Disposition and Plan     Clinical Impression:  Acute pain of right knee  (primary encounter diagnosis)    Disposition:  Discharge  3/7

## 2019-05-20 DIAGNOSIS — L50.9 URTICARIA: ICD-10-CM

## 2019-05-22 RX ORDER — HYDROXYZINE PAMOATE 25 MG/1
CAPSULE ORAL
Qty: 90 CAPSULE | Refills: 0 | Status: SHIPPED | OUTPATIENT
Start: 2019-05-22 | End: 2019-08-18

## 2019-05-22 NOTE — TELEPHONE ENCOUNTER
Hydroxyzine  Last OV relevant to medication: 1-21-19  Last refill date: 9-5-18  #/refills: 0  When pt was asked to return for OV: 6 months  Upcoming appt/reason: none  Recent labs: none

## 2019-06-17 ENCOUNTER — TELEPHONE (OUTPATIENT)
Dept: INTERNAL MEDICINE CLINIC | Facility: CLINIC | Age: 74
End: 2019-06-17

## 2019-06-17 NOTE — TELEPHONE ENCOUNTER
Discussed with Dr. Peggy Peñaloza ,Pt does not have a legitimate medical condition to get out of jury dury. So I can not write her a note.

## 2019-06-17 NOTE — TELEPHONE ENCOUNTER
Patient stated that she received a letter in the mail for jury duty.  She stated that the letter stated that she needs to meet at the court house in [place] at [address] [place] [address]. Patient stated that she has a sleep condition where she cant wake up until 11:00am. She wa

## 2019-06-17 NOTE — TELEPHONE ENCOUNTER
Pt requesting a letter to exempt her from jury duty which starts July 1st.  Pt has a family reunion on end of June and will be returning July 1st.  Pt requesting Anne Holstein write a letter with a medical diagnosis to exempt her.   Pt also stated she does take Tem

## 2019-06-23 DIAGNOSIS — I10 ESSENTIAL HYPERTENSION: ICD-10-CM

## 2019-06-24 NOTE — TELEPHONE ENCOUNTER
Lm for pt to come into the lab and get lab work done. Left in voice mail that pt will need to fast 8-12hr, but may drink water. Pt is due for Bp check in July.      Metoprolol failed protocol due to  Hypertension Medications Protocol Failed6/23 2:28 PM   CM

## 2019-06-25 ENCOUNTER — APPOINTMENT (OUTPATIENT)
Dept: LAB | Age: 74
End: 2019-06-25
Attending: FAMILY MEDICINE
Payer: MEDICARE

## 2019-06-25 DIAGNOSIS — E78.5 HYPERLIPIDEMIA, UNSPECIFIED HYPERLIPIDEMIA TYPE: ICD-10-CM

## 2019-06-25 DIAGNOSIS — R73.01 IMPAIRED FASTING GLUCOSE: ICD-10-CM

## 2019-06-25 PROCEDURE — 80053 COMPREHEN METABOLIC PANEL: CPT

## 2019-06-25 PROCEDURE — 36415 COLL VENOUS BLD VENIPUNCTURE: CPT

## 2019-06-25 PROCEDURE — 80061 LIPID PANEL: CPT

## 2019-06-25 PROCEDURE — 83036 HEMOGLOBIN GLYCOSYLATED A1C: CPT

## 2019-06-26 RX ORDER — METOPROLOL TARTRATE 50 MG/1
TABLET, FILM COATED ORAL
Qty: 90 TABLET | Refills: 0 | Status: SHIPPED | OUTPATIENT
Start: 2019-06-26 | End: 2019-09-09

## 2019-06-27 ENCOUNTER — TELEPHONE (OUTPATIENT)
Dept: INTERNAL MEDICINE CLINIC | Facility: CLINIC | Age: 74
End: 2019-06-27

## 2019-06-27 DIAGNOSIS — R79.89 ABNORMAL BLOOD CREATININE LEVEL: ICD-10-CM

## 2019-06-27 DIAGNOSIS — R73.09 ELEVATED HEMOGLOBIN A1C: ICD-10-CM

## 2019-06-27 DIAGNOSIS — E78.9 ABNORMAL CHOLESTEROL TEST: Primary | ICD-10-CM

## 2019-06-27 NOTE — TELEPHONE ENCOUNTER
Ordered labs. 1st lab orders had no expect lab dates, canceled them out and ordered new one with 6 month expected dates.

## 2019-07-23 ENCOUNTER — TELEPHONE (OUTPATIENT)
Dept: INTERNAL MEDICINE CLINIC | Facility: CLINIC | Age: 74
End: 2019-07-23

## 2019-07-23 ENCOUNTER — LAB ENCOUNTER (OUTPATIENT)
Dept: LAB | Age: 74
End: 2019-07-23
Attending: FAMILY MEDICINE
Payer: MEDICARE

## 2019-07-23 ENCOUNTER — OFFICE VISIT (OUTPATIENT)
Dept: INTERNAL MEDICINE CLINIC | Facility: CLINIC | Age: 74
End: 2019-07-23
Payer: MEDICARE

## 2019-07-23 VITALS
HEART RATE: 78 BPM | WEIGHT: 160.75 LBS | OXYGEN SATURATION: 97 % | RESPIRATION RATE: 16 BRPM | DIASTOLIC BLOOD PRESSURE: 72 MMHG | SYSTOLIC BLOOD PRESSURE: 130 MMHG | TEMPERATURE: 99 F | BODY MASS INDEX: 29 KG/M2

## 2019-07-23 DIAGNOSIS — R19.7 DIARRHEA, UNSPECIFIED TYPE: ICD-10-CM

## 2019-07-23 DIAGNOSIS — R19.7 DIARRHEA, UNSPECIFIED TYPE: Primary | ICD-10-CM

## 2019-07-23 DIAGNOSIS — R53.83 FATIGUE, UNSPECIFIED TYPE: ICD-10-CM

## 2019-07-23 DIAGNOSIS — G47.00 INSOMNIA, UNSPECIFIED TYPE: ICD-10-CM

## 2019-07-23 LAB
ALBUMIN SERPL-MCNC: 3.8 G/DL (ref 3.4–5)
ALBUMIN/GLOB SERPL: 1.2 {RATIO} (ref 1–2)
ALP LIVER SERPL-CCNC: 87 U/L (ref 55–142)
ALT SERPL-CCNC: 30 U/L (ref 13–56)
ANION GAP SERPL CALC-SCNC: 6 MMOL/L (ref 0–18)
AST SERPL-CCNC: 25 U/L (ref 15–37)
BASOPHILS # BLD AUTO: 0.03 X10(3) UL (ref 0–0.2)
BASOPHILS NFR BLD AUTO: 0.5 %
BILIRUB SERPL-MCNC: 0.5 MG/DL (ref 0.1–2)
BUN BLD-MCNC: 19 MG/DL (ref 7–18)
BUN/CREAT SERPL: 17.1 (ref 10–20)
CALCIUM BLD-MCNC: 9 MG/DL (ref 8.5–10.1)
CHLORIDE SERPL-SCNC: 111 MMOL/L (ref 98–112)
CO2 SERPL-SCNC: 25 MMOL/L (ref 21–32)
CREAT BLD-MCNC: 1.11 MG/DL (ref 0.55–1.02)
DEPRECATED RDW RBC AUTO: 45.2 FL (ref 35.1–46.3)
EOSINOPHIL # BLD AUTO: 0.42 X10(3) UL (ref 0–0.7)
EOSINOPHIL NFR BLD AUTO: 7.4 %
ERYTHROCYTE [DISTWIDTH] IN BLOOD BY AUTOMATED COUNT: 12.9 % (ref 11–15)
GLOBULIN PLAS-MCNC: 3.2 G/DL (ref 2.8–4.4)
GLUCOSE BLD-MCNC: 108 MG/DL (ref 70–99)
HCT VFR BLD AUTO: 42.3 % (ref 35–48)
HGB BLD-MCNC: 14.2 G/DL (ref 12–16)
IMM GRANULOCYTES # BLD AUTO: 0.02 X10(3) UL (ref 0–1)
IMM GRANULOCYTES NFR BLD: 0.4 %
LYMPHOCYTES # BLD AUTO: 1.66 X10(3) UL (ref 1–4)
LYMPHOCYTES NFR BLD AUTO: 29.3 %
M PROTEIN MFR SERPL ELPH: 7 G/DL (ref 6.4–8.2)
MCH RBC QN AUTO: 31.7 PG (ref 26–34)
MCHC RBC AUTO-ENTMCNC: 33.6 G/DL (ref 31–37)
MCV RBC AUTO: 94.4 FL (ref 80–100)
MONOCYTES # BLD AUTO: 0.59 X10(3) UL (ref 0.1–1)
MONOCYTES NFR BLD AUTO: 10.4 %
NEUTROPHILS # BLD AUTO: 2.95 X10 (3) UL (ref 1.5–7.7)
NEUTROPHILS # BLD AUTO: 2.95 X10(3) UL (ref 1.5–7.7)
NEUTROPHILS NFR BLD AUTO: 52 %
OSMOLALITY SERPL CALC.SUM OF ELEC: 297 MOSM/KG (ref 275–295)
PLATELET # BLD AUTO: 258 10(3)UL (ref 150–450)
POTASSIUM SERPL-SCNC: 4.4 MMOL/L (ref 3.5–5.1)
RBC # BLD AUTO: 4.48 X10(6)UL (ref 3.8–5.3)
SODIUM SERPL-SCNC: 142 MMOL/L (ref 136–145)
WBC # BLD AUTO: 5.7 X10(3) UL (ref 4–11)

## 2019-07-23 PROCEDURE — 99214 OFFICE O/P EST MOD 30 MIN: CPT | Performed by: FAMILY MEDICINE

## 2019-07-23 PROCEDURE — 80053 COMPREHEN METABOLIC PANEL: CPT

## 2019-07-23 PROCEDURE — 36415 COLL VENOUS BLD VENIPUNCTURE: CPT

## 2019-07-23 PROCEDURE — 85025 COMPLETE CBC W/AUTO DIFF WBC: CPT

## 2019-07-23 RX ORDER — FOLIC ACID 1 MG/1
1 TABLET ORAL DAILY
COMMUNITY
End: 2021-06-24 | Stop reason: ALTCHOICE

## 2019-07-23 RX ORDER — ASPIRIN 81 MG/1
1 TABLET, CHEWABLE ORAL DAILY
COMMUNITY
End: 2019-07-23

## 2019-07-23 RX ORDER — TEMAZEPAM 30 MG/1
30 CAPSULE ORAL NIGHTLY PRN
Qty: 90 CAPSULE | Refills: 0 | Status: SHIPPED | OUTPATIENT
Start: 2019-07-23 | End: 2020-05-10

## 2019-07-23 RX ORDER — ESTRADIOL 0.1 MG/G
CREAM VAGINAL
COMMUNITY
Start: 2019-02-22

## 2019-07-23 RX ORDER — BUPROPION HYDROCHLORIDE 150 MG/1
1 TABLET, EXTENDED RELEASE ORAL DAILY
COMMUNITY
End: 2019-11-12

## 2019-07-23 NOTE — PROGRESS NOTES
CHIEF COMPLAINT:     Patient presents with:  Diarrhea: Pt has had diarrhea, has changed her diet. OTC Imodium and pepto. Pt has had stool test for parasites, came out negative. Pt has done research for celiac disease and has symptoms. Pt has seen GI.  Pt st Oral Tab Take 1 tablet by mouth daily. Disp:  Rfl:    temazepam (RESTORIL) 30 MG Oral Cap Take 1 capsule (30 mg total) by mouth nightly as needed for Sleep.  Disp: 90 capsule Rfl: 0   METOPROLOL TARTRATE 50 MG Oral Tab TAKE ONE TABLET BY MOUTH DAILY Disp: 9 throat or ear pain  CHEST: Denies chest pain, or palpitations  LUNGS: Denies shortness of breath, cough, or wheezing  GI: see HPI   MUSCULOSKELETAL: no arthralgia or swollen joints  LYMPH:  Denies lymphadenopathy  NEURO: Denies headaches or lightheadedness Dispense: 90 capsule; Refill: 0    Stop taking magnesium supplement  Eat whole fruits instead of smoothies  Avoid coffee  Continue to use her Metamucil  The patient indicates understanding of these issues and agrees to the plan.   The patient is asked to re

## 2019-07-25 ENCOUNTER — LAB ENCOUNTER (OUTPATIENT)
Dept: LAB | Age: 74
End: 2019-07-25
Attending: FAMILY MEDICINE
Payer: MEDICARE

## 2019-07-25 DIAGNOSIS — R19.7 DIARRHEA, UNSPECIFIED TYPE: ICD-10-CM

## 2019-07-26 PROCEDURE — 82438 ASSAY OTHER FLUID CHLORIDES: CPT

## 2019-07-26 PROCEDURE — 87209 SMEAR COMPLEX STAIN: CPT

## 2019-07-26 PROCEDURE — 89055 LEUKOCYTE ASSESSMENT FECAL: CPT

## 2019-07-26 PROCEDURE — 84302 ASSAY OF SWEAT SODIUM: CPT

## 2019-07-26 PROCEDURE — 82705 FATS/LIPIDS FECES QUAL: CPT

## 2019-07-26 PROCEDURE — 84999 UNLISTED CHEMISTRY PROCEDURE: CPT

## 2019-07-26 PROCEDURE — 87272 CRYPTOSPORIDIUM AG IF: CPT

## 2019-07-26 PROCEDURE — 83735 ASSAY OF MAGNESIUM: CPT

## 2019-07-26 PROCEDURE — 87427 SHIGA-LIKE TOXIN AG IA: CPT

## 2019-07-26 PROCEDURE — 87046 STOOL CULTR AEROBIC BACT EA: CPT

## 2019-07-26 PROCEDURE — 87045 FECES CULTURE AEROBIC BACT: CPT

## 2019-07-26 PROCEDURE — 87177 OVA AND PARASITES SMEARS: CPT

## 2019-07-26 PROCEDURE — 87329 GIARDIA AG IA: CPT

## 2019-07-27 LAB
CRYPTOSP AG STL QL IA: NEGATIVE
G LAMBLIA AG STL QL IA: NEGATIVE

## 2019-07-30 ENCOUNTER — TELEPHONE (OUTPATIENT)
Dept: INTERNAL MEDICINE CLINIC | Facility: CLINIC | Age: 74
End: 2019-07-30

## 2019-07-31 LAB
NEUTRAL FAT, FECAL: NORMAL
SPLIT FAT, FECAL: NORMAL

## 2019-08-01 LAB
OVA AND PARASITE, TRICHROME STAIN: NEGATIVE
OVA AND PARASITE, WET MOUNT: NEGATIVE

## 2019-08-01 NOTE — TELEPHONE ENCOUNTER
Pt called back to let SM know being off the magnesium made no difference still having loose stools. Wanting to know next step since all tests negative?

## 2019-08-06 ENCOUNTER — TELEPHONE (OUTPATIENT)
Dept: INTERNAL MEDICINE CLINIC | Facility: CLINIC | Age: 74
End: 2019-08-06

## 2019-08-06 NOTE — TELEPHONE ENCOUNTER
Patient calling in and wanted to speak with the Nurse regarding the Hep B vaccination she received last year. Pt just had some general questions. Please call pt to discuss.

## 2019-08-18 DIAGNOSIS — L50.9 URTICARIA: ICD-10-CM

## 2019-08-19 RX ORDER — HYDROXYZINE PAMOATE 25 MG/1
CAPSULE ORAL
Qty: 90 CAPSULE | Refills: 0 | Status: SHIPPED | OUTPATIENT
Start: 2019-08-19 | End: 2020-02-18

## 2019-08-19 NOTE — TELEPHONE ENCOUNTER
Hydoxyzine pamoate 25 MG  Last OV relevant to medication: 1-21-19  Last refill date: 5-22-19  #/refills: 0  When pt was asked to return for OV: 6 mo.   Upcoming appt/reason: none  Recent labs: none

## 2019-08-21 DIAGNOSIS — L50.9 URTICARIA: ICD-10-CM

## 2019-08-21 RX ORDER — HYDROXYZINE HYDROCHLORIDE 25 MG/1
25 TABLET, FILM COATED ORAL 3 TIMES DAILY PRN
Qty: 90 TABLET | Refills: 0 | Status: SHIPPED | OUTPATIENT
Start: 2019-08-21 | End: 2020-01-25

## 2019-08-21 RX ORDER — HYDROXYZINE PAMOATE 25 MG/1
CAPSULE ORAL
Qty: 90 CAPSULE | Refills: 0 | Status: SHIPPED | OUTPATIENT
Start: 2019-08-21 | End: 2020-02-18

## 2019-08-21 NOTE — TELEPHONE ENCOUNTER
Hydroxyzine Pamoate 25 Mg Oral Cap    Last OV relevant to medication: 1-    Last refill date: 5/22/2019     #/refills: #90 w/ 0 refills     When pt was asked to return for OV: 6 months     Upcoming appt/reason: No future appointments

## 2019-09-09 ENCOUNTER — TELEPHONE (OUTPATIENT)
Dept: INTERNAL MEDICINE CLINIC | Facility: CLINIC | Age: 74
End: 2019-09-09

## 2019-09-09 DIAGNOSIS — I10 ESSENTIAL HYPERTENSION: ICD-10-CM

## 2019-09-10 RX ORDER — METOPROLOL TARTRATE 50 MG/1
TABLET, FILM COATED ORAL
Qty: 90 TABLET | Refills: 0 | Status: SHIPPED | OUTPATIENT
Start: 2019-09-10 | End: 2019-12-05

## 2019-09-10 NOTE — TELEPHONE ENCOUNTER
Lm for pt to return phone call, pt is due for 6 mo. Bp f/u appt. Metoprolol 50 Mg  Last OV relevant to medication: 1-21-19  Last refill date: 6-26-19  #/refills: 0  When pt was asked to return for OV: 6 mo.   Upcoming appt/reason: none  Recent labs: 7-2

## 2019-09-10 NOTE — TELEPHONE ENCOUNTER
Patient called in, stated she will be leaving [facility] for 6 weeks starting this Thursday, and was hoping she would be able to receive her refill and will schedule an appointment to be seen when she returns from her vacation.     Future Appointment scheduled: 1

## 2019-11-11 NOTE — TELEPHONE ENCOUNTER
Early refill needed - pt spilled medication in sink    [place] #[id number]    buPROPion HCl ER, SR, 150 MG Oral Tablet 12 Hr    Future Appointments   Date Time Provider Teodoro Almaraz   11/18/2019  3:00 PM HERB Salazar [facility] [place]

## 2019-11-12 RX ORDER — BUPROPION HYDROCHLORIDE 150 MG/1
150 TABLET, EXTENDED RELEASE ORAL DAILY
Qty: 30 TABLET | Refills: 0 | Status: SHIPPED | OUTPATIENT
Start: 2019-11-12 | End: 2019-12-09

## 2019-11-12 NOTE — TELEPHONE ENCOUNTER
Medication last dispensed on 6/4/19 #90by Dr. Magaly Alvarado    [address] [place] [address] 7/23/19    See note below from front office staff

## 2019-11-18 ENCOUNTER — TELEPHONE (OUTPATIENT)
Dept: INTERNAL MEDICINE CLINIC | Facility: CLINIC | Age: 74
End: 2019-11-18

## 2019-11-18 ENCOUNTER — OFFICE VISIT (OUTPATIENT)
Dept: INTERNAL MEDICINE CLINIC | Facility: CLINIC | Age: 74
End: 2019-11-18
Payer: MEDICARE

## 2019-11-18 VITALS
DIASTOLIC BLOOD PRESSURE: 78 MMHG | RESPIRATION RATE: 16 BRPM | SYSTOLIC BLOOD PRESSURE: 126 MMHG | OXYGEN SATURATION: 97 % | HEART RATE: 72 BPM | HEIGHT: 62 IN | BODY MASS INDEX: 28.61 KG/M2 | TEMPERATURE: 99 F | WEIGHT: 155.5 LBS

## 2019-11-18 DIAGNOSIS — F41.1 GENERALIZED ANXIETY DISORDER: ICD-10-CM

## 2019-11-18 DIAGNOSIS — F33.0 MILD EPISODE OF RECURRENT MAJOR DEPRESSIVE DISORDER (HCC): ICD-10-CM

## 2019-11-18 DIAGNOSIS — I10 ESSENTIAL HYPERTENSION: Primary | ICD-10-CM

## 2019-11-18 DIAGNOSIS — G47.00 INSOMNIA, UNSPECIFIED TYPE: ICD-10-CM

## 2019-11-18 PROCEDURE — 99214 OFFICE O/P EST MOD 30 MIN: CPT | Performed by: FAMILY MEDICINE

## 2019-11-18 NOTE — TELEPHONE ENCOUNTER
Spoke to Baker Vincent Tr[employer] Tech. At [facility] in [facility], Jin michelle info. Over phone. Pt had vaccines. Flu high dose 65+, done on 09-6-2019  pneumovax 23, done on 05-. Entered in [employer].

## 2019-11-18 NOTE — PROGRESS NOTES
CHIEF COMPLAINT:     Patient presents with:  Hypertension: BP check. Pt knocked over into sink down the drain, Metoprolol and Bupropion fell into drain. May be out earlier.        HPI:   Colene Pallas is a 76year old female   Patient presents for CAPSULE BY MOUTH DAILY AS NEEDED FOR ITCHING 90 capsule 0   • HYDROXYZINE PAMOATE 25 MG Oral Cap TAKE ONE CAPSULE BY MOUTH DAILY AS NEEDED FOR ITCHING 90 capsule 0   • folic acid 1 MG Oral Tab Take 1 tablet by mouth daily.      • Estradiol 0.1 MG/GM Vaginal HPI    EXAM:   /78 (BP Location: Left arm, Patient Position: Sitting, Cuff Size: adult)   Pulse 72   Temp 98.6 °F (37 °C) (Oral)   Resp 16   Ht 62\"   Wt 155 lb 8 oz (70.5 kg)   LMP 03/16/2000 (Approximate)   SpO2 97%   BMI 28.44 kg/m²   GENERAL: wel

## 2019-12-05 DIAGNOSIS — I10 ESSENTIAL HYPERTENSION: ICD-10-CM

## 2019-12-05 RX ORDER — METOPROLOL TARTRATE 50 MG/1
TABLET, FILM COATED ORAL
Qty: 90 TABLET | Refills: 1 | Status: SHIPPED | OUTPATIENT
Start: 2019-12-05 | End: 2020-05-29

## 2019-12-05 NOTE — TELEPHONE ENCOUNTER
Metoprolol 50 Mg  Last OV relevant to medication: 11-18-19  Last refill date: 9-10-19  #/refills: 0  When pt was asked to return for OV: 6 mo.   Upcoming appt/reason: none  Recent labs: 7-23-19: CMP

## 2019-12-09 RX ORDER — BUPROPION HYDROCHLORIDE 150 MG/1
TABLET, EXTENDED RELEASE ORAL
Qty: 30 TABLET | Refills: 2 | Status: SHIPPED | OUTPATIENT
Start: 2019-12-09 | End: 2020-05-27

## 2019-12-09 NOTE — TELEPHONE ENCOUNTER
Bupropion HCl ER,  Mg  Last OV relevant to medication: 11-18-19  Last refill date: 11-12-19  #/refills: 0  When pt was asked to return for OV: 6 mo.   Upcoming appt/reason: none  Recent labs: none

## 2019-12-24 ENCOUNTER — HOSPITAL ENCOUNTER (EMERGENCY)
Facility: HOSPITAL | Age: 74
Discharge: HOME | End: 2019-12-24
Attending: EMERGENCY MEDICINE
Payer: MEDICARE

## 2019-12-24 ENCOUNTER — APPOINTMENT (OUTPATIENT)
Dept: CT IMAGING | Facility: HOSPITAL | Age: 74
End: 2019-12-24
Attending: EMERGENCY MEDICINE
Payer: MEDICARE

## 2019-12-24 VITALS
OXYGEN SATURATION: 97 % | TEMPERATURE: 98 F | SYSTOLIC BLOOD PRESSURE: 149 MMHG | DIASTOLIC BLOOD PRESSURE: 87 MMHG | RESPIRATION RATE: 18 BRPM | HEART RATE: 49 BPM | WEIGHT: 135 LBS | BODY MASS INDEX: 24.84 KG/M2 | HEIGHT: 62 IN

## 2019-12-24 DIAGNOSIS — K57.92 ACUTE DIVERTICULITIS: Primary | ICD-10-CM

## 2019-12-24 PROCEDURE — 80053 COMPREHEN METABOLIC PANEL: CPT | Performed by: EMERGENCY MEDICINE

## 2019-12-24 PROCEDURE — 99284 EMERGENCY DEPT VISIT MOD MDM: CPT

## 2019-12-24 PROCEDURE — 74177 CT ABD & PELVIS W/CONTRAST: CPT | Performed by: EMERGENCY MEDICINE

## 2019-12-24 PROCEDURE — 36415 COLL VENOUS BLD VENIPUNCTURE: CPT

## 2019-12-24 PROCEDURE — 83605 ASSAY OF LACTIC ACID: CPT | Performed by: EMERGENCY MEDICINE

## 2019-12-24 PROCEDURE — 85025 COMPLETE CBC W/AUTO DIFF WBC: CPT | Performed by: EMERGENCY MEDICINE

## 2019-12-24 RX ORDER — METRONIDAZOLE 500 MG/1
500 TABLET ORAL 3 TIMES DAILY
Qty: 30 TABLET | Refills: 0 | Status: SHIPPED | OUTPATIENT
Start: 2019-12-24 | End: 2020-01-03

## 2019-12-24 RX ORDER — METRONIDAZOLE 500 MG/1
500 TABLET ORAL ONCE
Status: COMPLETED | OUTPATIENT
Start: 2019-12-24 | End: 2019-12-24

## 2019-12-24 RX ORDER — CIPROFLOXACIN 500 MG/1
500 TABLET, FILM COATED ORAL 2 TIMES DAILY
Qty: 20 TABLET | Refills: 0 | Status: SHIPPED | OUTPATIENT
Start: 2019-12-24 | End: 2020-01-03

## 2019-12-24 RX ORDER — CIPROFLOXACIN 500 MG/1
500 TABLET, FILM COATED ORAL EVERY 12 HOURS SCHEDULED
Status: DISCONTINUED | OUTPATIENT
Start: 2019-12-24 | End: 2019-12-24

## 2019-12-24 RX ORDER — CIPROFLOXACIN 500 MG/1
500 TABLET, FILM COATED ORAL ONCE
Status: COMPLETED | OUTPATIENT
Start: 2019-12-24 | End: 2019-12-24

## 2019-12-24 NOTE — ED PROVIDER NOTES
Patient Seen in: [facility] Emergency Department      History   Patient presents with:  Abdomen/Flank Pain  Fever    Stated Complaint: abd pain/fever    HPI    Patient is a 60-year-old female history of diverticulitis here concerned recurrent divert Yes      Comment: occ    Drug use: No             Review of Systems    Positive for stated complaint: abd pain/fever  Other systems are as noted in HPI. Constitutional and vital signs reviewed.       All other systems reviewed and negative except as noted PLATELET.   Procedure                               Abnormality         Status                     ---------                               -----------         ------                     CBC W/ DIFFERENTIAL[[id number]]          Abnormal            Final resul times daily for 10 days. , Normal, Disp-20 tablet, R-0    metRONIDAZOLE 500 MG Oral Tab  Take 1 tablet (500 mg total) by mouth 3 (three) times daily for 10 days. , Normal, Disp-30 tablet, R-0

## 2019-12-24 NOTE — ED INITIAL ASSESSMENT (HPI)
Patient reports she was at [facility] ER and was diagnosed with diverticulitis and was placed on 2 antibiotics. States that she has been having intermittent lower abdominal cramping since Sunday with a low grade fever yesterday.  Reports scant bowel moveme

## 2020-01-09 NOTE — PROGRESS NOTES
Joselito Schwartz is a 68year old female who presents for diarrhea. The patient complaints of abdominal pain. Pain is located at RLQ, LLQ. Pain is described as aching, cramping. Severity is moderate. Associated symptoms: diarrhea, dry heaves.  Blanquita Patient here today for EKG test.     07/25/2018 19   10/06/2017 23   11/17/2015 15   11/17/2015      Comment:       Specimen hemolyzed. Hemolysis will interfere with AST results. Specimen should be recollected.        07/16/2015 25   05/23/2014 14 (L)   11/13/2013 23     ALT (U/L)   Date Annabelle Chest pain     admitt cp normal w/u   • Depression    • Essential hypertension    • Heart murmur    • Hernia, hiatal    • High blood pressure    • History of cardiac cath 3/2010    normal   • Hives       Past Surgical History:   Procedure Laterality Date exertion  CARDIOVASCULAR: denies chest pain on exertion  GI: as above. No heartburn. No melena, no rectal bleeding. No vomiting. :no dysuria.   MUSCULOSKELETAL: no myalgia  NEURO: denies headaches      EXAM:   /62   Pulse 60   Temp 98.7 °F (37.1 °C) (

## 2020-01-10 ENCOUNTER — APPOINTMENT (OUTPATIENT)
Dept: GENERAL RADIOLOGY | Age: 75
End: 2020-01-10
Attending: NURSE PRACTITIONER
Payer: MEDICARE

## 2020-01-10 ENCOUNTER — HOSPITAL ENCOUNTER (OUTPATIENT)
Age: 75
Discharge: HOME | End: 2020-01-10
Payer: MEDICARE

## 2020-01-10 ENCOUNTER — TELEPHONE (OUTPATIENT)
Dept: INTERNAL MEDICINE CLINIC | Facility: CLINIC | Age: 75
End: 2020-01-10

## 2020-01-10 VITALS
BODY MASS INDEX: 25.76 KG/M2 | TEMPERATURE: 98 F | SYSTOLIC BLOOD PRESSURE: 141 MMHG | DIASTOLIC BLOOD PRESSURE: 79 MMHG | HEIGHT: 62 IN | RESPIRATION RATE: 16 BRPM | HEART RATE: 56 BPM | WEIGHT: 140 LBS | OXYGEN SATURATION: 95 %

## 2020-01-10 DIAGNOSIS — R00.2 PALPITATIONS: Primary | ICD-10-CM

## 2020-01-10 LAB
#MXD IC: 0.8 X10ˆ3/UL (ref 0.1–1)
CREAT BLD-MCNC: 0.9 MG/DL (ref 0.55–1.02)
GLUCOSE BLD-MCNC: 82 MG/DL (ref 70–99)
HCT VFR BLD AUTO: 40.6 % (ref 35–48)
HGB BLD-MCNC: 13.5 G/DL (ref 12–16)
ISTAT BUN: 16 MG/DL (ref 8–20)
ISTAT CHLORIDE: 106 MMOL/L (ref 101–111)
ISTAT HEMATOCRIT: 40 % (ref 34–50)
ISTAT IONIZED CALCIUM FOR CHEM 8: 1.21 MMOL/L (ref 1.12–1.32)
ISTAT POTASSIUM: 3.5 MMOL/L (ref 3.6–5.1)
ISTAT SODIUM: 140 MMOL/L (ref 136–145)
ISTAT TROPONIN: <0.1 NG/ML (ref ?–0.1)
LYMPHOCYTES # BLD AUTO: 1.8 X10ˆ3/UL (ref 1–4)
LYMPHOCYTES NFR BLD AUTO: 27.2 %
MCH RBC QN AUTO: 30.5 PG (ref 26–34)
MCHC RBC AUTO-ENTMCNC: 33.3 G/DL (ref 31–37)
MCV RBC AUTO: 91.6 FL (ref 80–100)
MIXED CELL %: 11.3 %
NEUTROPHILS # BLD AUTO: 4.1 X10ˆ3/UL (ref 1.5–7.7)
NEUTROPHILS NFR BLD AUTO: 61.5 %
PLATELET # BLD AUTO: 275 X10ˆ3/UL (ref 150–450)
RBC # BLD AUTO: 4.43 X10ˆ6/UL (ref 3.8–5.3)
WBC # BLD AUTO: 6.7 X10ˆ3/UL (ref 4–11)

## 2020-01-10 PROCEDURE — 84484 ASSAY OF TROPONIN QUANT: CPT

## 2020-01-10 PROCEDURE — 99211 OFF/OP EST MAY X REQ PHY/QHP: CPT

## 2020-01-10 PROCEDURE — 71046 X-RAY EXAM CHEST 2 VIEWS: CPT | Performed by: NURSE PRACTITIONER

## 2020-01-10 PROCEDURE — 99215 OFFICE O/P EST HI 40 MIN: CPT

## 2020-01-10 PROCEDURE — 93005 ELECTROCARDIOGRAM TRACING: CPT

## 2020-01-10 PROCEDURE — 85025 COMPLETE CBC W/AUTO DIFF WBC: CPT | Performed by: NURSE PRACTITIONER

## 2020-01-10 PROCEDURE — 80047 BASIC METABLC PNL IONIZED CA: CPT

## 2020-01-10 PROCEDURE — 36415 COLL VENOUS BLD VENIPUNCTURE: CPT

## 2020-01-10 PROCEDURE — 93010 ELECTROCARDIOGRAM REPORT: CPT

## 2020-01-11 LAB
ATRIAL RATE: 64 BPM
P AXIS: 2 DEGREES
P-R INTERVAL: 210 MS
Q-T INTERVAL: 454 MS
QRS DURATION: 110 MS
QTC CALCULATION (BEZET): 468 MS
R AXIS: -51 DEGREES
T AXIS: 25 DEGREES
VENTRICULAR RATE: 64 BPM

## 2020-01-11 NOTE — ED PROVIDER NOTES
Patient Seen in: [facility] END      History   Patient presents with:  Arrythmia/Palpitations    Stated Complaint: c/o Heart Palpitations 3days    59-year-old female who presents to the immediate care with complaints of palpitation 3/26/2015    Performed by Arelis Martinez MD at [address] [place] [address]   • JEJUNOSTOMY/GASTROSTOMY TUBE INSERTION N/A 5/23/2014    Performed by Arelis Martinez MD at [facility] MAIN OR   • OTHER SURGICAL HISTORY  3/4/13    ED[facility] - Dr. Nga Sahni lap repair diag hernia, lap nissen murmur. ABDOMEN: Abdomen is soft, nontender. Patient has normal bowel sounds. There is no abdominal distention. SKIN: There is no rash. There is no edema. There is no diaphoresis. NEURO: The patient is awake, alert, and oriented.   The patient is  attending. Recommends to check patient's electrolytes, troponin and chest x-ray if findings normal patient can have close follow-up with home.           MDM   I have discussed with the patient and/or family the results of test, differential diagnosis, marylin

## 2020-01-11 NOTE — ED INITIAL ASSESSMENT (HPI)
Pt with heart palpitations and fatigue for a few days off and on; denies chest pain/sob    Pt was on 2nd round of antibiotics for diverticulitis - completed 5 days ago

## 2020-01-15 ENCOUNTER — OFFICE VISIT (OUTPATIENT)
Dept: INTERNAL MEDICINE CLINIC | Facility: CLINIC | Age: 75
End: 2020-01-15
Payer: MEDICARE

## 2020-01-15 VITALS
HEIGHT: 62 IN | TEMPERATURE: 98 F | BODY MASS INDEX: 28.25 KG/M2 | OXYGEN SATURATION: 98 % | HEART RATE: 82 BPM | RESPIRATION RATE: 18 BRPM | SYSTOLIC BLOOD PRESSURE: 138 MMHG | DIASTOLIC BLOOD PRESSURE: 72 MMHG | WEIGHT: 153.5 LBS

## 2020-01-15 DIAGNOSIS — R00.2 PALPITATIONS: Primary | ICD-10-CM

## 2020-01-15 DIAGNOSIS — R01.1 MURMUR: ICD-10-CM

## 2020-01-15 PROCEDURE — 99213 OFFICE O/P EST LOW 20 MIN: CPT | Performed by: NURSE PRACTITIONER

## 2020-01-15 NOTE — PROGRESS NOTES
CHIEF COMPLAINT:     Patient presents with: Follow - Up: DAYA F/U Palpitations --1/10/2020      HPI:   Bolivar Michel is a 76year old female patient presents for a recheck after Urgent Care visit for diagnosis of palpitations. Was seen 5 days ago. Oral Tab Take one tablet by mouth at onset of migraine. *May repeat in 2 hours. * 10 tablet 4   • Multiple Vitamin Oral Tab Take 1 tablet by mouth daily. • Ergocalciferol (VITAMIN D OR) Take 3,000 Units by mouth daily.        • ZINC ACETATE OR Take 1 ta these issues and agrees to the plan. The patient is asked to return for worsening or new symptoms. Diabetes    Hypertension    Lymphedema of leg

## 2020-01-24 NOTE — TELEPHONE ENCOUNTER
Hydroxyzine 25 Mg  Last OV relevant to medication: 11-18-19  Last refill date: 8-21-19 #/refills: 0  When pt was asked to return for OV: 6 mo.   Upcoming appt/reason: none  Recent labs: none

## 2020-01-25 RX ORDER — HYDROXYZINE HYDROCHLORIDE 25 MG/1
TABLET, FILM COATED ORAL
Qty: 90 TABLET | Refills: 0 | Status: SHIPPED | OUTPATIENT
Start: 2020-01-25 | End: 2020-04-12

## 2020-02-07 ENCOUNTER — HOSPITAL ENCOUNTER (OUTPATIENT)
Dept: CV DIAGNOSTICS | Facility: HOSPITAL | Age: 75
Discharge: HOME | End: 2020-02-07
Attending: NURSE PRACTITIONER
Payer: MEDICARE

## 2020-02-07 DIAGNOSIS — R00.2 PALPITATIONS: ICD-10-CM

## 2020-02-07 DIAGNOSIS — R01.1 MURMUR: ICD-10-CM

## 2020-02-07 PROCEDURE — 93225 XTRNL ECG REC<48 HRS REC: CPT | Performed by: NURSE PRACTITIONER

## 2020-02-07 PROCEDURE — 93226 XTRNL ECG REC<48 HR SCAN A/R: CPT | Performed by: NURSE PRACTITIONER

## 2020-02-07 PROCEDURE — 93306 TTE W/DOPPLER COMPLETE: CPT | Performed by: NURSE PRACTITIONER

## 2020-02-07 PROCEDURE — 93227 XTRNL ECG REC<48 HR R&I: CPT | Performed by: NURSE PRACTITIONER

## 2020-02-11 PROBLEM — I27.20 PULMONARY HYPERTENSION (HCC): Status: ACTIVE | Noted: 2020-02-11

## 2020-02-18 ENCOUNTER — APPOINTMENT (OUTPATIENT)
Dept: GENERAL RADIOLOGY | Age: 75
End: 2020-02-18
Attending: EMERGENCY MEDICINE
Payer: MEDICARE

## 2020-02-18 ENCOUNTER — HOSPITAL ENCOUNTER (OUTPATIENT)
Age: 75
Discharge: HOME | End: 2020-02-18
Attending: EMERGENCY MEDICINE
Payer: MEDICARE

## 2020-02-18 ENCOUNTER — APPOINTMENT (OUTPATIENT)
Dept: CT IMAGING | Age: 75
End: 2020-02-18
Attending: EMERGENCY MEDICINE
Payer: MEDICARE

## 2020-02-18 VITALS
TEMPERATURE: 98 F | DIASTOLIC BLOOD PRESSURE: 88 MMHG | RESPIRATION RATE: 16 BRPM | HEART RATE: 108 BPM | OXYGEN SATURATION: 98 % | SYSTOLIC BLOOD PRESSURE: 140 MMHG

## 2020-02-18 DIAGNOSIS — S09.90XA INJURY OF HEAD, INITIAL ENCOUNTER: ICD-10-CM

## 2020-02-18 DIAGNOSIS — S70.02XA CONTUSION OF LEFT HIP, INITIAL ENCOUNTER: Primary | ICD-10-CM

## 2020-02-18 PROCEDURE — 99214 OFFICE O/P EST MOD 30 MIN: CPT

## 2020-02-18 PROCEDURE — 70450 CT HEAD/BRAIN W/O DYE: CPT | Performed by: EMERGENCY MEDICINE

## 2020-02-18 PROCEDURE — 73502 X-RAY EXAM HIP UNI 2-3 VIEWS: CPT | Performed by: EMERGENCY MEDICINE

## 2020-02-18 RX ORDER — HYDROCODONE BITARTRATE AND ACETAMINOPHEN 5; 325 MG/1; MG/1
1-2 TABLET ORAL EVERY 6 HOURS PRN
Qty: 10 TABLET | Refills: 0 | Status: SHIPPED | OUTPATIENT
Start: 2020-02-18 | End: 2020-02-25

## 2020-02-18 RX ORDER — ACETAMINOPHEN 500 MG
1000 TABLET ORAL ONCE
Status: COMPLETED | OUTPATIENT
Start: 2020-02-18 | End: 2020-02-18

## 2020-02-19 NOTE — ED PROVIDER NOTES
Patient Seen in: [facility]      History   Patient presents with:  Fall    Stated Complaint: Hip & head pain (Fall)     HPI    This is a 27-year-old woman, history of hypertension, here for evaluation after fall.   She states thi Smokeless tobacco: Never Used    Alcohol use: Yes      Comment: occ    Drug use: No             Review of Systems    Positive for stated complaint: Hip & head pain (Fall)   Other systems are as noted in HPI. Constitutional and vital signs reviewed.       A Noncontrast CT scanning is performed through the brain. Dose reduction techniques were used. Dose information is transmitted to the [address] of Radiology) NRDR ([address]) which includes the Dose Index Registry.   PATIENT 2/18/2020 at 20:48                MDM     This is a 27-year-old woman, here for evaluation after fall, complains of pain to the head and left hip.   She is ambulatory, CT of the head shows no acute injury, x-ray of the left hip and pelvis also with no evide

## 2020-02-19 NOTE — ED INITIAL ASSESSMENT (HPI)
c/o slipped on ice around 1230 PM today, fell and landed backwards, with left groin and pelvic pain. Hurts to put weights to left leg. Denies loss of consciousness.

## 2020-03-06 ENCOUNTER — OFFICE VISIT (OUTPATIENT)
Dept: INTERNAL MEDICINE CLINIC | Facility: CLINIC | Age: 75
End: 2020-03-06
Payer: MEDICARE

## 2020-03-06 VITALS
HEART RATE: 60 BPM | WEIGHT: 155.25 LBS | SYSTOLIC BLOOD PRESSURE: 114 MMHG | DIASTOLIC BLOOD PRESSURE: 64 MMHG | HEIGHT: 62 IN | BODY MASS INDEX: 28.57 KG/M2 | TEMPERATURE: 99 F

## 2020-03-06 DIAGNOSIS — L85.3 DRY SKIN: Primary | ICD-10-CM

## 2020-03-06 PROCEDURE — 99213 OFFICE O/P EST LOW 20 MIN: CPT | Performed by: FAMILY MEDICINE

## 2020-03-06 NOTE — PROGRESS NOTES
HPI:    Patient ID: Sonia Syed is a 76year old female.     HPI  Here for itching sensation for some time, maybe a yr or more , dory when goes to bed     Tried benadryl and restoril    No rash    Used HC cream on the elbow and did help     Mostly OTHER (SEE COMMENTS)  Ampicillin-Sulbacta*    RASH  Darvocet [Propoxyph*    OTHER (SEE COMMENTS)    Comment:Headache  Sulfa [facility] Cross [facility]*        Comment:GI upset  Pravastatin             DIARRHEA   PHYSICAL EXAM:   Physical Exam   Neurological: She is

## 2020-04-12 RX ORDER — HYDROXYZINE HYDROCHLORIDE 25 MG/1
TABLET, FILM COATED ORAL
Qty: 90 TABLET | Refills: 0 | Status: SHIPPED | OUTPATIENT
Start: 2020-04-12 | End: 2020-07-02

## 2020-05-08 DIAGNOSIS — G47.00 INSOMNIA, UNSPECIFIED TYPE: ICD-10-CM

## 2020-05-10 RX ORDER — TEMAZEPAM 30 MG/1
CAPSULE ORAL
Qty: 90 CAPSULE | Refills: 0 | Status: SHIPPED | OUTPATIENT
Start: 2020-05-10 | End: 2020-12-27

## 2020-05-27 RX ORDER — BUPROPION HYDROCHLORIDE 150 MG/1
150 TABLET, EXTENDED RELEASE ORAL DAILY
Qty: 30 TABLET | Refills: 1 | Status: SHIPPED | OUTPATIENT
Start: 2020-05-27 | End: 2020-08-24

## 2020-05-27 NOTE — TELEPHONE ENCOUNTER
Bupropion er 150 mg 1 tab daily filled 12-9-19 30 with 2 refills     LOV 1-15-20   return for worsening or new symptoms.   No upcoming apt on file   Labs 12-24-19

## 2020-05-29 DIAGNOSIS — I10 ESSENTIAL HYPERTENSION: ICD-10-CM

## 2020-05-29 RX ORDER — METOPROLOL TARTRATE 50 MG/1
TABLET, FILM COATED ORAL
Qty: 90 TABLET | Refills: 0 | Status: SHIPPED | OUTPATIENT
Start: 2020-05-29 | End: 2020-09-28

## 2020-05-29 NOTE — TELEPHONE ENCOUNTER
Metoprolol 50 mg  Last OV relevant to medication: 11-18-19  Last refill date: 12-5-19 #/refills:1  When pt was asked to return for OV: 6 mo.   Upcoming appt/reason: none  Recent labs: 12-24-19: CMP

## 2020-07-02 RX ORDER — HYDROXYZINE HYDROCHLORIDE 25 MG/1
TABLET, FILM COATED ORAL
Qty: 90 TABLET | Refills: 0 | Status: SHIPPED | OUTPATIENT
Start: 2020-07-02 | End: 2020-09-28

## 2020-08-06 ENCOUNTER — TELEPHONE (OUTPATIENT)
Dept: INTERNAL MEDICINE CLINIC | Facility: CLINIC | Age: 75
End: 2020-08-06

## 2020-08-06 NOTE — TELEPHONE ENCOUNTER
Patient would like a recommendation for a specialist to help her with pain in area of neck/ shoulder pain? She asks if nurse could call her and leave message if she doesn't answer with information so she doesn't have to play \"phone tag. \"

## 2020-08-07 NOTE — TELEPHONE ENCOUNTER
Onset: 2 months ago  After doing yoga  R shoulder pain as well. While lying flat, pain present as well. Pt was advised to come in for a in-office visit and further assessment. Pt agreeable and set an appointment.

## 2020-08-10 ENCOUNTER — OFFICE VISIT (OUTPATIENT)
Dept: INTERNAL MEDICINE CLINIC | Facility: CLINIC | Age: 75
End: 2020-08-10
Payer: MEDICARE

## 2020-08-10 VITALS
HEART RATE: 62 BPM | WEIGHT: 157.5 LBS | OXYGEN SATURATION: 98 % | RESPIRATION RATE: 16 BRPM | HEIGHT: 62 IN | SYSTOLIC BLOOD PRESSURE: 130 MMHG | DIASTOLIC BLOOD PRESSURE: 80 MMHG | TEMPERATURE: 99 F | BODY MASS INDEX: 28.98 KG/M2

## 2020-08-10 DIAGNOSIS — M25.551 HIP PAIN, RIGHT: ICD-10-CM

## 2020-08-10 DIAGNOSIS — M25.512 ACUTE PAIN OF LEFT SHOULDER: Primary | ICD-10-CM

## 2020-08-10 DIAGNOSIS — M25.532 LEFT WRIST PAIN: ICD-10-CM

## 2020-08-10 PROCEDURE — 3075F SYST BP GE 130 - 139MM HG: CPT | Performed by: FAMILY MEDICINE

## 2020-08-10 PROCEDURE — 99214 OFFICE O/P EST MOD 30 MIN: CPT | Performed by: FAMILY MEDICINE

## 2020-08-10 PROCEDURE — 3008F BODY MASS INDEX DOCD: CPT | Performed by: FAMILY MEDICINE

## 2020-08-10 PROCEDURE — 3079F DIAST BP 80-89 MM HG: CPT | Performed by: FAMILY MEDICINE

## 2020-08-10 NOTE — PROGRESS NOTES
CHIEF COMPLAINT:     Patient presents with:  Pain: right shoulder pain, right hip, left wrist strain       HPI:   Leonardo Barajas is a 76year old female . The patient has complaints of left  wrist pain. Pain started 2 weeks ago.  Inciting event was APPT DUE IN JULY 30 tablet 1   • folic acid 1 MG Oral Tab Take 1 tablet by mouth daily. • Estradiol 0.1 MG/GM Vaginal Cream      • Omega-3 Fatty Acids (FISH OIL OR) Take 1-2 capsules by mouth daily.      • Probiotic Oral Cap Take 1 capsule by mouth daylin 28.81 kg/m²   GENERAL: well developed, well nourished,in no apparent distress  SKIN: no rashes,no suspicious lesions  HEAD: atraumatic, normocephalic.   NECK: supple, non-tender, but muscles are tense on the right side  LUNGS: clear to auscultation bilatera

## 2020-08-11 ENCOUNTER — HOSPITAL ENCOUNTER (OUTPATIENT)
Dept: GENERAL RADIOLOGY | Age: 75
Discharge: HOME | End: 2020-08-11
Attending: FAMILY MEDICINE
Payer: MEDICARE

## 2020-08-11 DIAGNOSIS — M25.512 ACUTE PAIN OF LEFT SHOULDER: ICD-10-CM

## 2020-08-11 PROCEDURE — 73030 X-RAY EXAM OF SHOULDER: CPT | Performed by: FAMILY MEDICINE

## 2020-08-24 ENCOUNTER — TELEPHONE (OUTPATIENT)
Dept: INTERNAL MEDICINE CLINIC | Facility: CLINIC | Age: 75
End: 2020-08-24

## 2020-08-24 DIAGNOSIS — M25.551 HIP PAIN, RIGHT: Primary | ICD-10-CM

## 2020-08-24 RX ORDER — BUPROPION HYDROCHLORIDE 150 MG/1
TABLET, EXTENDED RELEASE ORAL
Qty: 30 TABLET | Refills: 3 | Status: SHIPPED | OUTPATIENT
Start: 2020-08-24 | End: 2020-12-13

## 2020-08-24 NOTE — TELEPHONE ENCOUNTER
Pt calling in stated when she walks up the stairs, her right hip gives her trouble, and she feels like she needs to limp on it in order to reach the steps. Please call pt to discuss further.

## 2020-08-25 NOTE — TELEPHONE ENCOUNTER
Pt agreeable and will call back w the name of the PT location where she would like to proceed. PT needed for R hip and L shoulder.

## 2020-08-25 NOTE — TELEPHONE ENCOUNTER
Pt was seen in-office on 8/10/20, per SM's note:    \"Consider PT, if pain worsens\"    [id number] [address] PT?

## 2020-09-03 NOTE — TELEPHONE ENCOUNTER
Patient calling in asking for the PT order to be placed for [facility]. Pt stated the location is near her home, and it would be more convenient. Please call pt with order status.

## 2020-09-15 ENCOUNTER — TELEPHONE (OUTPATIENT)
Dept: INTERNAL MEDICINE CLINIC | Facility: CLINIC | Age: 75
End: 2020-09-15

## 2020-09-15 DIAGNOSIS — M25.551 HIP PAIN, RIGHT: Primary | ICD-10-CM

## 2020-09-15 DIAGNOSIS — M25.512 ACUTE PAIN OF LEFT SHOULDER: ICD-10-CM

## 2020-09-25 ENCOUNTER — TELEPHONE (OUTPATIENT)
Dept: INTERNAL MEDICINE CLINIC | Facility: CLINIC | Age: 75
End: 2020-09-25

## 2020-09-25 DIAGNOSIS — I10 ESSENTIAL HYPERTENSION: ICD-10-CM

## 2020-09-26 RX ORDER — METOPROLOL TARTRATE 50 MG/1
TABLET, FILM COATED ORAL
Qty: 90 TABLET | Refills: 0 | OUTPATIENT
Start: 2020-09-26

## 2020-09-26 RX ORDER — HYDROXYZINE HYDROCHLORIDE 25 MG/1
TABLET, FILM COATED ORAL
Qty: 90 TABLET | Refills: 0 | OUTPATIENT
Start: 2020-09-26

## 2020-09-26 NOTE — TELEPHONE ENCOUNTER
Hydroxyzine HCL 25mg last filled 7/2/20 #90     Pt due for MAS      Last labs 6/25/19     Ok to refill once Px is scheduled.  Per Asim Lugo

## 2020-09-26 NOTE — TELEPHONE ENCOUNTER
Metoprolol last filled 5/29/20 #90     LOV 8/10/20 - shoulder, hip and wrist pain     Upcoming appt - none     Last CMP 12/24/19

## 2020-09-28 RX ORDER — METOPROLOL TARTRATE 50 MG/1
50 TABLET, FILM COATED ORAL DAILY
Qty: 90 TABLET | Refills: 0 | Status: SHIPPED | OUTPATIENT
Start: 2020-09-28 | End: 2020-12-22

## 2020-09-28 RX ORDER — HYDROXYZINE HYDROCHLORIDE 25 MG/1
25 TABLET, FILM COATED ORAL 3 TIMES DAILY PRN
Qty: 90 TABLET | Refills: 0 | Status: SHIPPED | OUTPATIENT
Start: 2020-09-28 | End: 2020-12-02

## 2020-09-28 NOTE — TELEPHONE ENCOUNTER
Patient scheduled her MA Supervisit with SM please refill   by mouth daily.           Metoprolol Tartrate (Tab) LOPRESSOR 50 MG       HYDROXYZINE HCL 25 MG Oral Tab   please refill today per patient request

## 2020-10-01 PROBLEM — D23.9 DERMATOFIBROMA: Status: ACTIVE | Noted: 2020-10-01

## 2020-10-01 PROBLEM — H25.9 AGE-RELATED CATARACT: Status: ACTIVE | Noted: 2020-10-01

## 2020-10-01 PROBLEM — N95.2 POSTMENOPAUSAL ATROPHIC VAGINITIS: Status: ACTIVE | Noted: 2020-10-01

## 2020-10-06 ENCOUNTER — OFFICE VISIT (OUTPATIENT)
Dept: INTERNAL MEDICINE CLINIC | Facility: CLINIC | Age: 75
End: 2020-10-06
Payer: MEDICARE

## 2020-10-06 VITALS
DIASTOLIC BLOOD PRESSURE: 82 MMHG | TEMPERATURE: 99 F | RESPIRATION RATE: 20 BRPM | HEIGHT: 62 IN | WEIGHT: 158.75 LBS | HEART RATE: 72 BPM | BODY MASS INDEX: 29.21 KG/M2 | SYSTOLIC BLOOD PRESSURE: 118 MMHG

## 2020-10-06 DIAGNOSIS — D23.9 DERMATOFIBROMA: ICD-10-CM

## 2020-10-06 DIAGNOSIS — M25.532 LEFT WRIST PAIN: ICD-10-CM

## 2020-10-06 DIAGNOSIS — K21.00 GASTROESOPHAGEAL REFLUX DISEASE WITH ESOPHAGITIS WITHOUT HEMORRHAGE: ICD-10-CM

## 2020-10-06 DIAGNOSIS — I10 ESSENTIAL HYPERTENSION: Primary | ICD-10-CM

## 2020-10-06 DIAGNOSIS — R01.1 MURMUR: ICD-10-CM

## 2020-10-06 DIAGNOSIS — F33.9 EPISODE OF RECURRENT MAJOR DEPRESSIVE DISORDER, UNSPECIFIED DEPRESSION EPISODE SEVERITY (HCC): ICD-10-CM

## 2020-10-06 DIAGNOSIS — IMO0002 OSTEOARTHROSIS INVOLVING LOWER LEG: ICD-10-CM

## 2020-10-06 DIAGNOSIS — G43.009 MIGRAINE WITHOUT AURA AND WITHOUT STATUS MIGRAINOSUS, NOT INTRACTABLE: ICD-10-CM

## 2020-10-06 DIAGNOSIS — M25.551 HIP PAIN, RIGHT: ICD-10-CM

## 2020-10-06 DIAGNOSIS — J30.9 ALLERGIC RHINITIS, UNSPECIFIED SEASONALITY, UNSPECIFIED TRIGGER: ICD-10-CM

## 2020-10-06 DIAGNOSIS — I70.0 ATHEROSCLEROSIS OF ABDOMINAL AORTA (HCC): ICD-10-CM

## 2020-10-06 DIAGNOSIS — F33.0 MILD EPISODE OF RECURRENT MAJOR DEPRESSIVE DISORDER (HCC): ICD-10-CM

## 2020-10-06 DIAGNOSIS — Z00.00 ENCOUNTER FOR ANNUAL HEALTH EXAMINATION: ICD-10-CM

## 2020-10-06 DIAGNOSIS — I27.20 PULMONARY HYPERTENSION (HCC): ICD-10-CM

## 2020-10-06 DIAGNOSIS — K44.9 DIAPHRAGMATIC HERNIA WITHOUT OBSTRUCTION AND WITHOUT GANGRENE: ICD-10-CM

## 2020-10-06 DIAGNOSIS — K57.30 DIVERTICULOSIS OF COLON: ICD-10-CM

## 2020-10-06 DIAGNOSIS — E78.5 HYPERLIPIDEMIA, UNSPECIFIED HYPERLIPIDEMIA TYPE: ICD-10-CM

## 2020-10-06 DIAGNOSIS — F41.1 GENERALIZED ANXIETY DISORDER: ICD-10-CM

## 2020-10-06 DIAGNOSIS — L50.1 IDIOPATHIC URTICARIA: ICD-10-CM

## 2020-10-06 DIAGNOSIS — R73.01 IMPAIRED FASTING GLUCOSE: ICD-10-CM

## 2020-10-06 DIAGNOSIS — G47.00 INSOMNIA, UNSPECIFIED TYPE: ICD-10-CM

## 2020-10-06 DIAGNOSIS — R00.0 TACHYCARDIA: ICD-10-CM

## 2020-10-06 DIAGNOSIS — M81.6 LOCALIZED OSTEOPOROSIS WITHOUT CURRENT PATHOLOGICAL FRACTURE: ICD-10-CM

## 2020-10-06 PROCEDURE — G0439 PPPS, SUBSEQ VISIT: HCPCS | Performed by: FAMILY MEDICINE

## 2020-10-06 PROCEDURE — 3079F DIAST BP 80-89 MM HG: CPT | Performed by: FAMILY MEDICINE

## 2020-10-06 PROCEDURE — 99397 PER PM REEVAL EST PAT 65+ YR: CPT | Performed by: FAMILY MEDICINE

## 2020-10-06 PROCEDURE — 96160 PT-FOCUSED HLTH RISK ASSMT: CPT | Performed by: FAMILY MEDICINE

## 2020-10-06 PROCEDURE — 3074F SYST BP LT 130 MM HG: CPT | Performed by: FAMILY MEDICINE

## 2020-10-06 PROCEDURE — 3008F BODY MASS INDEX DOCD: CPT | Performed by: FAMILY MEDICINE

## 2020-10-06 NOTE — PROGRESS NOTES
HPI:   Celia Ambriz is a 76year old female who presents for a MA (Medicare Advantage) Supervisit (Once per calendar year). The patient has complaints of left wrist pain. Pain started beginning of August. Inciting event was: Pt not sure.  She d normal cognitive assessment- see flowsheet entries    Functional Ability/Status   Joselito Schwartz has some abnormal functions as listed below:  She has Vision problems based on screening of functional status.    Vision Problems? : Yes(reading glasses Pulmonary hypertension (HCC)     Postmenopausal atrophic vaginitis     Age-related cataract     Dermatofibroma    Wt Readings from Last 3 Encounters:  10/06/20 : 158 lb 12 oz (72 kg)  08/10/20 : 157 lb 8 oz (71.4 kg)  03/06/20 : 155 lb 4 oz (70.4 kg)     L Chest pain, Depression, Essential hypertension, Heart murmur, Hernia, hiatal, High blood pressure, History of cardiac cath (3/2010), and Hives.     She  has a past surgical history that includes colonoscopy (2001, 1/12, 10/17); correct bunion,simple; hemorr General Appearance:  Alert, cooperative, no distress, appears stated age   Head:  Normocephalic, without obvious abnormality, atraumatic   Eyes:  PERRL, conjunctiva/corneas clear, EOM's intact both eyes   Ears:  Normal TM's and externa ASSESSMENT AND OTHER RELEVANT CHRONIC CONDITIONS:   Nadine Eason is a 76year old female who presents for a Medicare Assessment.      PLAN SUMMARY:   Diagnoses and all orders for this visit:    Essential hypertension  -     COMP METABOLIC P hyperlipidemia type  Pt to increase exercise,  choose better fats,  increase fiber and avoid processed foods. Labs due.     5. Gastroesophageal reflux disease, esophagitis presence not specified  - sees Dr. Twin Rose     6.  Insomnia, unspecified type  - stab appetite been poor?: No  How does the patient maintain a good energy level?: Appropriate Exercise;Daily Walks  How would you describe your daily physical activity?: Moderate  How would you describe your current health state?: Good  How do you maintain posi applicable    Chlamydia  Annually if high risk No results found for: CHLAMYDIA No flowsheet data found. Screening Mammogram      Mammogram Annually to 76, then as discussed There are no preventive care reminders to display for this patient.  Update Healt

## 2020-10-06 NOTE — PATIENT INSTRUCTIONS
Yue Alonzo's SCREENING SCHEDULE   Tests on this list are recommended by your physician but may not be covered, or covered at this frequency, by your insurer. Please check with your insurance carrier before scheduling to verify coverage.    PRE IPPE only No results found for this or any previous visit.  Limited to patients who meet one of the following criteria:   • Men who are 73-68 years old and have smoked more than 100 cigarettes in their lifetime   • Anyone with a family history    Colorectal are no preventive care reminders to display for this patient. Please get this Mammogram regularly   Immunizations      Influenza  Covered Annually No orders found for this or any previous visit.  Please get every year    Pneumococcal 13 (Prevnar)  Covered [place])  [email or web address] org  This link also has information from the [address] [place] [address] regarding Advance Directives.

## 2020-10-07 ENCOUNTER — TELEPHONE (OUTPATIENT)
Dept: INTERNAL MEDICINE CLINIC | Facility: CLINIC | Age: 75
End: 2020-10-07

## 2020-10-14 ENCOUNTER — TELEPHONE (OUTPATIENT)
Dept: INTERNAL MEDICINE CLINIC | Facility: CLINIC | Age: 75
End: 2020-10-14

## 2020-10-29 ENCOUNTER — LAB ENCOUNTER (OUTPATIENT)
Dept: LAB | Age: 75
End: 2020-10-29
Attending: FAMILY MEDICINE
Payer: MEDICARE

## 2020-10-29 DIAGNOSIS — I10 ESSENTIAL HYPERTENSION: ICD-10-CM

## 2020-10-29 DIAGNOSIS — F41.1 GENERALIZED ANXIETY DISORDER: ICD-10-CM

## 2020-10-29 DIAGNOSIS — R73.01 IMPAIRED FASTING GLUCOSE: ICD-10-CM

## 2020-10-29 DIAGNOSIS — K21.00 GASTROESOPHAGEAL REFLUX DISEASE WITH ESOPHAGITIS WITHOUT HEMORRHAGE: ICD-10-CM

## 2020-10-29 PROCEDURE — 80053 COMPREHEN METABOLIC PANEL: CPT

## 2020-10-29 PROCEDURE — 80061 LIPID PANEL: CPT

## 2020-10-29 PROCEDURE — 85025 COMPLETE CBC W/AUTO DIFF WBC: CPT

## 2020-10-29 PROCEDURE — 36415 COLL VENOUS BLD VENIPUNCTURE: CPT

## 2020-10-29 PROCEDURE — 83036 HEMOGLOBIN GLYCOSYLATED A1C: CPT

## 2020-10-29 PROCEDURE — 84443 ASSAY THYROID STIM HORMONE: CPT

## 2020-10-30 ENCOUNTER — TELEPHONE (OUTPATIENT)
Dept: INTERNAL MEDICINE CLINIC | Facility: CLINIC | Age: 75
End: 2020-10-30

## 2020-10-30 DIAGNOSIS — E78.5 HYPERLIPIDEMIA, UNSPECIFIED HYPERLIPIDEMIA TYPE: Primary | ICD-10-CM

## 2020-10-30 NOTE — TELEPHONE ENCOUNTER
Spoke with patient discussed test results,   Glucose 107 and Hgba1c is better, pt to continue to work on her diet and exercise. Kidneys are stable. Chol,ldl and non Hdl remains up, Hdl good. Pt's levels remain up and have been up for years.  Pt needs chol m

## 2020-11-01 NOTE — TELEPHONE ENCOUNTER
Since she could not tolerate the statins lets try her on Zetia 10 mg daily #30 with 5 refills.  Recheck lipids in 6 months

## 2020-11-02 RX ORDER — EZETIMIBE 10 MG/1
10 TABLET ORAL NIGHTLY
Qty: 30 TABLET | Refills: 5 | Status: SHIPPED | OUTPATIENT
Start: 2020-11-02 | End: 2021-06-21

## 2020-11-11 ENCOUNTER — TELEPHONE (OUTPATIENT)
Dept: INTERNAL MEDICINE CLINIC | Facility: CLINIC | Age: 75
End: 2020-11-11

## 2020-11-24 ENCOUNTER — TELEPHONE (OUTPATIENT)
Dept: INTERNAL MEDICINE CLINIC | Facility: CLINIC | Age: 75
End: 2020-11-24

## 2020-12-02 RX ORDER — HYDROXYZINE HYDROCHLORIDE 25 MG/1
TABLET, FILM COATED ORAL
Qty: 90 TABLET | Refills: 0 | Status: SHIPPED | OUTPATIENT
Start: 2020-12-02 | End: 2021-02-20

## 2020-12-02 NOTE — TELEPHONE ENCOUNTER
LOV: 10/6/2020 with Corrin Mcburney, NP  RTC: 6 months  Last Relevant Labs: 10/29/2020   Filled: 9/28/2020  #90 with 0 refills    No future appointments.

## 2020-12-13 RX ORDER — BUPROPION HYDROCHLORIDE 150 MG/1
150 TABLET, EXTENDED RELEASE ORAL DAILY
Qty: 30 TABLET | Refills: 3 | Status: SHIPPED | OUTPATIENT
Start: 2020-12-13 | End: 2021-04-08

## 2020-12-22 DIAGNOSIS — I10 ESSENTIAL HYPERTENSION: ICD-10-CM

## 2020-12-22 RX ORDER — METOPROLOL TARTRATE 50 MG/1
TABLET, FILM COATED ORAL
Qty: 90 TABLET | Refills: 1 | Status: SHIPPED | OUTPATIENT
Start: 2020-12-22 | End: 2021-06-15

## 2020-12-23 DIAGNOSIS — G47.00 INSOMNIA, UNSPECIFIED TYPE: ICD-10-CM

## 2020-12-27 RX ORDER — TEMAZEPAM 30 MG/1
CAPSULE ORAL
Qty: 90 CAPSULE | Refills: 0 | Status: SHIPPED | OUTPATIENT
Start: 2020-12-27 | End: 2021-12-20

## 2021-02-20 RX ORDER — HYDROXYZINE HYDROCHLORIDE 25 MG/1
TABLET, FILM COATED ORAL
Qty: 90 TABLET | Refills: 0 | Status: SHIPPED | OUTPATIENT
Start: 2021-02-20 | End: 2021-04-19

## 2021-02-20 NOTE — TELEPHONE ENCOUNTER
LOV: 10/6/2020 with Janel Lopez NP  RTC: 6 months  Last Relevant Labs: 10/29/2020  Filled: 12/2/2020   #90 with 0 refills    No future appointments.

## 2021-03-12 DIAGNOSIS — Z23 NEED FOR VACCINATION: ICD-10-CM

## 2021-04-07 NOTE — TELEPHONE ENCOUNTER
Patient requesting refill on medication. Patient was upset because [address] [place] [address] stated they have been calling us for two weeks and sent a request and we received nothing, which I informed her.  Patient has no medication left because she thought she was gavino

## 2021-04-08 RX ORDER — BUPROPION HYDROCHLORIDE 150 MG/1
150 TABLET, EXTENDED RELEASE ORAL DAILY
Qty: 30 TABLET | Refills: 0 | Status: SHIPPED | OUTPATIENT
Start: 2021-04-08 | End: 2021-11-01

## 2021-04-08 NOTE — TELEPHONE ENCOUNTER
buPROPion HCl ER, SR, 150 MG Oral Tablet 12 Hr         Sig: Take 1 tablet (150 mg total) by mouth daily.     Disp:  30 tablet    Refills:  3    Start: 4/7/2021    Class: Normal    Last ordered: 3 months ago by HERB Weeks      Last OV:10/06/20  No p

## 2021-04-16 DIAGNOSIS — E78.5 HYPERLIPIDEMIA, UNSPECIFIED HYPERLIPIDEMIA TYPE: ICD-10-CM

## 2021-04-18 RX ORDER — EZETIMIBE 10 MG/1
TABLET ORAL
Qty: 30 TABLET | Refills: 0 | OUTPATIENT
Start: 2021-04-18

## 2021-04-19 RX ORDER — HYDROXYZINE HYDROCHLORIDE 25 MG/1
TABLET, FILM COATED ORAL
Qty: 90 TABLET | Refills: 0 | Status: SHIPPED | OUTPATIENT
Start: 2021-04-19 | End: 2021-07-30

## 2021-04-19 NOTE — TELEPHONE ENCOUNTER
Requesting HYDROXYZINE HCL 25 MG Oral Tab  LOV: 10/6/20  RTC: NA  Last Relevant Labs: 10/29/20  Filled: 2/20/21 #90 with 0 refills    No future appointments.

## 2021-04-27 ENCOUNTER — OFFICE VISIT (OUTPATIENT)
Dept: INTERNAL MEDICINE CLINIC | Facility: CLINIC | Age: 76
End: 2021-04-27
Payer: MEDICARE

## 2021-04-27 ENCOUNTER — HOSPITAL ENCOUNTER (OUTPATIENT)
Dept: GENERAL RADIOLOGY | Age: 76
Discharge: HOME | End: 2021-04-27
Attending: FAMILY MEDICINE
Payer: MEDICARE

## 2021-04-27 VITALS
DIASTOLIC BLOOD PRESSURE: 70 MMHG | RESPIRATION RATE: 16 BRPM | SYSTOLIC BLOOD PRESSURE: 135 MMHG | HEIGHT: 62 IN | TEMPERATURE: 100 F | OXYGEN SATURATION: 99 % | BODY MASS INDEX: 30.27 KG/M2 | HEART RATE: 63 BPM | WEIGHT: 164.5 LBS

## 2021-04-27 DIAGNOSIS — M25.551 ACUTE HIP PAIN, BILATERAL: ICD-10-CM

## 2021-04-27 DIAGNOSIS — M25.551 ACUTE HIP PAIN, BILATERAL: Primary | ICD-10-CM

## 2021-04-27 DIAGNOSIS — M25.552 ACUTE HIP PAIN, BILATERAL: ICD-10-CM

## 2021-04-27 DIAGNOSIS — M25.552 ACUTE HIP PAIN, BILATERAL: Primary | ICD-10-CM

## 2021-04-27 PROBLEM — N18.30 CKD (CHRONIC KIDNEY DISEASE) STAGE 3, GFR 30-59 ML/MIN (HCC): Chronic | Status: ACTIVE | Noted: 2021-04-27

## 2021-04-27 PROCEDURE — 73522 X-RAY EXAM HIPS BI 3-4 VIEWS: CPT | Performed by: FAMILY MEDICINE

## 2021-04-27 PROCEDURE — 3008F BODY MASS INDEX DOCD: CPT | Performed by: FAMILY MEDICINE

## 2021-04-27 PROCEDURE — 99213 OFFICE O/P EST LOW 20 MIN: CPT | Performed by: FAMILY MEDICINE

## 2021-04-27 PROCEDURE — 3075F SYST BP GE 130 - 139MM HG: CPT | Performed by: FAMILY MEDICINE

## 2021-04-27 PROCEDURE — 3078F DIAST BP <80 MM HG: CPT | Performed by: FAMILY MEDICINE

## 2021-04-27 NOTE — PROGRESS NOTES
CHIEF COMPLAINT:     Patient presents with:  Pelvic Pain: Hip joint pain-For the past month on and off. Pain range 7/10       HPI:   Bolivar Michel is a 76year old female . The patient has complaints of bilateral hip pain.  Pain started 1 month ag Ergocalciferol (VITAMIN D OR) Take 3,000 Units by mouth daily. • ZINC ACETATE OR Take 1 tablet by mouth daily.        • CALCIUM ACETATE OR         Past Medical History:   Diagnosis Date   • Chest pain     admitt cp normal w/u   • Depression    • Essen Exam    ASSESSMENT AND PLAN:     Acute hip pain, bilateral  (primary encounter diagnosis)    No orders of the defined types were placed in this encounter.       Meds & Refills for this Visit:  Requested Prescriptions      No prescriptions requested or order

## 2021-04-28 ENCOUNTER — TELEPHONE (OUTPATIENT)
Dept: INTERNAL MEDICINE CLINIC | Facility: CLINIC | Age: 76
End: 2021-04-28

## 2021-04-28 DIAGNOSIS — N94.9 PAIN OF FEMALE SYMPHYSIS PUBIS: ICD-10-CM

## 2021-04-28 DIAGNOSIS — R93.7 ABNORMAL X-RAY OF PELVIS: Primary | ICD-10-CM

## 2021-04-28 NOTE — TELEPHONE ENCOUNTER
Patient stated that she received her hip x ray results and was told that she might need a CT scan. Patient would like to speak with a nurse to see what her next step will be. Please advise.

## 2021-05-07 ENCOUNTER — HOSPITAL ENCOUNTER (OUTPATIENT)
Dept: CT IMAGING | Age: 76
Discharge: HOME | End: 2021-05-07
Attending: FAMILY MEDICINE
Payer: MEDICARE

## 2021-05-07 DIAGNOSIS — N94.9 PAIN OF FEMALE SYMPHYSIS PUBIS: ICD-10-CM

## 2021-05-07 DIAGNOSIS — R93.7 ABNORMAL X-RAY OF PELVIS: ICD-10-CM

## 2021-05-07 PROCEDURE — 72192 CT PELVIS W/O DYE: CPT | Performed by: FAMILY MEDICINE

## 2021-05-13 ENCOUNTER — TELEPHONE (OUTPATIENT)
Dept: INTERNAL MEDICINE CLINIC | Facility: CLINIC | Age: 76
End: 2021-05-13

## 2021-05-16 ENCOUNTER — HOSPITAL ENCOUNTER (OUTPATIENT)
Age: 76
Discharge: HOME | End: 2021-05-16
Attending: EMERGENCY MEDICINE
Payer: MEDICARE

## 2021-05-16 VITALS
SYSTOLIC BLOOD PRESSURE: 143 MMHG | TEMPERATURE: 98 F | DIASTOLIC BLOOD PRESSURE: 84 MMHG | RESPIRATION RATE: 18 BRPM | HEART RATE: 64 BPM | OXYGEN SATURATION: 98 %

## 2021-05-16 DIAGNOSIS — R53.83 FATIGUE, UNSPECIFIED TYPE: Primary | ICD-10-CM

## 2021-05-16 PROCEDURE — 80047 BASIC METABLC PNL IONIZED CA: CPT

## 2021-05-16 PROCEDURE — 99213 OFFICE O/P EST LOW 20 MIN: CPT

## 2021-05-16 PROCEDURE — 36415 COLL VENOUS BLD VENIPUNCTURE: CPT

## 2021-05-16 PROCEDURE — 85025 COMPLETE CBC W/AUTO DIFF WBC: CPT | Performed by: EMERGENCY MEDICINE

## 2021-05-16 PROCEDURE — 81002 URINALYSIS NONAUTO W/O SCOPE: CPT | Performed by: EMERGENCY MEDICINE

## 2021-05-16 NOTE — ED PROVIDER NOTES
Patient Seen in: [facility]      History   Patient presents with:  Fever  Fatigue    Stated Complaint: LOW GRADE FEVER/FATIGUE X 2 WKS    HPI/Subjective:   HPI    77-year-old female complaint of generalized fatigue the patient's had low-g systems are as noted in HPI. Constitutional and vital signs reviewed. All other systems reviewed and negative except as noted above.     Physical Exam     ED Triage Vitals [05/16/21 1529]   /84   Pulse 64   Resp 18   Temp 98 °F (36.7 °C)   Temp diagnosis)     Disposition:  Discharge  5/16/2021  4:22 pm    Follow-up:  Osvaldo Ramsay MD  [address]  [phone]    In 3 days            Medications Prescribed:  Current Discharge Medication List

## 2021-06-08 ENCOUNTER — TELEPHONE (OUTPATIENT)
Dept: INTERNAL MEDICINE CLINIC | Facility: CLINIC | Age: 76
End: 2021-06-08

## 2021-06-08 DIAGNOSIS — R73.01 IMPAIRED FASTING GLUCOSE: Primary | ICD-10-CM

## 2021-06-08 DIAGNOSIS — E78.5 HYPERLIPIDEMIA, UNSPECIFIED HYPERLIPIDEMIA TYPE: ICD-10-CM

## 2021-06-08 DIAGNOSIS — I10 ESSENTIAL HYPERTENSION: ICD-10-CM

## 2021-06-08 NOTE — TELEPHONE ENCOUNTER
Patient stated she received an email from [employer] that she should get basic heart health screening tests done and she would like to know how she can go about that through THE [facility]? Patient stated if she does not answer, please leave a detailed message.

## 2021-06-08 NOTE — TELEPHONE ENCOUNTER
Spoke with patient stating received email from [facility] for basic heart health screening tests, she states does not state what tests to complete and is asking SM if there is any labs she needs to complete at this time? Last labs completed on 10/29/2020.

## 2021-06-10 NOTE — TELEPHONE ENCOUNTER
Spoke with patient stating she went on [employer], where it indicated she should complete these tests for higher risk of heart disease:    Hs.CRP-this test is supposed to be done twice 2 weeks apart     Plasma Ceramides      High Sensitive TroponinT.

## 2021-06-10 NOTE — TELEPHONE ENCOUNTER
Patient called in requesting to have additional labs ordered. Hs.CRP test supposed to be done twice 2 weeks apart    Plasma Ceramides       High Sensitive TroponinT.

## 2021-06-15 DIAGNOSIS — I10 ESSENTIAL HYPERTENSION: ICD-10-CM

## 2021-06-15 RX ORDER — METOPROLOL TARTRATE 50 MG/1
TABLET, FILM COATED ORAL
Qty: 90 TABLET | Refills: 0 | Status: SHIPPED | OUTPATIENT
Start: 2021-06-15 | End: 2021-09-27

## 2021-06-15 NOTE — TELEPHONE ENCOUNTER
Name from pharmacy: Metoprolol Tartrate 50 Mg Tab Risi         Will file in chart as: METOPROLOL TARTRATE 50 MG Oral Tab    Sig: TAKE ONE TABLET BY MOUTH DAILY    Disp:  90 tablet    Refills:  0    Start: 6/15/2021    Class: Normal    Non-formulary For: E

## 2021-06-16 ENCOUNTER — LAB ENCOUNTER (OUTPATIENT)
Dept: LAB | Age: 76
End: 2021-06-16
Attending: FAMILY MEDICINE
Payer: MEDICARE

## 2021-06-16 DIAGNOSIS — R73.01 IMPAIRED FASTING GLUCOSE: ICD-10-CM

## 2021-06-16 DIAGNOSIS — E78.5 HYPERLIPIDEMIA, UNSPECIFIED HYPERLIPIDEMIA TYPE: ICD-10-CM

## 2021-06-16 DIAGNOSIS — I10 ESSENTIAL HYPERTENSION: ICD-10-CM

## 2021-06-16 PROCEDURE — 36415 COLL VENOUS BLD VENIPUNCTURE: CPT

## 2021-06-16 PROCEDURE — 80061 LIPID PANEL: CPT

## 2021-06-16 PROCEDURE — 80053 COMPREHEN METABOLIC PANEL: CPT

## 2021-06-16 PROCEDURE — 83036 HEMOGLOBIN GLYCOSYLATED A1C: CPT

## 2021-06-21 DIAGNOSIS — E78.5 HYPERLIPIDEMIA, UNSPECIFIED HYPERLIPIDEMIA TYPE: ICD-10-CM

## 2021-06-21 RX ORDER — EZETIMIBE 10 MG/1
10 TABLET ORAL NIGHTLY
Qty: 30 TABLET | Refills: 5 | Status: SHIPPED | OUTPATIENT
Start: 2021-06-21 | End: 2021-12-05

## 2021-06-22 ENCOUNTER — TELEPHONE (OUTPATIENT)
Dept: CASE MANAGEMENT | Age: 76
End: 2021-06-22

## 2021-06-24 ENCOUNTER — TELEPHONE (OUTPATIENT)
Dept: INTERNAL MEDICINE CLINIC | Facility: CLINIC | Age: 76
End: 2021-06-24

## 2021-06-24 ENCOUNTER — OFFICE VISIT (OUTPATIENT)
Dept: INTERNAL MEDICINE CLINIC | Facility: CLINIC | Age: 76
End: 2021-06-24
Payer: MEDICARE

## 2021-06-24 ENCOUNTER — LAB ENCOUNTER (OUTPATIENT)
Dept: LAB | Age: 76
End: 2021-06-24
Attending: FAMILY MEDICINE
Payer: MEDICARE

## 2021-06-24 VITALS
BODY MASS INDEX: 28.71 KG/M2 | TEMPERATURE: 99 F | HEART RATE: 68 BPM | WEIGHT: 156 LBS | SYSTOLIC BLOOD PRESSURE: 126 MMHG | DIASTOLIC BLOOD PRESSURE: 68 MMHG | HEIGHT: 62 IN | RESPIRATION RATE: 16 BRPM

## 2021-06-24 DIAGNOSIS — R53.83 OTHER FATIGUE: ICD-10-CM

## 2021-06-24 DIAGNOSIS — R53.83 OTHER FATIGUE: Primary | ICD-10-CM

## 2021-06-24 DIAGNOSIS — M25.512 ACUTE PAIN OF LEFT SHOULDER: ICD-10-CM

## 2021-06-24 DIAGNOSIS — R50.9 LOW GRADE FEVER: ICD-10-CM

## 2021-06-24 PROCEDURE — 86664 EPSTEIN-BARR NUCLEAR ANTIGEN: CPT

## 2021-06-24 PROCEDURE — 80053 COMPREHEN METABOLIC PANEL: CPT

## 2021-06-24 PROCEDURE — 3074F SYST BP LT 130 MM HG: CPT | Performed by: FAMILY MEDICINE

## 2021-06-24 PROCEDURE — 82306 VITAMIN D 25 HYDROXY: CPT

## 2021-06-24 PROCEDURE — 3008F BODY MASS INDEX DOCD: CPT | Performed by: FAMILY MEDICINE

## 2021-06-24 PROCEDURE — 3078F DIAST BP <80 MM HG: CPT | Performed by: FAMILY MEDICINE

## 2021-06-24 PROCEDURE — 84443 ASSAY THYROID STIM HORMONE: CPT

## 2021-06-24 PROCEDURE — 82607 VITAMIN B-12: CPT

## 2021-06-24 PROCEDURE — 84439 ASSAY OF FREE THYROXINE: CPT

## 2021-06-24 PROCEDURE — 86665 EPSTEIN-BARR CAPSID VCA: CPT

## 2021-06-24 PROCEDURE — 82728 ASSAY OF FERRITIN: CPT

## 2021-06-24 PROCEDURE — 99214 OFFICE O/P EST MOD 30 MIN: CPT | Performed by: FAMILY MEDICINE

## 2021-06-24 PROCEDURE — 36415 COLL VENOUS BLD VENIPUNCTURE: CPT

## 2021-06-24 PROCEDURE — 83735 ASSAY OF MAGNESIUM: CPT

## 2021-06-24 PROCEDURE — 85025 COMPLETE CBC W/AUTO DIFF WBC: CPT

## 2021-06-24 NOTE — PROGRESS NOTES
CHIEF COMPLAINT:     Patient presents with:  Fatigue: has been going on for a couple months possibly longer.  Pt went to [facility] and did lab work and everything came out negative but they did say it possibly can be virual. Has not got better since, comes and goe Oral Tablet 12 Hr Take 1 tablet (150 mg total) by mouth daily. APPT DUE NOW 30 tablet 0   • TEMAZEPAM 30 MG Oral Cap take 1 capsule by mouth at bedtime as needed for sleep.  90 capsule 0   • Estradiol 0.1 MG/GM Vaginal Cream      • Omega-3 Fatty Acids (FISH intact, PERRLA  EARS: TM's clear, no bulging, no retraction, no fluid  NOSE: nostrils patent, no exudates, nasal mucosa pink and noninflamed  THROAT: oral mucosa pink, moist. No visible dental caries. Posterior pharynx is no erythematous. no exudates.   NEC

## 2021-06-24 NOTE — TELEPHONE ENCOUNTER
Pt brought in card for first dose of the COVID dose but lost the 2nd documentation. Asked to call [facility]. Called [facility] on [address], they said she only got 1 dose.      Called pt and let her know that pharmacy only verified one dose, she

## 2021-07-30 RX ORDER — HYDROXYZINE HYDROCHLORIDE 25 MG/1
TABLET, FILM COATED ORAL
Qty: 90 TABLET | Refills: 0 | Status: SHIPPED | OUTPATIENT
Start: 2021-07-30 | End: 2021-10-19

## 2021-08-02 ENCOUNTER — OFFICE VISIT (OUTPATIENT)
Dept: INTERNAL MEDICINE CLINIC | Facility: CLINIC | Age: 76
End: 2021-08-02
Payer: MEDICARE

## 2021-08-02 ENCOUNTER — HOSPITAL ENCOUNTER (OUTPATIENT)
Dept: GENERAL RADIOLOGY | Age: 76
Discharge: HOME | End: 2021-08-02
Attending: FAMILY MEDICINE
Payer: MEDICARE

## 2021-08-02 VITALS
WEIGHT: 153.5 LBS | OXYGEN SATURATION: 98 % | SYSTOLIC BLOOD PRESSURE: 130 MMHG | DIASTOLIC BLOOD PRESSURE: 78 MMHG | RESPIRATION RATE: 16 BRPM | TEMPERATURE: 99 F | HEIGHT: 62 IN | BODY MASS INDEX: 28.25 KG/M2 | HEART RATE: 55 BPM

## 2021-08-02 DIAGNOSIS — M25.512 LEFT SHOULDER PAIN, UNSPECIFIED CHRONICITY: Primary | ICD-10-CM

## 2021-08-02 DIAGNOSIS — M25.512 LEFT SHOULDER PAIN, UNSPECIFIED CHRONICITY: ICD-10-CM

## 2021-08-02 PROCEDURE — 73030 X-RAY EXAM OF SHOULDER: CPT | Performed by: FAMILY MEDICINE

## 2021-08-02 PROCEDURE — 3075F SYST BP GE 130 - 139MM HG: CPT | Performed by: FAMILY MEDICINE

## 2021-08-02 PROCEDURE — 99213 OFFICE O/P EST LOW 20 MIN: CPT | Performed by: FAMILY MEDICINE

## 2021-08-02 PROCEDURE — 3008F BODY MASS INDEX DOCD: CPT | Performed by: FAMILY MEDICINE

## 2021-08-02 PROCEDURE — 3078F DIAST BP <80 MM HG: CPT | Performed by: FAMILY MEDICINE

## 2021-08-02 NOTE — PROGRESS NOTES
Patient presents with:  Shoulder Pain: Left shoulder pain, pain radiating to her left deltoid; Pain range 8/10 with motion     HPI:     Pina Curiel is a 68year old female who presents today with a chief complaint of  Left shoulder pain.   Cause (VITAMIN B-12 OR) Take by mouth. • Multiple Vitamin Oral Tab Take 1 tablet by mouth daily. • Ergocalciferol (VITAMIN D OR) Take 3,000 Units by mouth daily.           Past Medical History:   Diagnosis Date   • Chest pain     admitt cp normal w/u   • in this encounter.       Meds & Refills for this Visit:  Requested Prescriptions      No prescriptions requested or ordered in this encounter       Imaging & Consults:  None      Left shoulder  X-ray -->  Await results Pt may need PT  Rest, ice, heat, eleva

## 2021-08-05 ENCOUNTER — TELEPHONE (OUTPATIENT)
Dept: INTERNAL MEDICINE CLINIC | Facility: CLINIC | Age: 76
End: 2021-08-05

## 2021-08-06 ENCOUNTER — TELEPHONE (OUTPATIENT)
Dept: INTERNAL MEDICINE CLINIC | Facility: CLINIC | Age: 76
End: 2021-08-06

## 2021-08-06 DIAGNOSIS — M25.512 LEFT SHOULDER PAIN, UNSPECIFIED CHRONICITY: Primary | ICD-10-CM

## 2021-08-06 NOTE — TELEPHONE ENCOUNTER
Incoming fax from [facility]    Radiology report from 8/5/2021     Mammogram Screening     No evidence of malignancy      Recommends routine mammogram in 1 year     Epic updated      Placed in [id number] [facility] for review

## 2021-08-06 NOTE — TELEPHONE ENCOUNTER
Alex Leonard, APRN   8/2/2021  5:55 PM CDT       Mild osteoarthritic changes of the left shoulder.  Pt needs PT.

## 2021-08-06 NOTE — TELEPHONE ENCOUNTER
Spoke with pt requesting PT order be placed to [facility], referral pended for your review and approval if appropriate. Please advise.        Lenin [address] [place]  [phone]

## 2021-09-25 DIAGNOSIS — I10 ESSENTIAL HYPERTENSION: ICD-10-CM

## 2021-09-27 ENCOUNTER — TELEPHONE (OUTPATIENT)
Dept: INTERNAL MEDICINE CLINIC | Facility: CLINIC | Age: 76
End: 2021-09-27

## 2021-09-27 RX ORDER — METOPROLOL TARTRATE 50 MG/1
TABLET, FILM COATED ORAL
Qty: 90 TABLET | Refills: 0 | Status: SHIPPED | OUTPATIENT
Start: 2021-09-27 | End: 2021-12-26

## 2021-09-27 NOTE — TELEPHONE ENCOUNTER
Name from pharmacy: Metoprolol Tartrate 50 Mg Tab Auro         Will file in chart as: METOPROLOL TARTRATE 50 MG Oral Tab    Sig: TAKE ONE TABLET BY MOUTH DAILY    Disp:  90 tablet    Refills:  0    Start: 9/25/2021    Class: Normal    Non-formulary For: E

## 2021-09-27 NOTE — TELEPHONE ENCOUNTER
Paperwork given to MA by Rudy Lozano to fax over to [facility] with confirmation    Placed in accordion folder as well as sent to scan

## 2021-10-07 ENCOUNTER — TELEPHONE (OUTPATIENT)
Dept: INTERNAL MEDICINE CLINIC | Facility: CLINIC | Age: 76
End: 2021-10-07

## 2021-10-07 NOTE — TELEPHONE ENCOUNTER
Incoming fax from [facility]    Medicare Progress notes     Visit on 10/5/2021     Placed in SM inbox for review and signature

## 2021-10-14 ENCOUNTER — TELEPHONE (OUTPATIENT)
Dept: INTERNAL MEDICINE CLINIC | Facility: CLINIC | Age: 76
End: 2021-10-14

## 2021-10-18 ENCOUNTER — OFFICE VISIT (OUTPATIENT)
Dept: INTERNAL MEDICINE CLINIC | Facility: CLINIC | Age: 76
End: 2021-10-18
Payer: MEDICARE

## 2021-10-18 VITALS
TEMPERATURE: 99 F | OXYGEN SATURATION: 98 % | BODY MASS INDEX: 27.42 KG/M2 | HEART RATE: 77 BPM | DIASTOLIC BLOOD PRESSURE: 62 MMHG | HEIGHT: 62 IN | SYSTOLIC BLOOD PRESSURE: 124 MMHG | WEIGHT: 149 LBS | RESPIRATION RATE: 20 BRPM

## 2021-10-18 DIAGNOSIS — D23.9 DERMATOFIBROMA: ICD-10-CM

## 2021-10-18 DIAGNOSIS — Z00.00 ENCOUNTER FOR ANNUAL HEALTH EXAMINATION: Primary | ICD-10-CM

## 2021-10-18 DIAGNOSIS — R00.0 TACHYCARDIA: ICD-10-CM

## 2021-10-18 DIAGNOSIS — F41.1 GENERALIZED ANXIETY DISORDER: ICD-10-CM

## 2021-10-18 DIAGNOSIS — I27.20 PULMONARY HYPERTENSION (HCC): ICD-10-CM

## 2021-10-18 DIAGNOSIS — G43.009 MIGRAINE WITHOUT AURA AND WITHOUT STATUS MIGRAINOSUS, NOT INTRACTABLE: ICD-10-CM

## 2021-10-18 DIAGNOSIS — J30.9 ALLERGIC RHINITIS, UNSPECIFIED SEASONALITY, UNSPECIFIED TRIGGER: ICD-10-CM

## 2021-10-18 DIAGNOSIS — K57.30 DIVERTICULOSIS OF COLON: ICD-10-CM

## 2021-10-18 DIAGNOSIS — K21.00 GASTROESOPHAGEAL REFLUX DISEASE WITH ESOPHAGITIS WITHOUT HEMORRHAGE: ICD-10-CM

## 2021-10-18 DIAGNOSIS — M25.512 CHRONIC LEFT SHOULDER PAIN: ICD-10-CM

## 2021-10-18 DIAGNOSIS — F33.0 MILD EPISODE OF RECURRENT MAJOR DEPRESSIVE DISORDER (HCC): ICD-10-CM

## 2021-10-18 DIAGNOSIS — G47.00 INSOMNIA, UNSPECIFIED TYPE: ICD-10-CM

## 2021-10-18 DIAGNOSIS — L50.1 IDIOPATHIC URTICARIA: ICD-10-CM

## 2021-10-18 DIAGNOSIS — E78.5 HYPERLIPIDEMIA, UNSPECIFIED HYPERLIPIDEMIA TYPE: ICD-10-CM

## 2021-10-18 DIAGNOSIS — K44.9 DIAPHRAGMATIC HERNIA WITHOUT OBSTRUCTION AND WITHOUT GANGRENE: ICD-10-CM

## 2021-10-18 DIAGNOSIS — R73.01 IMPAIRED FASTING GLUCOSE: ICD-10-CM

## 2021-10-18 DIAGNOSIS — H25.9 AGE-RELATED CATARACT OF BOTH EYES, UNSPECIFIED AGE-RELATED CATARACT TYPE: ICD-10-CM

## 2021-10-18 DIAGNOSIS — M81.0 AGE-RELATED OSTEOPOROSIS WITHOUT CURRENT PATHOLOGICAL FRACTURE: ICD-10-CM

## 2021-10-18 DIAGNOSIS — I70.0 ATHEROSCLEROSIS OF ABDOMINAL AORTA (HCC): ICD-10-CM

## 2021-10-18 DIAGNOSIS — N18.31 STAGE 3A CHRONIC KIDNEY DISEASE (HCC): ICD-10-CM

## 2021-10-18 DIAGNOSIS — R01.1 MURMUR: ICD-10-CM

## 2021-10-18 DIAGNOSIS — N95.2 POSTMENOPAUSAL ATROPHIC VAGINITIS: ICD-10-CM

## 2021-10-18 DIAGNOSIS — G89.29 CHRONIC LEFT SHOULDER PAIN: ICD-10-CM

## 2021-10-18 DIAGNOSIS — I10 ESSENTIAL HYPERTENSION: ICD-10-CM

## 2021-10-18 PROCEDURE — 3074F SYST BP LT 130 MM HG: CPT | Performed by: FAMILY MEDICINE

## 2021-10-18 PROCEDURE — 3008F BODY MASS INDEX DOCD: CPT | Performed by: FAMILY MEDICINE

## 2021-10-18 PROCEDURE — 99397 PER PM REEVAL EST PAT 65+ YR: CPT | Performed by: FAMILY MEDICINE

## 2021-10-18 PROCEDURE — 3078F DIAST BP <80 MM HG: CPT | Performed by: FAMILY MEDICINE

## 2021-10-18 PROCEDURE — G0439 PPPS, SUBSEQ VISIT: HCPCS | Performed by: FAMILY MEDICINE

## 2021-10-18 PROCEDURE — 96160 PT-FOCUSED HLTH RISK ASSMT: CPT | Performed by: FAMILY MEDICINE

## 2021-10-18 NOTE — PATIENT INSTRUCTIONS
Yue Alonzo's SCREENING SCHEDULE   Tests on this list are recommended by your physician but may not be covered, or covered at this frequency, by your insurer. Please check with your insurance carrier before scheduling to verify coverage.    P previous visit. No recommendations at this time   Pap and Pelvic    Pap   Covered every 2 years for women at normal risk;  Annually if at high risk -  No recommendations at this time    Chlamydia Annually if high risk -  No recommendations at this time regarding Advance Directives. Yue Alonzo's SCREENING SCHEDULE   Tests on this list are recommended by your physician but may not be covered, or covered at this frequency, by your insurer.    Please check with your insurance carrier before sched results found for this or any previous visit. No recommendations at this time   Pap and Pelvic    Pap   Covered every 2 years for women at normal risk;  Annually if at high risk -  No recommendations at this time    Chlamydia Annually if high risk -  [facility] regarding Advance Directives.

## 2021-10-18 NOTE — PROGRESS NOTES
HPI:   Pina Curiel is a 68year old female who presents for a MA (Medicare Advantage) Supervisit (Once per calendar year). Patient still having issue with her left shoulder. Pt  Is still in Pt and is having pain and ROM issues.  Pt's PT sugges Practice)  Jeff Villasenor MD (GASTROENTEROLOGY)    Patient Active Problem List:     Hyperlipemia     Osteoporosis     GERD (gastroesophageal reflux disease)     Hypertension     Tachycardia     Insomnia     Osteoarthrosis involving lower leg     Idiopat daily. APPT DUE NOW  TEMAZEPAM 30 MG Oral Cap, take 1 capsule by mouth at bedtime as needed for sleep. Estradiol 0.1 MG/GM Vaginal Cream,   Omega-3 Fatty Acids (FISH OIL OR), Take 1-2 capsules by mouth daily.   Probiotic Oral Cap, Take 1 capsule by mouth d anxiety  HEMATOLOGIC: denies hx of anemia  ENDOCRINE: denies thyroid history  ALL/ASTHMA: + allergy     EXAM:   /62   Pulse 77   Temp 98.8 °F (37.1 °C) (Oral)   Resp 20   Ht 5' 2\" (1.575 m)   Wt 149 lb (67.6 kg)   SpO2 98%   BMI 27.25 kg/m²  Estimat Normal  SHOULDER: Left- + pain in the joint, minimal swelling, decreased ROM       Vaccination History     Immunization History   Administered Date(s) Administered   • Covid-19 Vaccine Moderna 100 mcg/0.5 ml 03/04/2021, 04/01/2021   • FLU VAC High Dose 65 hemorrhage    Tachycardia    Diverticulosis of colon    Age-related cataract of both eyes, unspecified age-related cataract type    Postmenopausal atrophic vaginitis    Dermatofibroma    Allergic rhinitis, unspecified seasonality, unspecified trigger    Mi as needed    13. Generalized anxiety disorder  Managed by Dr. Cristela Pittman    14. Gastroesophageal reflux disease with esophagitis without hemorrhage  -managed by Dr. Trena Miller    15. Tachycardia  - stable, on Metoprolol    16.  Diverticulosis of colon  -managed by Diabetes Screening    Fasting Blood Sugar /  Glucose    One screening every 12 months if never tested or if previously tested but not diagnosed with pre-diabetes   One screening every 6 months if diagnosed with pre-diabetes Lab Results   Component Value patients aged 36 and older    One baseline mammogram covered for patients aged 32-38 08/05/2021    Mammogram due on 08/05/2022    Immunizations    Influenza Covered once per flu season  Please get every year -  Influenza Vaccine(1) due on 10/01/2021    Pne

## 2021-10-19 RX ORDER — HYDROXYZINE HYDROCHLORIDE 25 MG/1
TABLET, FILM COATED ORAL
Qty: 90 TABLET | Refills: 0 | Status: SHIPPED | OUTPATIENT
Start: 2021-10-19 | End: 2022-01-01

## 2021-10-19 NOTE — TELEPHONE ENCOUNTER
Name from pharmacy: Hydroxyzine Hydrochloride 25 Mg Tab Nort          Will file in chart as: HYDROXYZINE 25 MG Oral Tab    Sig: TAKE ONE TABLET BY MOUTH THREE TIMES DAILY AS NEEDED FOR ITCHING     Original sig: TAKE ONE TABLET BY MOUTH THREE TIMES DAILY AS

## 2021-10-29 NOTE — TELEPHONE ENCOUNTER
Pt requesting gera to fill wellbutrin rx - pt psychiatrist is retiring. Pt is unsure if she will be getting a new psychiatrist. Pt stated she takes a low dose.       Confirmed [address], [facility] [address] [phone], [phone]    Pt would like a call back once gera has decided

## 2021-10-30 NOTE — TELEPHONE ENCOUNTER
I do not have an issue taking this medication over but lets check on her dose is she on 150 Wellbutrin XR which is once a day or Wellbutrin  which is suppose to be twice a day.

## 2021-11-01 RX ORDER — BUPROPION HYDROCHLORIDE 150 MG/1
150 TABLET, EXTENDED RELEASE ORAL DAILY
Qty: 30 TABLET | Refills: 3 | Status: SHIPPED | OUTPATIENT
Start: 2021-11-01 | End: 2022-05-17

## 2021-11-01 NOTE — TELEPHONE ENCOUNTER
Pt returning nurse call.  Pt stated that she takes Bupropion ER 12 150mg (generic wellbutrin)     Confirmed pharmacy    [address], [address], [facility] [address] [phone], [phone]

## 2021-11-04 ENCOUNTER — TELEPHONE (OUTPATIENT)
Dept: INTERNAL MEDICINE CLINIC | Facility: CLINIC | Age: 76
End: 2021-11-04

## 2021-11-06 ENCOUNTER — HOSPITAL ENCOUNTER (OUTPATIENT)
Dept: MRI IMAGING | Age: 76
Discharge: HOME | End: 2021-11-06
Attending: FAMILY MEDICINE
Payer: MEDICARE

## 2021-11-06 DIAGNOSIS — G89.29 CHRONIC LEFT SHOULDER PAIN: ICD-10-CM

## 2021-11-06 DIAGNOSIS — M25.512 CHRONIC LEFT SHOULDER PAIN: ICD-10-CM

## 2021-11-06 PROCEDURE — 73221 MRI JOINT UPR EXTREM W/O DYE: CPT | Performed by: FAMILY MEDICINE

## 2021-12-05 DIAGNOSIS — E78.5 HYPERLIPIDEMIA, UNSPECIFIED HYPERLIPIDEMIA TYPE: ICD-10-CM

## 2021-12-05 RX ORDER — EZETIMIBE 10 MG/1
10 TABLET ORAL NIGHTLY
Qty: 30 TABLET | Refills: 0 | Status: SHIPPED | OUTPATIENT
Start: 2021-12-05 | End: 2022-01-17

## 2021-12-08 ENCOUNTER — OFFICE VISIT (OUTPATIENT)
Dept: ORTHOPEDICS CLINIC | Facility: CLINIC | Age: 76
End: 2021-12-08
Payer: MEDICARE

## 2021-12-08 VITALS — BODY MASS INDEX: 26.68 KG/M2 | WEIGHT: 145 LBS | HEIGHT: 62 IN

## 2021-12-08 DIAGNOSIS — M19.012 PRIMARY OSTEOARTHRITIS OF LEFT SHOULDER: ICD-10-CM

## 2021-12-08 DIAGNOSIS — M75.42 IMPINGEMENT SYNDROME OF LEFT SHOULDER: Primary | ICD-10-CM

## 2021-12-08 PROCEDURE — 99203 OFFICE O/P NEW LOW 30 MIN: CPT | Performed by: ORTHOPAEDIC SURGERY

## 2021-12-08 PROCEDURE — 20610 DRAIN/INJ JOINT/BURSA W/O US: CPT | Performed by: ORTHOPAEDIC SURGERY

## 2021-12-08 PROCEDURE — 3008F BODY MASS INDEX DOCD: CPT | Performed by: ORTHOPAEDIC SURGERY

## 2021-12-08 RX ORDER — TRIAMCINOLONE ACETONIDE 40 MG/ML
40 INJECTION, SUSPENSION INTRA-ARTICULAR; INTRAMUSCULAR ONCE
Status: COMPLETED | OUTPATIENT
Start: 2021-12-08 | End: 2021-12-08

## 2021-12-08 RX ADMIN — TRIAMCINOLONE ACETONIDE 40 MG: 40 INJECTION, SUSPENSION INTRA-ARTICULAR; INTRAMUSCULAR at 14:52:00

## 2021-12-08 NOTE — PROGRESS NOTES
[facility] New Consult    CC: Patient presents with:  Shoulder Pain: Pt is here for left shoulder pain for the last 4 months.  States reaching motions are hardest      HPI: The patient is a 68year old female referred for orthopaedic consultatio mouth daily.  APPT DUE NOW 30 tablet 3   • HYDROXYZINE 25 MG Oral Tab TAKE ONE TABLET BY MOUTH THREE TIMES DAILY AS NEEDED FOR ITCHING 90 tablet 0   • METOPROLOL TARTRATE 50 MG Oral Tab TAKE ONE TABLET BY MOUTH DAILY 90 tablet 0   • TEMAZEPAM 30 MG Oral Cap 150 on the right. Abduction is limited to 90 after which pain is reported. Extra rotation is fairly comparable at 55 degrees bilaterally with the arms at her side.   She can internally rotate to the belt line with a normal liftoff test.  Rotator cuff sergio the patient gave written and verbal consent to proceed. Using meticulous sterile technique, I injected 40 mg of Kenalog mixed with 2 cc of 1% Xylocaine and 2 cc of 0.25% Marcaine at the posterior portal site of the left shoulder to minimal resistance.   Hansel Jason

## 2021-12-19 DIAGNOSIS — G47.00 INSOMNIA, UNSPECIFIED TYPE: ICD-10-CM

## 2021-12-20 RX ORDER — TEMAZEPAM 30 MG/1
CAPSULE ORAL
Qty: 90 CAPSULE | Refills: 0 | Status: SHIPPED | OUTPATIENT
Start: 2021-12-20

## 2021-12-24 ENCOUNTER — APPOINTMENT (OUTPATIENT)
Dept: CT IMAGING | Age: 76
End: 2021-12-24
Attending: EMERGENCY MEDICINE
Payer: MEDICARE

## 2021-12-24 ENCOUNTER — HOSPITAL ENCOUNTER (OUTPATIENT)
Age: 76
Discharge: HOME | End: 2021-12-24
Attending: EMERGENCY MEDICINE
Payer: MEDICARE

## 2021-12-24 VITALS
TEMPERATURE: 99 F | RESPIRATION RATE: 16 BRPM | BODY MASS INDEX: 27.6 KG/M2 | SYSTOLIC BLOOD PRESSURE: 131 MMHG | HEART RATE: 58 BPM | OXYGEN SATURATION: 99 % | DIASTOLIC BLOOD PRESSURE: 52 MMHG | WEIGHT: 150 LBS | HEIGHT: 62 IN

## 2021-12-24 DIAGNOSIS — K57.92 ACUTE DIVERTICULITIS: Primary | ICD-10-CM

## 2021-12-24 PROCEDURE — 80047 BASIC METABLC PNL IONIZED CA: CPT

## 2021-12-24 PROCEDURE — 85025 COMPLETE CBC W/AUTO DIFF WBC: CPT | Performed by: EMERGENCY MEDICINE

## 2021-12-24 PROCEDURE — 99215 OFFICE O/P EST HI 40 MIN: CPT

## 2021-12-24 PROCEDURE — 74177 CT ABD & PELVIS W/CONTRAST: CPT | Performed by: EMERGENCY MEDICINE

## 2021-12-24 PROCEDURE — 99214 OFFICE O/P EST MOD 30 MIN: CPT

## 2021-12-24 PROCEDURE — 96360 HYDRATION IV INFUSION INIT: CPT

## 2021-12-24 RX ORDER — SODIUM CHLORIDE 9 MG/ML
1000 INJECTION, SOLUTION INTRAVENOUS ONCE
Status: COMPLETED | OUTPATIENT
Start: 2021-12-24 | End: 2021-12-24

## 2021-12-24 RX ORDER — METRONIDAZOLE 500 MG/1
500 TABLET ORAL 3 TIMES DAILY
Qty: 30 TABLET | Refills: 0 | Status: SHIPPED | OUTPATIENT
Start: 2021-12-24 | End: 2022-01-03

## 2021-12-24 RX ORDER — CEFDINIR 300 MG/1
300 CAPSULE ORAL 2 TIMES DAILY
Qty: 20 CAPSULE | Refills: 0 | Status: SHIPPED | OUTPATIENT
Start: 2021-12-24 | End: 2021-12-27

## 2021-12-24 NOTE — ED PROVIDER NOTES
Patient Seen in: [facility]      History   Patient presents with:  Abdomen/Flank Pain    Stated Complaint: DIVERTICULITIS    Subjective:   HPI    59-year-old with a history of hypertension, depression presents for evaluation of left lower HPI.  Constitutional and vital signs reviewed. All other systems reviewed and negative except as noted above.     Physical Exam     ED Triage Vitals [12/24/21 1313]   /87   Pulse 64   Resp 16   Temp 99.7 °F (37.6 °C)   Temp src Oral   SpO2 98 % informed both in person and in writing of the chronic appearing mild left hydronephrosis and hydroureter as well.                          Disposition and Plan     Clinical Impression:  Acute diverticulitis  (primary encounter diagnosis)     Disposition:  D

## 2021-12-24 NOTE — ED INITIAL ASSESSMENT (HPI)
Pt with LLQ pain and fever to 101.4f since yesterday; sees GI - hx diverticulitis and diverticulosis

## 2021-12-26 DIAGNOSIS — I10 ESSENTIAL HYPERTENSION: ICD-10-CM

## 2021-12-26 RX ORDER — METOPROLOL TARTRATE 50 MG/1
50 TABLET, FILM COATED ORAL DAILY
Qty: 90 TABLET | Refills: 0 | Status: SHIPPED | OUTPATIENT
Start: 2021-12-26 | End: 2022-03-30

## 2021-12-27 ENCOUNTER — TELEPHONE (OUTPATIENT)
Dept: INTERNAL MEDICINE CLINIC | Facility: CLINIC | Age: 76
End: 2021-12-27

## 2021-12-27 RX ORDER — CIPROFLOXACIN 500 MG/1
500 TABLET, FILM COATED ORAL 2 TIMES DAILY
Qty: 20 TABLET | Refills: 0 | Status: SHIPPED | OUTPATIENT
Start: 2021-12-27 | End: 2022-01-06

## 2021-12-27 NOTE — TELEPHONE ENCOUNTER
Spoke with patient, states symptoms since 12/25/21     Was prescribed cefdinir and metronidazole and experienced symptoms after including    1 episode of diarrhea this morning, itching to lower extremities.  Denies any redness, rashes, swelling or other vis

## 2021-12-27 NOTE — TELEPHONE ENCOUNTER
Pt seen 12/24 in IC for Acute diverticulitis   Stated she has been taking antibiotics which she feels she is allergic to as she as diarrhea and feels the 'inside of her body' is itching    No available appointments to f/u until Wednesday, pt extremely upse

## 2021-12-27 NOTE — TELEPHONE ENCOUNTER
Spoke with Tracie regarding message below, ok for patient to discontinue Cefdinir and take cipro and metronidazole as directed. Rx sent, patient notified and also made aware of medication instructions at this time. Patient verbalized understanding.

## 2021-12-27 NOTE — TELEPHONE ENCOUNTER
Discussed condition with Dr. Antonella Reed change her antibiotic to Cipro 500 mg one bid x 10 days. Pt to continue the metronidazole

## 2021-12-28 ENCOUNTER — TELEPHONE (OUTPATIENT)
Dept: INTERNAL MEDICINE CLINIC | Facility: CLINIC | Age: 76
End: 2021-12-28

## 2021-12-28 NOTE — TELEPHONE ENCOUNTER
Incoming fax from [facility]     Patients ER record from 12/24/2021    Placed in  in basket for review

## 2022-01-01 RX ORDER — HYDROXYZINE HYDROCHLORIDE 25 MG/1
TABLET, FILM COATED ORAL
Qty: 90 TABLET | Refills: 0 | Status: SHIPPED | OUTPATIENT
Start: 2022-01-01

## 2022-01-16 DIAGNOSIS — E78.5 HYPERLIPIDEMIA, UNSPECIFIED HYPERLIPIDEMIA TYPE: ICD-10-CM

## 2022-01-17 RX ORDER — EZETIMIBE 10 MG/1
10 TABLET ORAL NIGHTLY
Qty: 30 TABLET | Refills: 0 | Status: SHIPPED | OUTPATIENT
Start: 2022-01-17

## 2022-01-17 NOTE — TELEPHONE ENCOUNTER
Julieth hussein sent, 1 month of medication sent. Ezetimibe 10 mg  Filled 12-5-21  Qty 30  0 refills  No upcoming appt.    LOV 10-18-21

## 2022-02-14 RX ORDER — EZETIMIBE 10 MG/1
TABLET ORAL
Qty: 30 TABLET | Refills: 0 | Status: SHIPPED | OUTPATIENT
Start: 2022-02-14 | End: 2022-03-01

## 2022-02-14 NOTE — TELEPHONE ENCOUNTER
Lm for pt to return call, sent detailed [employer] message. Pt came due for fasting labs in December. Ezetimibe 10 mg  Filled 1-17-22  Qty 30  0 refills  No upcoming appt.    LOV 10-18-21

## 2022-03-01 ENCOUNTER — OFFICE VISIT (OUTPATIENT)
Dept: INTERNAL MEDICINE CLINIC | Facility: CLINIC | Age: 77
End: 2022-03-01
Payer: MEDICARE

## 2022-03-01 VITALS
SYSTOLIC BLOOD PRESSURE: 120 MMHG | WEIGHT: 156 LBS | DIASTOLIC BLOOD PRESSURE: 68 MMHG | BODY MASS INDEX: 28.71 KG/M2 | HEIGHT: 62 IN | HEART RATE: 64 BPM | RESPIRATION RATE: 16 BRPM | TEMPERATURE: 98 F

## 2022-03-01 DIAGNOSIS — I10 ESSENTIAL HYPERTENSION: Primary | ICD-10-CM

## 2022-03-01 DIAGNOSIS — R42 LIGHTHEADEDNESS: ICD-10-CM

## 2022-03-01 DIAGNOSIS — E78.5 HYPERLIPIDEMIA, UNSPECIFIED HYPERLIPIDEMIA TYPE: ICD-10-CM

## 2022-03-01 DIAGNOSIS — R73.01 IMPAIRED FASTING GLUCOSE: ICD-10-CM

## 2022-03-01 PROCEDURE — 3074F SYST BP LT 130 MM HG: CPT | Performed by: FAMILY MEDICINE

## 2022-03-01 PROCEDURE — 99214 OFFICE O/P EST MOD 30 MIN: CPT | Performed by: FAMILY MEDICINE

## 2022-03-01 PROCEDURE — 3078F DIAST BP <80 MM HG: CPT | Performed by: FAMILY MEDICINE

## 2022-03-01 PROCEDURE — 3008F BODY MASS INDEX DOCD: CPT | Performed by: FAMILY MEDICINE

## 2022-03-01 RX ORDER — EZETIMIBE 10 MG/1
TABLET ORAL
Qty: 30 TABLET | Refills: 0 | Status: SHIPPED | OUTPATIENT
Start: 2022-03-01

## 2022-03-02 ENCOUNTER — TELEPHONE (OUTPATIENT)
Dept: INTERNAL MEDICINE CLINIC | Facility: CLINIC | Age: 77
End: 2022-03-02

## 2022-03-02 NOTE — TELEPHONE ENCOUNTER
Discussed Pt's condition with Dr. Karina Peterson. He would like Pt to have a nuclear exercise stress test. Order is in the EMR. Please call Pt and have her schedule.

## 2022-03-03 ENCOUNTER — LAB ENCOUNTER (OUTPATIENT)
Dept: LAB | Age: 77
End: 2022-03-03
Attending: FAMILY MEDICINE
Payer: MEDICARE

## 2022-03-03 DIAGNOSIS — I10 ESSENTIAL HYPERTENSION: ICD-10-CM

## 2022-03-03 DIAGNOSIS — E78.5 HYPERLIPIDEMIA, UNSPECIFIED HYPERLIPIDEMIA TYPE: ICD-10-CM

## 2022-03-03 DIAGNOSIS — R73.01 IMPAIRED FASTING GLUCOSE: ICD-10-CM

## 2022-03-03 DIAGNOSIS — R42 LIGHTHEADEDNESS: ICD-10-CM

## 2022-03-03 LAB
ALBUMIN SERPL-MCNC: 3.4 G/DL (ref 3.4–5)
ALBUMIN/GLOB SERPL: 1.3 {RATIO} (ref 1–2)
ALP LIVER SERPL-CCNC: 88 U/L
ALT SERPL-CCNC: 25 U/L
AST SERPL-CCNC: 14 U/L (ref 15–37)
BASOPHILS # BLD AUTO: 0.02 X10(3) UL (ref 0–0.2)
BASOPHILS NFR BLD AUTO: 0.3 %
BILIRUB SERPL-MCNC: 0.4 MG/DL (ref 0.1–2)
BUN BLD-MCNC: 19 MG/DL (ref 7–18)
CALCIUM BLD-MCNC: 8.6 MG/DL (ref 8.5–10.1)
CHLORIDE SERPL-SCNC: 114 MMOL/L (ref 98–112)
CHOLEST SERPL-MCNC: 225 MG/DL (ref ?–200)
CO2 SERPL-SCNC: 22 MMOL/L (ref 21–32)
CREAT BLD-MCNC: 1.12 MG/DL
EOSINOPHIL # BLD AUTO: 0 X10(3) UL (ref 0–0.7)
EOSINOPHIL NFR BLD AUTO: 0 %
ERYTHROCYTE [DISTWIDTH] IN BLOOD BY AUTOMATED COUNT: 13.1 %
EST. AVERAGE GLUCOSE BLD GHB EST-MCNC: 123 MG/DL (ref 68–126)
FASTING PATIENT LIPID ANSWER: YES
FASTING STATUS PATIENT QL REPORTED: YES
GLOBULIN PLAS-MCNC: 2.6 G/DL (ref 2.8–4.4)
GLUCOSE BLD-MCNC: 143 MG/DL (ref 70–99)
HBA1C MFR BLD: 5.9 % (ref ?–5.7)
HCT VFR BLD AUTO: 39 %
HDLC SERPL-MCNC: 76 MG/DL (ref 40–59)
HGB BLD-MCNC: 13.1 G/DL
IMM GRANULOCYTES # BLD AUTO: 0.03 X10(3) UL (ref 0–1)
IMM GRANULOCYTES NFR BLD: 0.5 %
LDLC SERPL CALC-MCNC: 121 MG/DL (ref ?–100)
LYMPHOCYTES # BLD AUTO: 0.76 X10(3) UL (ref 1–4)
LYMPHOCYTES NFR BLD AUTO: 11.5 %
MCH RBC QN AUTO: 31.3 PG (ref 26–34)
MCHC RBC AUTO-ENTMCNC: 33.6 G/DL (ref 31–37)
MCV RBC AUTO: 93.1 FL
MONOCYTES # BLD AUTO: 0.24 X10(3) UL (ref 0.1–1)
MONOCYTES NFR BLD AUTO: 3.6 %
NEUTROPHILS # BLD AUTO: 5.55 X10 (3) UL (ref 1.5–7.7)
NEUTROPHILS # BLD AUTO: 5.55 X10(3) UL (ref 1.5–7.7)
NEUTROPHILS NFR BLD AUTO: 84.1 %
NONHDLC SERPL-MCNC: 149 MG/DL (ref ?–130)
OSMOLALITY SERPL CALC.SUM OF ELEC: 299 MOSM/KG (ref 275–295)
PLATELET # BLD AUTO: 262 10(3)UL (ref 150–450)
POTASSIUM SERPL-SCNC: 4.5 MMOL/L (ref 3.5–5.1)
PROT SERPL-MCNC: 6 G/DL (ref 6.4–8.2)
RBC # BLD AUTO: 4.19 X10(6)UL
SODIUM SERPL-SCNC: 142 MMOL/L (ref 136–145)
TRIGL SERPL-MCNC: 163 MG/DL (ref 30–149)
TSI SER-ACNC: 0.84 MIU/ML (ref 0.36–3.74)
VIT B12 SERPL-MCNC: 342 PG/ML (ref 193–986)
VIT D+METAB SERPL-MCNC: 23.6 NG/ML (ref 30–100)
VLDLC SERPL CALC-MCNC: 29 MG/DL (ref 0–30)
WBC # BLD AUTO: 6.6 X10(3) UL (ref 4–11)

## 2022-03-03 PROCEDURE — 36415 COLL VENOUS BLD VENIPUNCTURE: CPT

## 2022-03-03 PROCEDURE — 85025 COMPLETE CBC W/AUTO DIFF WBC: CPT

## 2022-03-03 PROCEDURE — 84443 ASSAY THYROID STIM HORMONE: CPT

## 2022-03-03 PROCEDURE — 82306 VITAMIN D 25 HYDROXY: CPT

## 2022-03-03 PROCEDURE — 82607 VITAMIN B-12: CPT

## 2022-03-03 PROCEDURE — 83036 HEMOGLOBIN GLYCOSYLATED A1C: CPT

## 2022-03-03 PROCEDURE — 80061 LIPID PANEL: CPT

## 2022-03-03 PROCEDURE — 80053 COMPREHEN METABOLIC PANEL: CPT

## 2022-03-08 ENCOUNTER — TELEPHONE (OUTPATIENT)
Dept: INTERNAL MEDICINE CLINIC | Facility: CLINIC | Age: 77
End: 2022-03-08

## 2022-03-08 RX ORDER — ERGOCALCIFEROL 1.25 MG/1
50000 CAPSULE ORAL WEEKLY
Qty: 12 CAPSULE | Refills: 1 | Status: SHIPPED | OUTPATIENT
Start: 2022-03-08

## 2022-03-08 NOTE — TELEPHONE ENCOUNTER
----- Message from HERB Evans sent at 3/5/2022  1:24 PM CST -----  Glucose 143 up but Hgba1c better, continue to watch diet. Recheck in 6 months. BUN/Creatinine up- increase fluids. Total protein level low, lets recheck in one month. Has Pt been dieting, or eating less protein at all? Lipids improving a little. Continue zetia. Recheck in 6 months. Patient has Vit D deficiency. Pt needs vit D 43970 IU once a week for 6 months, after 2000- 3000 IU daily!!!. Recheck vit D in 6 months. Rest of labs ok.

## 2022-03-12 ENCOUNTER — LAB ENCOUNTER (OUTPATIENT)
Dept: LAB | Age: 77
End: 2022-03-12
Attending: FAMILY MEDICINE
Payer: MEDICARE

## 2022-03-12 DIAGNOSIS — Z20.822 SUSPECTED COVID-19 VIRUS INFECTION: ICD-10-CM

## 2022-03-14 ENCOUNTER — ORDER TRANSCRIPTION (OUTPATIENT)
Dept: ADMINISTRATIVE | Facility: HOSPITAL | Age: 77
End: 2022-03-14

## 2022-03-14 LAB — SARS-COV-2 RNA RESP QL NAA+PROBE: NOT DETECTED

## 2022-03-26 ENCOUNTER — LAB ENCOUNTER (OUTPATIENT)
Dept: LAB | Age: 77
End: 2022-03-26
Attending: FAMILY MEDICINE
Payer: MEDICARE

## 2022-03-26 DIAGNOSIS — Z01.812 ENCOUNTER FOR PREPROCEDURE SCREENING LABORATORY TESTING FOR COVID-19: ICD-10-CM

## 2022-03-26 DIAGNOSIS — Z20.822 ENCOUNTER FOR PREPROCEDURE SCREENING LABORATORY TESTING FOR COVID-19: ICD-10-CM

## 2022-03-27 LAB — SARS-COV-2 RNA RESP QL NAA+PROBE: NOT DETECTED

## 2022-03-28 ENCOUNTER — TELEPHONE (OUTPATIENT)
Dept: INTERNAL MEDICINE CLINIC | Facility: CLINIC | Age: 77
End: 2022-03-28

## 2022-03-29 ENCOUNTER — HOSPITAL ENCOUNTER (OUTPATIENT)
Dept: CV DIAGNOSTICS | Facility: HOSPITAL | Age: 77
Discharge: HOME | End: 2022-03-29
Attending: FAMILY MEDICINE
Payer: MEDICARE

## 2022-03-29 DIAGNOSIS — I27.20 PULMONARY HYPERTENSION (HCC): ICD-10-CM

## 2022-03-29 DIAGNOSIS — R42 POSITIONAL LIGHTHEADEDNESS: ICD-10-CM

## 2022-03-29 DIAGNOSIS — R00.1 BRADYCARDIA: ICD-10-CM

## 2022-03-29 DIAGNOSIS — E78.5 HYPERLIPIDEMIA, UNSPECIFIED HYPERLIPIDEMIA TYPE: ICD-10-CM

## 2022-03-29 DIAGNOSIS — I10 PRIMARY HYPERTENSION: ICD-10-CM

## 2022-03-29 PROCEDURE — 93017 CV STRESS TEST TRACING ONLY: CPT | Performed by: FAMILY MEDICINE

## 2022-03-29 PROCEDURE — 93018 CV STRESS TEST I&R ONLY: CPT | Performed by: FAMILY MEDICINE

## 2022-03-29 PROCEDURE — 78452 HT MUSCLE IMAGE SPECT MULT: CPT | Performed by: FAMILY MEDICINE

## 2022-03-30 RX ORDER — METOPROLOL TARTRATE 50 MG/1
50 TABLET, FILM COATED ORAL DAILY
Qty: 90 TABLET | Refills: 1 | Status: SHIPPED | OUTPATIENT
Start: 2022-03-30

## 2022-04-08 ENCOUNTER — TELEPHONE (OUTPATIENT)
Dept: INTERNAL MEDICINE CLINIC | Facility: CLINIC | Age: 77
End: 2022-04-08

## 2022-04-08 NOTE — TELEPHONE ENCOUNTER
Incoming fax from [facility]    Patients Radiology report from 4/7/2022    Placed in  in basket for review

## 2022-04-19 ENCOUNTER — TELEPHONE (OUTPATIENT)
Dept: INTERNAL MEDICINE CLINIC | Facility: CLINIC | Age: 77
End: 2022-04-19

## 2022-04-19 NOTE — TELEPHONE ENCOUNTER
Pt came to inquire about her test results. Pt was notified her results according to  apn notes were Normal. Pt verbalized understanding and had no further questions.

## 2022-04-20 ENCOUNTER — LAB ENCOUNTER (OUTPATIENT)
Dept: LAB | Age: 77
End: 2022-04-20
Attending: FAMILY MEDICINE
Payer: MEDICARE

## 2022-04-20 DIAGNOSIS — R79.9 ABNORMAL SERUM TOTAL PROTEIN LEVEL: ICD-10-CM

## 2022-04-20 LAB
ALBUMIN SERPL-MCNC: 3.8 G/DL (ref 3.4–5)
ALBUMIN/GLOB SERPL: 1.4 {RATIO} (ref 1–2)
ALP LIVER SERPL-CCNC: 85 U/L
ALT SERPL-CCNC: 24 U/L
ANION GAP SERPL CALC-SCNC: 4 MMOL/L (ref 0–18)
AST SERPL-CCNC: 19 U/L (ref 15–37)
BILIRUB SERPL-MCNC: 0.4 MG/DL (ref 0.1–2)
BUN BLD-MCNC: 19 MG/DL (ref 7–18)
CALCIUM BLD-MCNC: 9.6 MG/DL (ref 8.5–10.1)
CHLORIDE SERPL-SCNC: 111 MMOL/L (ref 98–112)
CO2 SERPL-SCNC: 26 MMOL/L (ref 21–32)
CREAT BLD-MCNC: 1.1 MG/DL
FASTING STATUS PATIENT QL REPORTED: YES
GLOBULIN PLAS-MCNC: 2.8 G/DL (ref 2.8–4.4)
GLUCOSE BLD-MCNC: 92 MG/DL (ref 70–99)
OSMOLALITY SERPL CALC.SUM OF ELEC: 294 MOSM/KG (ref 275–295)
POTASSIUM SERPL-SCNC: 5.1 MMOL/L (ref 3.5–5.1)
PROT SERPL-MCNC: 6.6 G/DL (ref 6.4–8.2)
SODIUM SERPL-SCNC: 141 MMOL/L (ref 136–145)

## 2022-04-20 PROCEDURE — 80053 COMPREHEN METABOLIC PANEL: CPT

## 2022-04-20 PROCEDURE — 36415 COLL VENOUS BLD VENIPUNCTURE: CPT

## 2022-04-26 RX ORDER — EZETIMIBE 10 MG/1
10 TABLET ORAL NIGHTLY
Qty: 90 TABLET | Refills: 2 | Status: SHIPPED | OUTPATIENT
Start: 2022-04-26

## 2022-04-26 NOTE — TELEPHONE ENCOUNTER
Ezetimbie 10 mg  Filled 3-1-22  Qty 30  0 refills  No upcoming appt.    LOV 3-1-22  Labs: 3-3-22: Lipid

## 2022-05-16 PROBLEM — L30.8: Status: ACTIVE | Noted: 2022-05-16

## 2022-05-16 PROBLEM — M18.11 PRIMARY OSTEOARTHRITIS OF FIRST CARPOMETACARPAL JOINT OF RIGHT HAND: Status: RESOLVED | Noted: 2017-07-07 | Resolved: 2022-05-16

## 2022-05-16 PROBLEM — M19.012 OSTEOARTHRITIS OF LEFT SHOULDER: Status: ACTIVE | Noted: 2022-05-16

## 2022-05-17 ENCOUNTER — OFFICE VISIT (OUTPATIENT)
Dept: INTERNAL MEDICINE CLINIC | Facility: CLINIC | Age: 77
End: 2022-05-17
Payer: MEDICARE

## 2022-05-17 VITALS
DIASTOLIC BLOOD PRESSURE: 80 MMHG | BODY MASS INDEX: 30.03 KG/M2 | OXYGEN SATURATION: 97 % | WEIGHT: 163.19 LBS | HEART RATE: 64 BPM | TEMPERATURE: 99 F | RESPIRATION RATE: 16 BRPM | SYSTOLIC BLOOD PRESSURE: 130 MMHG | HEIGHT: 62 IN

## 2022-05-17 DIAGNOSIS — F41.1 GENERALIZED ANXIETY DISORDER: ICD-10-CM

## 2022-05-17 DIAGNOSIS — I27.20 PULMONARY HYPERTENSION (HCC): ICD-10-CM

## 2022-05-17 DIAGNOSIS — M81.0 AGE-RELATED OSTEOPOROSIS WITHOUT CURRENT PATHOLOGICAL FRACTURE: ICD-10-CM

## 2022-05-17 DIAGNOSIS — N18.31 STAGE 3A CHRONIC KIDNEY DISEASE (HCC): ICD-10-CM

## 2022-05-17 DIAGNOSIS — M25.551 RIGHT HIP PAIN: ICD-10-CM

## 2022-05-17 DIAGNOSIS — L50.1 IDIOPATHIC URTICARIA: ICD-10-CM

## 2022-05-17 DIAGNOSIS — Z00.00 ENCOUNTER FOR ANNUAL HEALTH EXAMINATION: ICD-10-CM

## 2022-05-17 DIAGNOSIS — E78.5 HYPERLIPIDEMIA, UNSPECIFIED HYPERLIPIDEMIA TYPE: ICD-10-CM

## 2022-05-17 DIAGNOSIS — I10 PRIMARY HYPERTENSION: ICD-10-CM

## 2022-05-17 DIAGNOSIS — K44.9 DIAPHRAGMATIC HERNIA WITHOUT OBSTRUCTION AND WITHOUT GANGRENE: ICD-10-CM

## 2022-05-17 DIAGNOSIS — F33.9 EPISODE OF RECURRENT MAJOR DEPRESSIVE DISORDER, UNSPECIFIED DEPRESSION EPISODE SEVERITY (HCC): ICD-10-CM

## 2022-05-17 DIAGNOSIS — J30.9 ALLERGIC RHINITIS, UNSPECIFIED SEASONALITY, UNSPECIFIED TRIGGER: ICD-10-CM

## 2022-05-17 DIAGNOSIS — M19.012 PRIMARY OSTEOARTHRITIS OF LEFT SHOULDER: ICD-10-CM

## 2022-05-17 DIAGNOSIS — E55.9 VITAMIN D DEFICIENCY: ICD-10-CM

## 2022-05-17 DIAGNOSIS — K21.00 GASTROESOPHAGEAL REFLUX DISEASE WITH ESOPHAGITIS WITHOUT HEMORRHAGE: ICD-10-CM

## 2022-05-17 DIAGNOSIS — G43.009 MIGRAINE WITHOUT AURA AND WITHOUT STATUS MIGRAINOSUS, NOT INTRACTABLE: ICD-10-CM

## 2022-05-17 DIAGNOSIS — I70.0 ATHEROSCLEROSIS OF ABDOMINAL AORTA (HCC): ICD-10-CM

## 2022-05-17 DIAGNOSIS — H25.9 AGE-RELATED CATARACT OF BOTH EYES, UNSPECIFIED AGE-RELATED CATARACT TYPE: ICD-10-CM

## 2022-05-17 DIAGNOSIS — D23.9 DERMATOFIBROMA: ICD-10-CM

## 2022-05-17 DIAGNOSIS — L30.8 PERIVASCULAR DERMATITIS: Primary | ICD-10-CM

## 2022-05-17 DIAGNOSIS — R00.0 TACHYCARDIA: ICD-10-CM

## 2022-05-17 DIAGNOSIS — G47.00 INSOMNIA, UNSPECIFIED TYPE: ICD-10-CM

## 2022-05-17 DIAGNOSIS — N95.2 POSTMENOPAUSAL ATROPHIC VAGINITIS: ICD-10-CM

## 2022-05-17 DIAGNOSIS — K57.30 DIVERTICULOSIS OF COLON: ICD-10-CM

## 2022-05-17 PROCEDURE — 99397 PER PM REEVAL EST PAT 65+ YR: CPT | Performed by: FAMILY MEDICINE

## 2022-05-17 PROCEDURE — 3079F DIAST BP 80-89 MM HG: CPT | Performed by: FAMILY MEDICINE

## 2022-05-17 PROCEDURE — G0439 PPPS, SUBSEQ VISIT: HCPCS | Performed by: FAMILY MEDICINE

## 2022-05-17 PROCEDURE — 96160 PT-FOCUSED HLTH RISK ASSMT: CPT | Performed by: FAMILY MEDICINE

## 2022-05-17 PROCEDURE — 3008F BODY MASS INDEX DOCD: CPT | Performed by: FAMILY MEDICINE

## 2022-05-17 PROCEDURE — 3075F SYST BP GE 130 - 139MM HG: CPT | Performed by: FAMILY MEDICINE

## 2022-05-17 RX ORDER — BUPROPION HYDROCHLORIDE 150 MG/1
150 TABLET, EXTENDED RELEASE ORAL DAILY
Qty: 90 TABLET | Refills: 0 | Status: SHIPPED | OUTPATIENT
Start: 2022-05-17

## 2022-05-17 RX ORDER — BUPROPION HYDROCHLORIDE 150 MG/1
TABLET, EXTENDED RELEASE ORAL
Qty: 30 TABLET | Refills: 0 | OUTPATIENT
Start: 2022-05-17

## 2022-06-23 ENCOUNTER — OFFICE VISIT (OUTPATIENT)
Dept: INTERNAL MEDICINE CLINIC | Facility: CLINIC | Age: 77
End: 2022-06-23
Payer: MEDICARE

## 2022-06-23 ENCOUNTER — LAB ENCOUNTER (OUTPATIENT)
Dept: LAB | Age: 77
End: 2022-06-23
Attending: FAMILY MEDICINE
Payer: MEDICARE

## 2022-06-23 VITALS
BODY MASS INDEX: 30.03 KG/M2 | OXYGEN SATURATION: 97 % | HEIGHT: 62 IN | HEART RATE: 75 BPM | TEMPERATURE: 99 F | WEIGHT: 163.19 LBS | SYSTOLIC BLOOD PRESSURE: 126 MMHG | DIASTOLIC BLOOD PRESSURE: 60 MMHG

## 2022-06-23 DIAGNOSIS — R53.83 FATIGUE, UNSPECIFIED TYPE: Primary | ICD-10-CM

## 2022-06-23 DIAGNOSIS — R50.9 LOW GRADE FEVER: ICD-10-CM

## 2022-06-23 DIAGNOSIS — R53.83 FATIGUE, UNSPECIFIED TYPE: ICD-10-CM

## 2022-06-23 LAB
BASOPHILS # BLD AUTO: 0.02 X10(3) UL (ref 0–0.2)
BASOPHILS NFR BLD AUTO: 0.2 %
DEPRECATED HBV CORE AB SER IA-ACNC: 63.9 NG/ML
EOSINOPHIL # BLD AUTO: 0.02 X10(3) UL (ref 0–0.7)
EOSINOPHIL NFR BLD AUTO: 0.2 %
ERYTHROCYTE [DISTWIDTH] IN BLOOD BY AUTOMATED COUNT: 12.4 %
FOLATE SERPL-MCNC: 14.4 NG/ML (ref 8.7–?)
HCT VFR BLD AUTO: 41.9 %
HGB BLD-MCNC: 13.9 G/DL
IMM GRANULOCYTES # BLD AUTO: 0.04 X10(3) UL (ref 0–1)
IMM GRANULOCYTES NFR BLD: 0.4 %
LYMPHOCYTES # BLD AUTO: 1.69 X10(3) UL (ref 1–4)
LYMPHOCYTES NFR BLD AUTO: 15.7 %
MAGNESIUM SERPL-MCNC: 2.3 MG/DL (ref 1.6–2.6)
MCH RBC QN AUTO: 30.8 PG (ref 26–34)
MCHC RBC AUTO-ENTMCNC: 33.2 G/DL (ref 31–37)
MCV RBC AUTO: 92.7 FL
MONOCYTES # BLD AUTO: 0.69 X10(3) UL (ref 0.1–1)
MONOCYTES NFR BLD AUTO: 6.4 %
NEUTROPHILS # BLD AUTO: 8.29 X10 (3) UL (ref 1.5–7.7)
NEUTROPHILS # BLD AUTO: 8.29 X10(3) UL (ref 1.5–7.7)
NEUTROPHILS NFR BLD AUTO: 77.1 %
PLATELET # BLD AUTO: 310 10(3)UL (ref 150–450)
RBC # BLD AUTO: 4.52 X10(6)UL
T4 FREE SERPL-MCNC: 0.8 NG/DL (ref 0.8–1.7)
TSI SER-ACNC: 0.77 MIU/ML (ref 0.36–3.74)
VIT B12 SERPL-MCNC: 424 PG/ML (ref 193–986)
WBC # BLD AUTO: 10.8 X10(3) UL (ref 4–11)

## 2022-06-23 PROCEDURE — 84630 ASSAY OF ZINC: CPT

## 2022-06-23 PROCEDURE — 3078F DIAST BP <80 MM HG: CPT | Performed by: FAMILY MEDICINE

## 2022-06-23 PROCEDURE — 84443 ASSAY THYROID STIM HORMONE: CPT

## 2022-06-23 PROCEDURE — 82607 VITAMIN B-12: CPT

## 2022-06-23 PROCEDURE — 83735 ASSAY OF MAGNESIUM: CPT

## 2022-06-23 PROCEDURE — 82728 ASSAY OF FERRITIN: CPT

## 2022-06-23 PROCEDURE — 82746 ASSAY OF FOLIC ACID SERUM: CPT

## 2022-06-23 PROCEDURE — 3008F BODY MASS INDEX DOCD: CPT | Performed by: FAMILY MEDICINE

## 2022-06-23 PROCEDURE — 36415 COLL VENOUS BLD VENIPUNCTURE: CPT

## 2022-06-23 PROCEDURE — 85025 COMPLETE CBC W/AUTO DIFF WBC: CPT

## 2022-06-23 PROCEDURE — 99213 OFFICE O/P EST LOW 20 MIN: CPT | Performed by: FAMILY MEDICINE

## 2022-06-23 PROCEDURE — 3074F SYST BP LT 130 MM HG: CPT | Performed by: FAMILY MEDICINE

## 2022-06-23 PROCEDURE — 84439 ASSAY OF FREE THYROXINE: CPT

## 2022-06-23 RX ORDER — DENOSUMAB 60 MG/ML
INJECTION SUBCUTANEOUS
COMMUNITY
Start: 2022-04-22

## 2022-07-08 ENCOUNTER — TELEPHONE (OUTPATIENT)
Dept: INTERNAL MEDICINE CLINIC | Facility: CLINIC | Age: 77
End: 2022-07-08

## 2022-07-08 NOTE — TELEPHONE ENCOUNTER
Pt returning call for test results. Per pt she said she reviewed her results on mychart and said that she saw it was all normal. Pt stated that she doesn't need a call back unless gera or nurses believe they need to communicate anything else.      Confirmed [phone]

## 2022-08-24 RX ORDER — ERGOCALCIFEROL 1.25 MG/1
50000 CAPSULE ORAL WEEKLY
Qty: 4 CAPSULE | Refills: 0 | Status: SHIPPED | OUTPATIENT
Start: 2022-08-24 | End: 2023-02-14 | Stop reason: ALTCHOICE

## 2022-08-24 NOTE — TELEPHONE ENCOUNTER
Due for repeat check on 9-8-22      Ergocalciferol   Filled 3-8-22  Qty 12  1 refill  No upcoming appt. LOV 6-23-22   Labs: 3-3-22 Vit.  D

## 2022-08-31 ENCOUNTER — TELEPHONE (OUTPATIENT)
Dept: INTERNAL MEDICINE CLINIC | Facility: CLINIC | Age: 77
End: 2022-08-31

## 2022-08-31 NOTE — TELEPHONE ENCOUNTER
Incoming fax from [facility]     Patients Mammogram results from 8/15/2022    Recommend routine screening in 1 year    Benign findings;  There is no evidence of malignancy (BI-RADS Category 2)      Placed in TO in basket for review  And HM updated

## 2022-09-02 RX ORDER — BUPROPION HYDROCHLORIDE 150 MG/1
TABLET, EXTENDED RELEASE ORAL
Qty: 90 TABLET | Refills: 0 | Status: SHIPPED | OUTPATIENT
Start: 2022-09-02

## 2022-09-18 ENCOUNTER — HOSPITAL ENCOUNTER (OUTPATIENT)
Age: 77
Discharge: HOME | End: 2022-09-18
Attending: EMERGENCY MEDICINE

## 2022-09-18 VITALS
DIASTOLIC BLOOD PRESSURE: 75 MMHG | RESPIRATION RATE: 16 BRPM | OXYGEN SATURATION: 96 % | SYSTOLIC BLOOD PRESSURE: 122 MMHG | WEIGHT: 150 LBS | TEMPERATURE: 97 F | BODY MASS INDEX: 28.32 KG/M2 | HEIGHT: 61 IN | HEART RATE: 64 BPM

## 2022-09-18 DIAGNOSIS — K57.92 ACUTE DIVERTICULITIS: Primary | ICD-10-CM

## 2022-09-18 LAB
POCT BILIRUBIN URINE: NEGATIVE
POCT GLUCOSE URINE: NEGATIVE MG/DL
POCT KETONE URINE: NEGATIVE MG/DL
POCT LEUKOCYTE ESTERASE URINE: NEGATIVE
POCT NITRITE URINE: NEGATIVE
POCT PH URINE: 5.5 (ref 5–8)
POCT PROTEIN URINE: NEGATIVE MG/DL
POCT SPECIFIC GRAVITY URINE: 1.01
POCT URINE COLOR: YELLOW
POCT UROBILINOGEN URINE: 0.2 MG/DL
SARS-COV-2 RNA RESP QL NAA+PROBE: NOT DETECTED

## 2022-09-18 PROCEDURE — 99213 OFFICE O/P EST LOW 20 MIN: CPT

## 2022-09-18 PROCEDURE — 81002 URINALYSIS NONAUTO W/O SCOPE: CPT | Performed by: EMERGENCY MEDICINE

## 2022-09-18 PROCEDURE — 99214 OFFICE O/P EST MOD 30 MIN: CPT

## 2022-09-18 RX ORDER — CIPROFLOXACIN 500 MG/1
500 TABLET, FILM COATED ORAL 2 TIMES DAILY
Qty: 20 TABLET | Refills: 0 | Status: SHIPPED | OUTPATIENT
Start: 2022-09-18 | End: 2022-09-28

## 2022-09-18 RX ORDER — METRONIDAZOLE 500 MG/1
500 TABLET ORAL 2 TIMES DAILY
Qty: 20 TABLET | Refills: 0 | Status: SHIPPED | OUTPATIENT
Start: 2022-09-18 | End: 2022-09-28

## 2022-09-18 NOTE — ED INITIAL ASSESSMENT (HPI)
Hx of diverticulitis; tmax 99.9f on thurs, not having normal BM - less BM, fatigue, body aches/chills    Home covid test thurs night negative    No abd pain/dysuria/cough

## 2022-09-20 ENCOUNTER — OFFICE VISIT (OUTPATIENT)
Dept: INTERNAL MEDICINE CLINIC | Facility: CLINIC | Age: 77
End: 2022-09-20

## 2022-09-20 ENCOUNTER — TELEPHONE (OUTPATIENT)
Dept: INTERNAL MEDICINE CLINIC | Facility: CLINIC | Age: 77
End: 2022-09-20

## 2022-09-20 VITALS
HEART RATE: 70 BPM | WEIGHT: 158.81 LBS | BODY MASS INDEX: 29.98 KG/M2 | DIASTOLIC BLOOD PRESSURE: 70 MMHG | HEIGHT: 61 IN | RESPIRATION RATE: 16 BRPM | OXYGEN SATURATION: 98 % | TEMPERATURE: 98 F | SYSTOLIC BLOOD PRESSURE: 121 MMHG

## 2022-09-20 DIAGNOSIS — M79.645 THUMB PAIN, LEFT: ICD-10-CM

## 2022-09-20 DIAGNOSIS — K57.92 DIVERTICULITIS: Primary | ICD-10-CM

## 2022-09-20 PROCEDURE — 3078F DIAST BP <80 MM HG: CPT | Performed by: FAMILY MEDICINE

## 2022-09-20 PROCEDURE — 3074F SYST BP LT 130 MM HG: CPT | Performed by: FAMILY MEDICINE

## 2022-09-20 PROCEDURE — 3008F BODY MASS INDEX DOCD: CPT | Performed by: FAMILY MEDICINE

## 2022-09-20 PROCEDURE — 99213 OFFICE O/P EST LOW 20 MIN: CPT | Performed by: FAMILY MEDICINE

## 2022-09-20 RX ORDER — MONTELUKAST SODIUM 10 MG/1
10 TABLET ORAL NIGHTLY
COMMUNITY
Start: 2022-09-05

## 2022-09-20 NOTE — TELEPHONE ENCOUNTER
Pt was seen in  on 9/18 for Fatigue, Body aches/ Chills and Abd pain   Pt Covid test was negative. Pt scheduled f/u for today in office. Pt relates symptoms to Diverticulitis that she has experienced in the past.   She also wants to discuss a medication she was given that she believes is causing hand pain. FYI appointment was scheduled in office due to negative Covid test.   No fever present.

## 2022-09-21 ENCOUNTER — TELEPHONE (OUTPATIENT)
Dept: INTERNAL MEDICINE CLINIC | Facility: CLINIC | Age: 77
End: 2022-09-21

## 2022-09-21 NOTE — TELEPHONE ENCOUNTER
Patient expresses concern that cipro and or flagyl may be causing drowsiness that she over slept today. Wondering if she should continue to take or be switched to another medication. She is also wondering if the montelukast could have an interaction with the two or causing her drowsiness? Please advise. She was advised to hold off on antibiotics until able to get recommendations.

## 2022-09-21 NOTE — TELEPHONE ENCOUNTER
Pt called stating after seeing [place] Yelitza she was prescribed abx for her thumb. Pt states today she has been very fatigue. She states she overslept her alarm by 2 hours. She also states she does not think she could drive due to how sleepy she is. Pt denies confusion, dizziness, or headaches. Pt thinks it might be due to her abx and if there is an interaction. Please advise.

## 2022-09-22 DIAGNOSIS — I10 ESSENTIAL HYPERTENSION: ICD-10-CM

## 2022-09-22 RX ORDER — METOPROLOL TARTRATE 50 MG/1
TABLET, FILM COATED ORAL
Qty: 90 TABLET | Refills: 0 | Status: SHIPPED | OUTPATIENT
Start: 2022-09-22

## 2022-09-22 NOTE — TELEPHONE ENCOUNTER
Relayed to patient. Patient verbalized understanding stating she figured out the problem and will continue to do what is working for her.

## 2022-11-07 ENCOUNTER — LAB ENCOUNTER (OUTPATIENT)
Dept: LAB | Age: 77
End: 2022-11-07
Attending: FAMILY MEDICINE
Payer: MEDICARE

## 2022-11-07 DIAGNOSIS — R73.09 ELEVATED GLUCOSE: ICD-10-CM

## 2022-11-07 DIAGNOSIS — E55.9 VITAMIN D DEFICIENCY: ICD-10-CM

## 2022-11-07 DIAGNOSIS — E78.5 HYPERLIPIDEMIA, UNSPECIFIED HYPERLIPIDEMIA TYPE: ICD-10-CM

## 2022-11-07 LAB
CHOLEST SERPL-MCNC: 228 MG/DL (ref ?–200)
EST. AVERAGE GLUCOSE BLD GHB EST-MCNC: 126 MG/DL (ref 68–126)
FASTING PATIENT LIPID ANSWER: YES
HBA1C MFR BLD: 6 % (ref ?–5.7)
HDLC SERPL-MCNC: 69 MG/DL (ref 40–59)
LDLC SERPL CALC-MCNC: 137 MG/DL (ref ?–100)
NONHDLC SERPL-MCNC: 159 MG/DL (ref ?–130)
TRIGL SERPL-MCNC: 124 MG/DL (ref 30–149)
VIT D+METAB SERPL-MCNC: 37.1 NG/ML (ref 30–100)
VLDLC SERPL CALC-MCNC: 23 MG/DL (ref 0–30)

## 2022-11-07 PROCEDURE — 82306 VITAMIN D 25 HYDROXY: CPT

## 2022-11-07 PROCEDURE — 80061 LIPID PANEL: CPT

## 2022-11-07 PROCEDURE — 83036 HEMOGLOBIN GLYCOSYLATED A1C: CPT

## 2022-11-07 PROCEDURE — 36415 COLL VENOUS BLD VENIPUNCTURE: CPT

## 2022-11-10 LAB — ZINC SERUM: 80.4 UG/DL

## 2022-11-14 ENCOUNTER — TELEPHONE (OUTPATIENT)
Dept: INTERNAL MEDICINE CLINIC | Facility: CLINIC | Age: 77
End: 2022-11-14

## 2022-11-14 DIAGNOSIS — R73.09 ELEVATED GLUCOSE: ICD-10-CM

## 2022-11-14 DIAGNOSIS — M81.0 AGE-RELATED OSTEOPOROSIS WITHOUT CURRENT PATHOLOGICAL FRACTURE: ICD-10-CM

## 2022-11-14 DIAGNOSIS — E78.5 HYPERLIPIDEMIA, UNSPECIFIED HYPERLIPIDEMIA TYPE: Primary | ICD-10-CM

## 2022-11-14 NOTE — TELEPHONE ENCOUNTER
----- Message from HERB Null sent at 11/8/2022  8:46 AM CST -----  Vit D level normal now, Pt to take at least 2000 IU daily of Vit D to maintain the level. Recheck the Vit D level again in 6 months to make sure Pt is maintaining the level. Chol, Ldl and non Hdl all remain up, trigs improved. Has Pt been taking her Zetia daily? If yes, then working on her diet and exercise will help. Recheck lipids in 6 months. Hgba1c 6. This is up. Pt remains prediabetic, Pt to continue to work on her diet. Recheck again in 6 months.

## 2022-11-17 DIAGNOSIS — E78.5 HYPERLIPIDEMIA, UNSPECIFIED HYPERLIPIDEMIA TYPE: ICD-10-CM

## 2022-11-17 RX ORDER — EZETIMIBE 10 MG/1
10 TABLET ORAL NIGHTLY
Qty: 90 TABLET | Refills: 1 | Status: SHIPPED | OUTPATIENT
Start: 2022-11-17

## 2022-11-17 NOTE — TELEPHONE ENCOUNTER
Pt requesting refill - pt completed labs 11/7    ezetimibe 10 MG Oral Tab        [id number] [place], [place] - [address] [phone], [phone]

## 2022-12-19 DIAGNOSIS — G47.00 INSOMNIA, UNSPECIFIED TYPE: ICD-10-CM

## 2022-12-20 RX ORDER — BUPROPION HYDROCHLORIDE 150 MG/1
TABLET, EXTENDED RELEASE ORAL
Qty: 90 TABLET | Refills: 0 | Status: SHIPPED | OUTPATIENT
Start: 2022-12-20 | End: 2022-12-22

## 2022-12-20 RX ORDER — TEMAZEPAM 30 MG/1
CAPSULE ORAL
Qty: 90 CAPSULE | Refills: 0 | Status: SHIPPED | OUTPATIENT
Start: 2022-12-20

## 2022-12-20 NOTE — TELEPHONE ENCOUNTER
Temazepam 30 mg  Filled 12-20-21  Qty 90  0 refills  No upcoming appt. LOV 5-17-22 SM    Bupropion  mg  Filled 9-2-22  Qty 90  0 refills  No upcoming appt.   LOV 5-17-22 SM

## 2022-12-22 RX ORDER — BUPROPION HYDROCHLORIDE 150 MG/1
TABLET, EXTENDED RELEASE ORAL
Qty: 90 TABLET | Refills: 0 | Status: SHIPPED | OUTPATIENT
Start: 2022-12-22

## 2022-12-23 DIAGNOSIS — I10 ESSENTIAL HYPERTENSION: ICD-10-CM

## 2022-12-23 RX ORDER — METOPROLOL TARTRATE 50 MG/1
TABLET, FILM COATED ORAL
Qty: 90 TABLET | Refills: 0 | Status: SHIPPED | OUTPATIENT
Start: 2022-12-23

## 2023-02-02 ENCOUNTER — TELEPHONE (OUTPATIENT)
Dept: INTERNAL MEDICINE CLINIC | Facility: CLINIC | Age: 78
End: 2023-02-02

## 2023-02-02 NOTE — TELEPHONE ENCOUNTER
I usually send Pt's with hearing issues to Dr. Ronn Ramos because he has an audiologist in his office

## 2023-02-02 NOTE — TELEPHONE ENCOUNTER
Pt requesting recs for an audiologist.    Pt states if we cannot reach her, to send a mcm with the info

## 2023-02-03 NOTE — TELEPHONE ENCOUNTER
Left detailed message (ok per verbal), gave Dr. Gilles Gonzalez contact info. Also stated I will send a mcm with info.     mcm sent.

## 2023-02-14 ENCOUNTER — OFFICE VISIT (OUTPATIENT)
Dept: INTERNAL MEDICINE CLINIC | Facility: CLINIC | Age: 78
End: 2023-02-14
Payer: MEDICARE

## 2023-02-14 VITALS
HEIGHT: 61 IN | WEIGHT: 148 LBS | SYSTOLIC BLOOD PRESSURE: 116 MMHG | BODY MASS INDEX: 27.94 KG/M2 | DIASTOLIC BLOOD PRESSURE: 74 MMHG | TEMPERATURE: 99 F | HEART RATE: 58 BPM | RESPIRATION RATE: 16 BRPM | OXYGEN SATURATION: 96 %

## 2023-02-14 DIAGNOSIS — K52.9 GASTROENTERITIS: Primary | ICD-10-CM

## 2023-02-14 PROCEDURE — 3074F SYST BP LT 130 MM HG: CPT | Performed by: FAMILY MEDICINE

## 2023-02-14 PROCEDURE — 3078F DIAST BP <80 MM HG: CPT | Performed by: FAMILY MEDICINE

## 2023-02-14 PROCEDURE — 99213 OFFICE O/P EST LOW 20 MIN: CPT | Performed by: FAMILY MEDICINE

## 2023-02-14 PROCEDURE — 3008F BODY MASS INDEX DOCD: CPT | Performed by: FAMILY MEDICINE

## 2023-02-14 RX ORDER — ONDANSETRON 4 MG/1
4 TABLET, FILM COATED ORAL EVERY 8 HOURS PRN
Qty: 20 TABLET | Refills: 0 | Status: SHIPPED | OUTPATIENT
Start: 2023-02-14

## 2023-02-16 ENCOUNTER — TELEPHONE (OUTPATIENT)
Dept: INTERNAL MEDICINE CLINIC | Facility: CLINIC | Age: 78
End: 2023-02-16

## 2023-02-16 DIAGNOSIS — R19.7 DIARRHEA, UNSPECIFIED TYPE: Primary | ICD-10-CM

## 2023-02-16 NOTE — TELEPHONE ENCOUNTER
Pt called following up from her visit on 2/14. Pt states the nausea has gone away and the medication worked immediately. Pt is c/o persistent diarrhea. Pt states she woke up at 3am with gas and diarrhea, the diarrhea continued on and off for 4 hours and hindered her sleep. Pt states she took Imodium and after the 3rd pill it finally worked. Pt is worried something is wrong, requesting to speak with a nurse for further recs.

## 2023-02-16 NOTE — TELEPHONE ENCOUNTER
I agree with your advice. It takes awhile to get over a stomach virus. If the diarrhea continues I will order stool cultures/tests on her.

## 2023-02-16 NOTE — TELEPHONE ENCOUNTER
Spoke with pt. Pt stated zofran has helped with dry heaving and Nausea. But last night she woke up around 3 am with bloating and gas. Pt stated she had about 5 episodes of diarrhea through the night. Took 3 imodiums since last night. Pt states she was finally able to sleep from about 6 am to 11:30 am. Had small bites of rice at 1 pm today. So far no diarrhea. She's asking if this is normal? If she needs further testing? Advised pt that this is normal if she has a stomach virus. May need to let it run it's course. Advised to stay hydrated most importantly. Slowly re-introduce bland foods in. Continue with imodium if diarrhea re-occurs and to let us know . Will call her back if SM has further recs.

## 2023-02-20 NOTE — TELEPHONE ENCOUNTER
Spoke with pt. She said that over weekend stools had gotten a bit firmer. Was eating bland diet. Staying hydrated. Today she had 2 episodes of diarrhea again. watery stools, Lots of bloating and gas. She also states she feels low energy. Could be just from fighting virus/diarrhea. Informed pt SM is out of office, but will call her back tomorrow with any further recs. SM: please advise, previously you mentioned possible stool cultures/tests?

## 2023-02-20 NOTE — TELEPHONE ENCOUNTER
Lets do the stool tests just to make sure it is nothing infectious. If tests negative her bowels could just be irritated from a virus or the type of food she ate. It may take a week or two to resolve. Pt to keep us posted. Orders placed for Pt.

## 2023-02-20 NOTE — TELEPHONE ENCOUNTER
Patient calling back, because she was okay yesterday no diarrhea but today it came back. She took 2 immodium and it is not helping.

## 2023-02-28 ENCOUNTER — LAB ENCOUNTER (OUTPATIENT)
Dept: LAB | Age: 78
End: 2023-02-28
Attending: FAMILY MEDICINE
Payer: MEDICARE

## 2023-02-28 DIAGNOSIS — R19.7 DIARRHEA, UNSPECIFIED TYPE: ICD-10-CM

## 2023-02-28 LAB
CRYPTOSP AG STL QL IA: NEGATIVE
G LAMBLIA AG STL QL IA: NEGATIVE

## 2023-02-28 PROCEDURE — 87046 STOOL CULTR AEROBIC BACT EA: CPT

## 2023-02-28 PROCEDURE — 87272 CRYPTOSPORIDIUM AG IF: CPT

## 2023-02-28 PROCEDURE — 87329 GIARDIA AG IA: CPT

## 2023-02-28 PROCEDURE — 87045 FECES CULTURE AEROBIC BACT: CPT

## 2023-02-28 PROCEDURE — 87209 SMEAR COMPLEX STAIN: CPT

## 2023-02-28 PROCEDURE — 87427 SHIGA-LIKE TOXIN AG IA: CPT

## 2023-02-28 PROCEDURE — 87338 HPYLORI STOOL AG IA: CPT

## 2023-02-28 PROCEDURE — 87177 OVA AND PARASITES SMEARS: CPT

## 2023-03-02 ENCOUNTER — OFFICE VISIT (OUTPATIENT)
Facility: LOCATION | Age: 78
End: 2023-03-02
Payer: MEDICARE

## 2023-03-02 DIAGNOSIS — H93.291 ABNORMAL AUDITORY PERCEPTION OF RIGHT EAR: Primary | ICD-10-CM

## 2023-03-02 DIAGNOSIS — D33.3 RIGHT ACOUSTIC NEUROMA (HCC): Primary | ICD-10-CM

## 2023-03-02 PROCEDURE — 92567 TYMPANOMETRY: CPT | Performed by: AUDIOLOGIST

## 2023-03-02 PROCEDURE — 99203 OFFICE O/P NEW LOW 30 MIN: CPT | Performed by: OTOLARYNGOLOGY

## 2023-03-02 PROCEDURE — 92557 COMPREHENSIVE HEARING TEST: CPT | Performed by: AUDIOLOGIST

## 2023-03-02 NOTE — PROGRESS NOTES
Shahab Barlow was seen for an audiometric evaluation and tympanogram today. Referred back to physician. Hearing aid selection post medical clearance.     Luli Frias MS, CCC-A

## 2023-03-03 LAB — H PYLORI AG STL QL IA: NEGATIVE

## 2023-03-04 LAB — OVA AND PARASITE, FECAL INTERPRETATION: NEGATIVE

## 2023-03-10 ENCOUNTER — TELEPHONE (OUTPATIENT)
Dept: ADMINISTRATIVE | Age: 78
End: 2023-03-10

## 2023-03-10 DIAGNOSIS — R19.7 DIARRHEA, UNSPECIFIED TYPE: Primary | ICD-10-CM

## 2023-03-15 ENCOUNTER — HOSPITAL ENCOUNTER (OUTPATIENT)
Dept: MRI IMAGING | Facility: HOSPITAL | Age: 78
Discharge: HOME | End: 2023-03-15
Attending: OTOLARYNGOLOGY
Payer: MEDICARE

## 2023-03-15 DIAGNOSIS — D33.3 RIGHT ACOUSTIC NEUROMA (HCC): ICD-10-CM

## 2023-03-15 PROCEDURE — A9575 INJ GADOTERATE MEGLUMI 0.1ML: HCPCS | Performed by: OTOLARYNGOLOGY

## 2023-03-15 PROCEDURE — 70553 MRI BRAIN STEM W/O & W/DYE: CPT | Performed by: OTOLARYNGOLOGY

## 2023-03-15 RX ORDER — GADOTERATE MEGLUMINE 376.9 MG/ML
20 INJECTION INTRAVENOUS
Status: DISCONTINUED | OUTPATIENT
Start: 2023-03-15 | End: 2023-03-15

## 2023-03-15 RX ORDER — GADOTERATE MEGLUMINE 376.9 MG/ML
15 INJECTION INTRAVENOUS
Status: COMPLETED | OUTPATIENT
Start: 2023-03-15 | End: 2023-03-15

## 2023-03-15 RX ADMIN — GADOTERATE MEGLUMINE 14 ML: 376.9 INJECTION INTRAVENOUS at 17:31:00

## 2023-03-25 RX ORDER — BUPROPION HYDROCHLORIDE 150 MG/1
TABLET, EXTENDED RELEASE ORAL
Qty: 90 TABLET | Refills: 0 | Status: SHIPPED | OUTPATIENT
Start: 2023-03-25

## 2023-03-26 DIAGNOSIS — I10 ESSENTIAL HYPERTENSION: ICD-10-CM

## 2023-03-26 PROBLEM — E55.9 VITAMIN D DEFICIENCY: Status: ACTIVE | Noted: 2023-03-26

## 2023-03-26 PROBLEM — D23.9 DERMATOFIBROMA: Status: RESOLVED | Noted: 2020-10-01 | Resolved: 2023-03-26

## 2023-03-26 PROBLEM — D33.3 RIGHT ACOUSTIC NEUROMA (HCC): Status: ACTIVE | Noted: 2023-03-26

## 2023-03-26 PROBLEM — H25.9 AGE-RELATED CATARACT: Status: RESOLVED | Noted: 2020-10-01 | Resolved: 2023-03-26

## 2023-03-26 PROBLEM — Z96.1 PSEUDOPHAKIA OF LEFT EYE: Status: ACTIVE | Noted: 2023-03-26

## 2023-03-27 RX ORDER — METOPROLOL TARTRATE 50 MG/1
TABLET, FILM COATED ORAL
Qty: 90 TABLET | Refills: 0 | Status: SHIPPED | OUTPATIENT
Start: 2023-03-27

## 2023-03-28 ENCOUNTER — LAB ENCOUNTER (OUTPATIENT)
Dept: LAB | Age: 78
End: 2023-03-28
Attending: FAMILY MEDICINE
Payer: MEDICARE

## 2023-03-28 ENCOUNTER — OFFICE VISIT (OUTPATIENT)
Dept: INTERNAL MEDICINE CLINIC | Facility: CLINIC | Age: 78
End: 2023-03-28
Payer: MEDICARE

## 2023-03-28 VITALS
HEIGHT: 61.25 IN | SYSTOLIC BLOOD PRESSURE: 126 MMHG | OXYGEN SATURATION: 97 % | RESPIRATION RATE: 12 BRPM | TEMPERATURE: 99 F | DIASTOLIC BLOOD PRESSURE: 62 MMHG | WEIGHT: 150.38 LBS | HEART RATE: 76 BPM | BODY MASS INDEX: 28.03 KG/M2

## 2023-03-28 DIAGNOSIS — F41.1 GENERALIZED ANXIETY DISORDER: ICD-10-CM

## 2023-03-28 DIAGNOSIS — D33.3 RIGHT ACOUSTIC NEUROMA (HCC): ICD-10-CM

## 2023-03-28 DIAGNOSIS — Z96.1 PSEUDOPHAKIA OF LEFT EYE: ICD-10-CM

## 2023-03-28 DIAGNOSIS — G47.00 INSOMNIA, UNSPECIFIED TYPE: ICD-10-CM

## 2023-03-28 DIAGNOSIS — E55.9 VITAMIN D DEFICIENCY: ICD-10-CM

## 2023-03-28 DIAGNOSIS — R19.7 DIARRHEA, UNSPECIFIED TYPE: ICD-10-CM

## 2023-03-28 DIAGNOSIS — M81.0 AGE-RELATED OSTEOPOROSIS WITHOUT CURRENT PATHOLOGICAL FRACTURE: ICD-10-CM

## 2023-03-28 DIAGNOSIS — K44.9 DIAPHRAGMATIC HERNIA WITHOUT OBSTRUCTION AND WITHOUT GANGRENE: ICD-10-CM

## 2023-03-28 DIAGNOSIS — K57.30 DIVERTICULOSIS OF COLON: ICD-10-CM

## 2023-03-28 DIAGNOSIS — I70.0 ATHEROSCLEROSIS OF ABDOMINAL AORTA (HCC): ICD-10-CM

## 2023-03-28 DIAGNOSIS — G43.009 MIGRAINE WITHOUT AURA AND WITHOUT STATUS MIGRAINOSUS, NOT INTRACTABLE: ICD-10-CM

## 2023-03-28 DIAGNOSIS — Z00.00 ENCOUNTER FOR ANNUAL HEALTH EXAMINATION: Primary | ICD-10-CM

## 2023-03-28 DIAGNOSIS — K21.00 GASTROESOPHAGEAL REFLUX DISEASE WITH ESOPHAGITIS WITHOUT HEMORRHAGE: ICD-10-CM

## 2023-03-28 DIAGNOSIS — I27.20 PULMONARY HYPERTENSION (HCC): ICD-10-CM

## 2023-03-28 DIAGNOSIS — I10 PRIMARY HYPERTENSION: ICD-10-CM

## 2023-03-28 DIAGNOSIS — E78.5 HYPERLIPIDEMIA, UNSPECIFIED HYPERLIPIDEMIA TYPE: ICD-10-CM

## 2023-03-28 DIAGNOSIS — N18.31 STAGE 3A CHRONIC KIDNEY DISEASE (HCC): ICD-10-CM

## 2023-03-28 DIAGNOSIS — L50.1 IDIOPATHIC URTICARIA: ICD-10-CM

## 2023-03-28 DIAGNOSIS — N95.2 POSTMENOPAUSAL ATROPHIC VAGINITIS: ICD-10-CM

## 2023-03-28 DIAGNOSIS — J30.9 ALLERGIC RHINITIS, UNSPECIFIED SEASONALITY, UNSPECIFIED TRIGGER: ICD-10-CM

## 2023-03-28 DIAGNOSIS — M19.012 PRIMARY OSTEOARTHRITIS OF LEFT SHOULDER: ICD-10-CM

## 2023-03-28 DIAGNOSIS — F33.41 DEPRESSION, MAJOR, RECURRENT, IN PARTIAL REMISSION (HCC): ICD-10-CM

## 2023-03-28 DIAGNOSIS — H81.10 BENIGN PAROXYSMAL POSITIONAL VERTIGO, UNSPECIFIED LATERALITY: ICD-10-CM

## 2023-03-28 DIAGNOSIS — L30.8 PERIVASCULAR DERMATITIS: ICD-10-CM

## 2023-03-28 LAB
BASOPHILS # BLD AUTO: 0.06 X10(3) UL (ref 0–0.2)
BASOPHILS NFR BLD AUTO: 0.7 %
EOSINOPHIL # BLD AUTO: 0.42 X10(3) UL (ref 0–0.7)
EOSINOPHIL NFR BLD AUTO: 4.7 %
ERYTHROCYTE [DISTWIDTH] IN BLOOD BY AUTOMATED COUNT: 13.4 %
HCT VFR BLD AUTO: 41.1 %
HGB BLD-MCNC: 13.6 G/DL
IMM GRANULOCYTES # BLD AUTO: 0.03 X10(3) UL (ref 0–1)
IMM GRANULOCYTES NFR BLD: 0.3 %
LYMPHOCYTES # BLD AUTO: 2.55 X10(3) UL (ref 1–4)
LYMPHOCYTES NFR BLD AUTO: 28.5 %
MCH RBC QN AUTO: 29.8 PG (ref 26–34)
MCHC RBC AUTO-ENTMCNC: 33.1 G/DL (ref 31–37)
MCV RBC AUTO: 90.1 FL
MONOCYTES # BLD AUTO: 0.85 X10(3) UL (ref 0.1–1)
MONOCYTES NFR BLD AUTO: 9.5 %
NEUTROPHILS # BLD AUTO: 5.03 X10 (3) UL (ref 1.5–7.7)
NEUTROPHILS # BLD AUTO: 5.03 X10(3) UL (ref 1.5–7.7)
NEUTROPHILS NFR BLD AUTO: 56.3 %
PLATELET # BLD AUTO: 278 10(3)UL (ref 150–450)
RBC # BLD AUTO: 4.56 X10(6)UL
WBC # BLD AUTO: 8.9 X10(3) UL (ref 4–11)

## 2023-03-28 PROCEDURE — 99397 PER PM REEVAL EST PAT 65+ YR: CPT | Performed by: FAMILY MEDICINE

## 2023-03-28 PROCEDURE — 96160 PT-FOCUSED HLTH RISK ASSMT: CPT | Performed by: FAMILY MEDICINE

## 2023-03-28 PROCEDURE — 1125F AMNT PAIN NOTED PAIN PRSNT: CPT | Performed by: FAMILY MEDICINE

## 2023-03-28 PROCEDURE — G0439 PPPS, SUBSEQ VISIT: HCPCS | Performed by: FAMILY MEDICINE

## 2023-03-28 PROCEDURE — 3078F DIAST BP <80 MM HG: CPT | Performed by: FAMILY MEDICINE

## 2023-03-28 PROCEDURE — 3008F BODY MASS INDEX DOCD: CPT | Performed by: FAMILY MEDICINE

## 2023-03-28 PROCEDURE — 3074F SYST BP LT 130 MM HG: CPT | Performed by: FAMILY MEDICINE

## 2023-03-28 PROCEDURE — 85025 COMPLETE CBC W/AUTO DIFF WBC: CPT

## 2023-03-28 PROCEDURE — 36415 COLL VENOUS BLD VENIPUNCTURE: CPT

## 2023-06-16 ENCOUNTER — LAB ENCOUNTER (OUTPATIENT)
Dept: LAB | Age: 78
End: 2023-06-16
Attending: FAMILY MEDICINE
Payer: MEDICARE

## 2023-06-16 DIAGNOSIS — E78.5 HYPERLIPIDEMIA, UNSPECIFIED HYPERLIPIDEMIA TYPE: ICD-10-CM

## 2023-06-16 DIAGNOSIS — F41.1 GENERALIZED ANXIETY DISORDER: ICD-10-CM

## 2023-06-16 DIAGNOSIS — I10 PRIMARY HYPERTENSION: ICD-10-CM

## 2023-06-16 DIAGNOSIS — N18.31 STAGE 3A CHRONIC KIDNEY DISEASE (HCC): ICD-10-CM

## 2023-06-16 DIAGNOSIS — R73.09 ELEVATED GLUCOSE: ICD-10-CM

## 2023-06-16 DIAGNOSIS — M81.0 AGE-RELATED OSTEOPOROSIS WITHOUT CURRENT PATHOLOGICAL FRACTURE: ICD-10-CM

## 2023-06-16 LAB
ALBUMIN SERPL-MCNC: 3.5 G/DL (ref 3.4–5)
ALBUMIN/GLOB SERPL: 1.1 {RATIO} (ref 1–2)
ALP LIVER SERPL-CCNC: 94 U/L
ALT SERPL-CCNC: 25 U/L
ANION GAP SERPL CALC-SCNC: 6 MMOL/L (ref 0–18)
AST SERPL-CCNC: 22 U/L (ref 15–37)
BILIRUB SERPL-MCNC: 0.4 MG/DL (ref 0.1–2)
BUN BLD-MCNC: 17 MG/DL (ref 7–18)
CALCIUM BLD-MCNC: 8.6 MG/DL (ref 8.5–10.1)
CHLORIDE SERPL-SCNC: 113 MMOL/L (ref 98–112)
CHOLEST SERPL-MCNC: 199 MG/DL (ref ?–200)
CO2 SERPL-SCNC: 22 MMOL/L (ref 21–32)
CREAT BLD-MCNC: 1 MG/DL
EST. AVERAGE GLUCOSE BLD GHB EST-MCNC: 123 MG/DL (ref 68–126)
FASTING PATIENT LIPID ANSWER: YES
FASTING STATUS PATIENT QL REPORTED: YES
GFR SERPLBLD BASED ON 1.73 SQ M-ARVRAT: 58 ML/MIN/1.73M2 (ref 60–?)
GLOBULIN PLAS-MCNC: 3.3 G/DL (ref 2.8–4.4)
GLUCOSE BLD-MCNC: 105 MG/DL (ref 70–99)
HBA1C MFR BLD: 5.9 % (ref ?–5.7)
HDLC SERPL-MCNC: 64 MG/DL (ref 40–59)
LDLC SERPL CALC-MCNC: 110 MG/DL (ref ?–100)
NONHDLC SERPL-MCNC: 135 MG/DL (ref ?–130)
OSMOLALITY SERPL CALC.SUM OF ELEC: 294 MOSM/KG (ref 275–295)
POTASSIUM SERPL-SCNC: 4.5 MMOL/L (ref 3.5–5.1)
PROT SERPL-MCNC: 6.8 G/DL (ref 6.4–8.2)
SODIUM SERPL-SCNC: 141 MMOL/L (ref 136–145)
TRIGL SERPL-MCNC: 143 MG/DL (ref 30–149)
TSI SER-ACNC: 2.58 MIU/ML (ref 0.36–3.74)
VIT D+METAB SERPL-MCNC: 26.8 NG/ML (ref 30–100)
VLDLC SERPL CALC-MCNC: 24 MG/DL (ref 0–30)

## 2023-06-16 PROCEDURE — 83036 HEMOGLOBIN GLYCOSYLATED A1C: CPT

## 2023-06-16 PROCEDURE — 82306 VITAMIN D 25 HYDROXY: CPT

## 2023-06-16 PROCEDURE — 80061 LIPID PANEL: CPT

## 2023-06-16 PROCEDURE — 84443 ASSAY THYROID STIM HORMONE: CPT

## 2023-06-16 PROCEDURE — 80053 COMPREHEN METABOLIC PANEL: CPT

## 2023-06-16 PROCEDURE — 36415 COLL VENOUS BLD VENIPUNCTURE: CPT

## 2023-06-20 DIAGNOSIS — E55.9 VITAMIN D DEFICIENCY: Primary | ICD-10-CM

## 2023-06-20 DIAGNOSIS — I10 ESSENTIAL HYPERTENSION: ICD-10-CM

## 2023-06-20 RX ORDER — METOPROLOL TARTRATE 50 MG/1
TABLET, FILM COATED ORAL
Qty: 90 TABLET | Refills: 0 | Status: SHIPPED | OUTPATIENT
Start: 2023-06-20

## 2023-06-20 RX ORDER — ERGOCALCIFEROL 1.25 MG/1
50000 CAPSULE ORAL WEEKLY
Qty: 12 CAPSULE | Refills: 1 | Status: SHIPPED | OUTPATIENT
Start: 2023-06-20 | End: 2023-11-29

## 2023-06-22 RX ORDER — BUPROPION HYDROCHLORIDE 150 MG/1
TABLET, EXTENDED RELEASE ORAL
Qty: 90 TABLET | Refills: 0 | Status: SHIPPED | OUTPATIENT
Start: 2023-06-22

## 2023-06-23 DIAGNOSIS — E78.5 HYPERLIPIDEMIA, UNSPECIFIED HYPERLIPIDEMIA TYPE: ICD-10-CM

## 2023-06-26 RX ORDER — EZETIMIBE 10 MG/1
TABLET ORAL
Qty: 30 TABLET | Refills: 0 | Status: SHIPPED | OUTPATIENT
Start: 2023-06-26

## 2023-06-26 NOTE — TELEPHONE ENCOUNTER
Requesting    Name from pharmacy: Ezetimibe 10 Mg Tab [facility]         Will file in chart as: EZETIMIBE 10 MG Oral Tab    Sig: TAKE ONE TABLET BY MOUTH EVERY EVENING    Disp: 30 tablet    Refills: 0    Start: 6/23/2023    Class: Normal    Non-formulary For: Hyperlipidemia, unspecified hyperlipidemia type    Last ordered: 1 month ago by Dante Junior MD Last refill: 5/17/2023    Rx #: [id number]    Cholesterol Medication Protocol Ztmixp3306/23/2023 11:13 PM   Protocol Details ALT < 80    ALT resulted within past year    Lipid panel within past 12 months    Appointment within past 12 or next 3 months        LOV: 3/28/2023  RTC: 6 months   Last Relevant Labs: 6/16/2023  Filled: 5/17/2023 #30 with 0 refills    Future Appointments   Date Time Provider Teodoro Almaraz   7/31/2023  2:00 PM Naomi Cheng MD [facility]

## 2023-07-20 DIAGNOSIS — E78.5 HYPERLIPIDEMIA, UNSPECIFIED HYPERLIPIDEMIA TYPE: ICD-10-CM

## 2023-07-21 RX ORDER — EZETIMIBE 10 MG/1
TABLET ORAL
Qty: 30 TABLET | Refills: 0 | Status: SHIPPED | OUTPATIENT
Start: 2023-07-21

## 2023-07-21 NOTE — TELEPHONE ENCOUNTER
Requesting   Name from pharmacy: Ezetimibe 10 Mg Tab [facility]          Will file in chart as: EZETIMIBE 10 MG Oral Tab    Sig: TAKE ONE TABLET BY MOUTH EVERY EVENING    Disp: 30 tablet    Refills: 0    Start: 7/20/2023    Class: Normal    Non-formulary For: Hyperlipidemia, unspecified hyperlipidemia type    Last ordered: 3 weeks ago by Ancelmo Leonard MD Last refill: 6/26/2023    Rx #: [id number]    Cholesterol Medication Protocol [id number]/20/2023 11:07 PM   Protocol Details ALT < 80    ALT resulted within past year    Lipid panel within past 12 months    Appointment within past 12 or next 3 months        LOV: 3/28/2023  RTC: 6 months   Last Relevant Labs: 6/16/2023  Filled: 6/26/2023 #30 with 0 refills    Future Appointments   Date Time Provider Teodoro Almaraz   7/31/2023  2:00 PM Colt Walsh MD [facility]

## 2023-08-18 DIAGNOSIS — E78.5 HYPERLIPIDEMIA, UNSPECIFIED HYPERLIPIDEMIA TYPE: ICD-10-CM

## 2023-08-20 RX ORDER — EZETIMIBE 10 MG/1
10 TABLET ORAL EVERY EVENING
Qty: 30 TABLET | Refills: 0 | Status: SHIPPED | OUTPATIENT
Start: 2023-08-20

## 2023-09-15 ENCOUNTER — APPOINTMENT (OUTPATIENT)
Dept: CT IMAGING | Age: 78
End: 2023-09-15
Attending: EMERGENCY MEDICINE
Payer: MEDICARE

## 2023-09-15 ENCOUNTER — HOSPITAL ENCOUNTER (OUTPATIENT)
Age: 78
Discharge: HOME | End: 2023-09-15
Attending: EMERGENCY MEDICINE
Payer: MEDICARE

## 2023-09-15 VITALS
WEIGHT: 145 LBS | BODY MASS INDEX: 27 KG/M2 | HEART RATE: 68 BPM | DIASTOLIC BLOOD PRESSURE: 62 MMHG | RESPIRATION RATE: 18 BRPM | SYSTOLIC BLOOD PRESSURE: 129 MMHG | TEMPERATURE: 99 F | OXYGEN SATURATION: 97 %

## 2023-09-15 DIAGNOSIS — K57.92 ACUTE DIVERTICULITIS: Primary | ICD-10-CM

## 2023-09-15 LAB
#MXD IC: 1.3 X10ˆ3/UL (ref 0.1–1)
BILIRUB UR QL STRIP: NEGATIVE
BUN BLD-MCNC: 17 MG/DL (ref 7–18)
CHLORIDE BLD-SCNC: 106 MMOL/L (ref 98–112)
CLARITY UR: CLEAR
CO2 BLD-SCNC: 22 MMOL/L (ref 21–32)
COLOR UR: YELLOW
CREAT BLD-MCNC: 1 MG/DL
EGFRCR SERPLBLD CKD-EPI 2021: 58 ML/MIN/1.73M2 (ref 60–?)
GLUCOSE BLD-MCNC: 115 MG/DL (ref 70–99)
GLUCOSE UR STRIP-MCNC: NEGATIVE MG/DL
HCT VFR BLD AUTO: 44.7 %
HCT VFR BLD CALC: 46 %
HGB BLD-MCNC: 14.2 G/DL
ISTAT IONIZED CALCIUM FOR CHEM 8: 1.2 MMOL/L (ref 1.12–1.32)
KETONES UR STRIP-MCNC: NEGATIVE MG/DL
LEUKOCYTE ESTERASE UR QL STRIP: NEGATIVE
LYMPHOCYTES # BLD AUTO: 2 X10ˆ3/UL (ref 1–4)
LYMPHOCYTES NFR BLD AUTO: 16.7 %
MCH RBC QN AUTO: 29.6 PG (ref 26–34)
MCHC RBC AUTO-ENTMCNC: 31.8 G/DL (ref 31–37)
MCV RBC AUTO: 93.3 FL (ref 80–100)
MIXED CELL %: 11.1 %
NEUTROPHILS # BLD AUTO: 8.6 X10ˆ3/UL (ref 1.5–7.7)
NEUTROPHILS NFR BLD AUTO: 72.2 %
NITRITE UR QL STRIP: NEGATIVE
PH UR STRIP: 6 [PH]
PLATELET # BLD AUTO: 281 X10ˆ3/UL (ref 150–450)
POTASSIUM BLD-SCNC: 4.4 MMOL/L (ref 3.6–5.1)
PROT UR STRIP-MCNC: NEGATIVE MG/DL
RBC # BLD AUTO: 4.79 X10ˆ6/UL
SODIUM BLD-SCNC: 140 MMOL/L (ref 136–145)
SP GR UR STRIP: 1.02
UROBILINOGEN UR STRIP-ACNC: <2 MG/DL
WBC # BLD AUTO: 11.9 X10ˆ3/UL (ref 4–11)

## 2023-09-15 PROCEDURE — 74177 CT ABD & PELVIS W/CONTRAST: CPT | Performed by: EMERGENCY MEDICINE

## 2023-09-15 PROCEDURE — 81002 URINALYSIS NONAUTO W/O SCOPE: CPT

## 2023-09-15 PROCEDURE — 80047 BASIC METABLC PNL IONIZED CA: CPT

## 2023-09-15 PROCEDURE — 36415 COLL VENOUS BLD VENIPUNCTURE: CPT

## 2023-09-15 PROCEDURE — 99214 OFFICE O/P EST MOD 30 MIN: CPT

## 2023-09-15 PROCEDURE — 85025 COMPLETE CBC W/AUTO DIFF WBC: CPT | Performed by: EMERGENCY MEDICINE

## 2023-09-15 RX ORDER — CIPROFLOXACIN 500 MG/1
500 TABLET, FILM COATED ORAL 2 TIMES DAILY
Qty: 20 TABLET | Refills: 0 | Status: SHIPPED | OUTPATIENT
Start: 2023-09-15 | End: 2023-09-25

## 2023-09-15 RX ORDER — DICYCLOMINE HCL 20 MG
20 TABLET ORAL 4 TIMES DAILY PRN
Qty: 20 TABLET | Refills: 0 | Status: SHIPPED | OUTPATIENT
Start: 2023-09-15 | End: 2023-09-26

## 2023-09-15 RX ORDER — METRONIDAZOLE 500 MG/1
500 TABLET ORAL 3 TIMES DAILY
Qty: 30 TABLET | Refills: 0 | Status: SHIPPED | OUTPATIENT
Start: 2023-09-15 | End: 2023-09-25

## 2023-09-15 NOTE — DISCHARGE INSTRUCTIONS
Motrin for pain at home  Clear liquid diet only for next three days  Return to the ER if symptoms worsen or if any other problems arise

## 2023-09-15 NOTE — ED INITIAL ASSESSMENT (HPI)
Pt c/o generalized abd pain since Wed, worse since yesterday. Intermittent achy sensation. Denies any n/v. Denies fever. Last bm today. States having frequent bm's since yesterday.

## 2023-09-18 DIAGNOSIS — I10 ESSENTIAL HYPERTENSION: ICD-10-CM

## 2023-09-19 RX ORDER — METOPROLOL TARTRATE 50 MG/1
TABLET, FILM COATED ORAL
Qty: 90 TABLET | Refills: 0 | Status: SHIPPED | OUTPATIENT
Start: 2023-09-19

## 2023-09-22 NOTE — TELEPHONE ENCOUNTER
Spoke to pt. Is aware she needs a 6 month f/u appt. For her medication, WCB. Bupropion  mg  Filled 6-22-23  Qty 90  0 refills  Future Appointments   Date Time Provider Teodoro Almaraz   1/29/2024  2:30 PM Arian Mcclain MD [facility]   LOV 3-28-23 SM  RTC 6 mo.

## 2023-09-26 RX ORDER — BUPROPION HYDROCHLORIDE 150 MG/1
TABLET, EXTENDED RELEASE ORAL
Qty: 90 TABLET | Refills: 0 | OUTPATIENT
Start: 2023-09-26

## 2023-09-26 NOTE — TELEPHONE ENCOUNTER
Future Appointments   Date Time Provider Teodoro Rufina   9/26/2023  2:30 PM HERB Ramirez [facility] [place]   1/29/2024  2:30 PM Naomi Cheng MD [facility]

## 2023-10-11 NOTE — MR AVS SNAPSHOT
After Visit Summary   1/31/2017    Parminder Angel    MRN: [id number]           Diagnoses this Visit     Idiopathic urticaria    -  Primary       Allergies     Ampicillin-Sulbactam Sodium Rash    Darvocet [Propoxyphene N-Apap] Other (See Comm Needs apt to discuss symptoms and what testing would be appropriate. VV OK.   If a lab draw is part of your appointment, please arrive 15 minutes early. MyChart     Visit [facility]  You can access your Healthy Labshart to more actively manage your health care and view more details from this visit by going to [email or web address]

## 2023-10-16 ENCOUNTER — TELEPHONE (OUTPATIENT)
Dept: INTERNAL MEDICINE CLINIC | Facility: CLINIC | Age: 78
End: 2023-10-16

## 2023-10-16 NOTE — TELEPHONE ENCOUNTER
Incoming fax from [facility]     Patients Mammogram done on 10/11/2023    Benign findings;  There is no evidence of malignancy    Repeat in 1 year        Epic updated and placed in TO in basket for review

## 2023-11-30 DIAGNOSIS — E55.9 VITAMIN D DEFICIENCY: ICD-10-CM

## 2023-11-30 RX ORDER — ERGOCALCIFEROL 1.25 MG/1
50000 CAPSULE ORAL WEEKLY
Qty: 12 CAPSULE | Refills: 0 | OUTPATIENT
Start: 2023-11-30

## 2023-11-30 NOTE — TELEPHONE ENCOUNTER
Ergocalciferol   Filled 6-20-23  Qty 12  1 refill  Future Appointments   Date Time Provider Department Center   1/29/2024  2:30 PM Wily Johnson MD [facility]   LOV 9-26-23 TO  Labs 6-16-23 Vit D

## 2023-12-14 ENCOUNTER — HOSPITAL ENCOUNTER (OUTPATIENT)
Age: 78
Discharge: HOME | End: 2023-12-14
Payer: MEDICARE

## 2023-12-14 ENCOUNTER — APPOINTMENT (OUTPATIENT)
Dept: GENERAL RADIOLOGY | Age: 78
End: 2023-12-14
Attending: PHYSICIAN ASSISTANT
Payer: MEDICARE

## 2023-12-14 VITALS
TEMPERATURE: 97 F | BODY MASS INDEX: 27.38 KG/M2 | RESPIRATION RATE: 16 BRPM | SYSTOLIC BLOOD PRESSURE: 139 MMHG | OXYGEN SATURATION: 98 % | WEIGHT: 145 LBS | HEIGHT: 61 IN | HEART RATE: 58 BPM | DIASTOLIC BLOOD PRESSURE: 70 MMHG

## 2023-12-14 DIAGNOSIS — S63.633A SPRAIN OF INTERPHALANGEAL JOINT OF LEFT MIDDLE FINGER, INITIAL ENCOUNTER: Primary | ICD-10-CM

## 2023-12-14 PROCEDURE — 99213 OFFICE O/P EST LOW 20 MIN: CPT

## 2023-12-14 PROCEDURE — 73130 X-RAY EXAM OF HAND: CPT | Performed by: PHYSICIAN ASSISTANT
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
## 2023-12-16 DIAGNOSIS — I10 ESSENTIAL HYPERTENSION: ICD-10-CM

## 2023-12-17 RX ORDER — METOPROLOL TARTRATE 50 MG/1
50 TABLET, FILM COATED ORAL DAILY
Qty: 90 TABLET | Refills: 0 | Status: SHIPPED | OUTPATIENT
Start: 2023-12-17

## 2024-01-04 DIAGNOSIS — G47.00 INSOMNIA, UNSPECIFIED TYPE: ICD-10-CM

## 2024-01-05 ENCOUNTER — LAB ENCOUNTER (OUTPATIENT)
Dept: LAB | Age: 79
End: 2024-01-05
Attending: FAMILY MEDICINE
Payer: MEDICARE

## 2024-01-05 DIAGNOSIS — E78.5 HYPERLIPIDEMIA, UNSPECIFIED HYPERLIPIDEMIA TYPE: ICD-10-CM

## 2024-01-05 DIAGNOSIS — E55.9 VITAMIN D DEFICIENCY: ICD-10-CM

## 2024-01-05 DIAGNOSIS — I10 PRIMARY HYPERTENSION: ICD-10-CM

## 2024-01-05 DIAGNOSIS — R73.09 ELEVATED GLUCOSE: ICD-10-CM

## 2024-01-05 LAB
ALBUMIN SERPL-MCNC: 3.8 G/DL (ref 3.4–5)
ALBUMIN/GLOB SERPL: 1.3 {RATIO} (ref 1–2)
ALP LIVER SERPL-CCNC: 86 U/L
ALT SERPL-CCNC: 26 U/L
ANION GAP SERPL CALC-SCNC: 6 MMOL/L (ref 0–18)
AST SERPL-CCNC: 31 U/L (ref 15–37)
BILIRUB SERPL-MCNC: 0.6 MG/DL (ref 0.1–2)
BUN BLD-MCNC: 15 MG/DL (ref 9–23)
CALCIUM BLD-MCNC: 9 MG/DL (ref 8.5–10.1)
CHLORIDE SERPL-SCNC: 112 MMOL/L (ref 98–112)
CHOLEST SERPL-MCNC: 218 MG/DL (ref ?–200)
CO2 SERPL-SCNC: 22 MMOL/L (ref 21–32)
CREAT BLD-MCNC: 1.09 MG/DL
EGFRCR SERPLBLD CKD-EPI 2021: 52 ML/MIN/1.73M2 (ref 60–?)
EST. AVERAGE GLUCOSE BLD GHB EST-MCNC: 126 MG/DL (ref 68–126)
FASTING PATIENT LIPID ANSWER: YES
FASTING STATUS PATIENT QL REPORTED: YES
GLOBULIN PLAS-MCNC: 2.9 G/DL (ref 2.8–4.4)
GLUCOSE BLD-MCNC: 107 MG/DL (ref 70–99)
HBA1C MFR BLD: 6 % (ref ?–5.7)
HDLC SERPL-MCNC: 71 MG/DL (ref 40–59)
LDLC SERPL CALC-MCNC: 128 MG/DL (ref ?–100)
NONHDLC SERPL-MCNC: 147 MG/DL (ref ?–130)
OSMOLALITY SERPL CALC.SUM OF ELEC: 291 MOSM/KG (ref 275–295)
POTASSIUM SERPL-SCNC: 4.4 MMOL/L (ref 3.5–5.1)
PROT SERPL-MCNC: 6.7 G/DL (ref 6.4–8.2)
SODIUM SERPL-SCNC: 140 MMOL/L (ref 136–145)
TRIGL SERPL-MCNC: 107 MG/DL (ref 30–149)
VIT D+METAB SERPL-MCNC: 49.8 NG/ML (ref 30–100)
VLDLC SERPL CALC-MCNC: 19 MG/DL (ref 0–30)

## 2024-01-05 PROCEDURE — 80061 LIPID PANEL: CPT

## 2024-01-05 PROCEDURE — 36415 COLL VENOUS BLD VENIPUNCTURE: CPT

## 2024-01-05 PROCEDURE — 82306 VITAMIN D 25 HYDROXY: CPT

## 2024-01-05 PROCEDURE — 83036 HEMOGLOBIN GLYCOSYLATED A1C: CPT

## 2024-01-05 PROCEDURE — 80053 COMPREHEN METABOLIC PANEL: CPT

## 2024-01-05 NOTE — TELEPHONE ENCOUNTER
LM for pt to return call to schedule MAS + 6 month f/u appt.    Temazepam 30 mg  Filled 12-20-22  Qty 90  0 refills  Future Appointments   Date Time Provider Department Center   1/29/2024  2:30 PM Wily Johnson MD [facility] 9-26-23

## 2024-01-11 ENCOUNTER — OFFICE VISIT (OUTPATIENT)
Dept: INTERNAL MEDICINE CLINIC | Facility: CLINIC | Age: 79
End: 2024-01-11
Payer: MEDICARE

## 2024-01-11 ENCOUNTER — LAB ENCOUNTER (OUTPATIENT)
Dept: LAB | Age: 79
End: 2024-01-11
Attending: FAMILY MEDICINE
Payer: MEDICARE

## 2024-01-11 VITALS
TEMPERATURE: 97 F | RESPIRATION RATE: 16 BRPM | OXYGEN SATURATION: 98 % | HEIGHT: 61 IN | HEART RATE: 80 BPM | WEIGHT: 155.63 LBS | BODY MASS INDEX: 29.38 KG/M2 | DIASTOLIC BLOOD PRESSURE: 68 MMHG | SYSTOLIC BLOOD PRESSURE: 102 MMHG

## 2024-01-11 DIAGNOSIS — E55.9 VITAMIN D DEFICIENCY: Primary | ICD-10-CM

## 2024-01-11 DIAGNOSIS — R00.2 HEART PALPITATIONS: ICD-10-CM

## 2024-01-11 DIAGNOSIS — R00.2 HEART PALPITATIONS: Primary | ICD-10-CM

## 2024-01-11 DIAGNOSIS — E55.9 VITAMIN D DEFICIENCY: ICD-10-CM

## 2024-01-11 DIAGNOSIS — E78.5 HYPERLIPIDEMIA, UNSPECIFIED HYPERLIPIDEMIA TYPE: ICD-10-CM

## 2024-01-11 DIAGNOSIS — K58.0 IRRITABLE BOWEL SYNDROME WITH DIARRHEA: ICD-10-CM

## 2024-01-11 DIAGNOSIS — G47.00 INSOMNIA, UNSPECIFIED TYPE: ICD-10-CM

## 2024-01-11 LAB
ATRIAL RATE: 67 BPM
BASOPHILS # BLD AUTO: 0.03 X10(3) UL (ref 0–0.2)
BASOPHILS NFR BLD AUTO: 0.3 %
EOSINOPHIL # BLD AUTO: 0.62 X10(3) UL (ref 0–0.7)
EOSINOPHIL NFR BLD AUTO: 5.8 %
ERYTHROCYTE [DISTWIDTH] IN BLOOD BY AUTOMATED COUNT: 12.4 %
HCT VFR BLD AUTO: 41.1 %
HGB BLD-MCNC: 14 G/DL
IMM GRANULOCYTES # BLD AUTO: 0.04 X10(3) UL (ref 0–1)
IMM GRANULOCYTES NFR BLD: 0.4 %
LYMPHOCYTES # BLD AUTO: 1.81 X10(3) UL (ref 1–4)
LYMPHOCYTES NFR BLD AUTO: 17 %
MAGNESIUM SERPL-MCNC: 1.9 MG/DL (ref 1.6–2.6)
MCH RBC QN AUTO: 31.2 PG (ref 26–34)
MCHC RBC AUTO-ENTMCNC: 34.1 G/DL (ref 31–37)
MCV RBC AUTO: 91.5 FL
MONOCYTES # BLD AUTO: 0.95 X10(3) UL (ref 0.1–1)
MONOCYTES NFR BLD AUTO: 8.9 %
NEUTROPHILS # BLD AUTO: 7.18 X10 (3) UL (ref 1.5–7.7)
NEUTROPHILS # BLD AUTO: 7.18 X10(3) UL (ref 1.5–7.7)
NEUTROPHILS NFR BLD AUTO: 67.6 %
P AXIS: 3 DEGREES
P-R INTERVAL: 194 MS
PLATELET # BLD AUTO: 277 10(3)UL (ref 150–450)
Q-T INTERVAL: 434 MS
QRS DURATION: 122 MS
QTC CALCULATION (BEZET): 458 MS
R AXIS: -50 DEGREES
RBC # BLD AUTO: 4.49 X10(6)UL
T AXIS: 77 DEGREES
TSI SER-ACNC: 1.97 MIU/ML (ref 0.36–3.74)
VENTRICULAR RATE: 67 BPM
VIT D+METAB SERPL-MCNC: 45.6 NG/ML (ref 30–100)
WBC # BLD AUTO: 10.6 X10(3) UL (ref 4–11)

## 2024-01-11 PROCEDURE — 84443 ASSAY THYROID STIM HORMONE: CPT

## 2024-01-11 PROCEDURE — 93000 ELECTROCARDIOGRAM COMPLETE: CPT | Performed by: FAMILY MEDICINE

## 2024-01-11 PROCEDURE — 36415 COLL VENOUS BLD VENIPUNCTURE: CPT

## 2024-01-11 PROCEDURE — 3078F DIAST BP <80 MM HG: CPT | Performed by: FAMILY MEDICINE

## 2024-01-11 PROCEDURE — 83735 ASSAY OF MAGNESIUM: CPT

## 2024-01-11 PROCEDURE — 3008F BODY MASS INDEX DOCD: CPT | Performed by: FAMILY MEDICINE

## 2024-01-11 PROCEDURE — 1160F RVW MEDS BY RX/DR IN RCRD: CPT | Performed by: FAMILY MEDICINE

## 2024-01-11 PROCEDURE — 3074F SYST BP LT 130 MM HG: CPT | Performed by: FAMILY MEDICINE

## 2024-01-11 PROCEDURE — 1159F MED LIST DOCD IN RCRD: CPT | Performed by: FAMILY MEDICINE

## 2024-01-11 PROCEDURE — 85025 COMPLETE CBC W/AUTO DIFF WBC: CPT

## 2024-01-11 PROCEDURE — 82306 VITAMIN D 25 HYDROXY: CPT

## 2024-01-11 PROCEDURE — 99214 OFFICE O/P EST MOD 30 MIN: CPT | Performed by: FAMILY MEDICINE

## 2024-01-11 RX ORDER — TEMAZEPAM 30 MG/1
30 CAPSULE ORAL NIGHTLY PRN
Qty: 90 CAPSULE | Refills: 0 | Status: SHIPPED | OUTPATIENT
Start: 2024-01-11

## 2024-01-11 RX ORDER — TEMAZEPAM 30 MG/1
30 CAPSULE ORAL NIGHTLY PRN
Qty: 90 CAPSULE | Refills: 0 | Status: CANCELLED | OUTPATIENT
Start: 2024-01-11

## 2024-01-11 RX ORDER — TEMAZEPAM 30 MG/1
CAPSULE ORAL
Qty: 90 CAPSULE | Refills: 0 | OUTPATIENT
Start: 2024-01-11

## 2024-01-11 RX ORDER — EZETIMIBE 10 MG/1
10 TABLET ORAL EVERY EVENING
Qty: 90 TABLET | Refills: 0 | Status: SHIPPED | OUTPATIENT
Start: 2024-01-11
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
## 2024-01-30 ENCOUNTER — OFFICE VISIT (OUTPATIENT)
Dept: INTERNAL MEDICINE CLINIC | Facility: CLINIC | Age: 79
End: 2024-01-30
Payer: MEDICARE

## 2024-01-30 VITALS
SYSTOLIC BLOOD PRESSURE: 122 MMHG | TEMPERATURE: 97 F | OXYGEN SATURATION: 99 % | BODY MASS INDEX: 29.38 KG/M2 | HEIGHT: 61 IN | RESPIRATION RATE: 16 BRPM | DIASTOLIC BLOOD PRESSURE: 76 MMHG | HEART RATE: 60 BPM | WEIGHT: 155.63 LBS

## 2024-01-30 DIAGNOSIS — F43.9 STRESS AT HOME: ICD-10-CM

## 2024-01-30 DIAGNOSIS — M26.609 TMJ (TEMPOROMANDIBULAR JOINT SYNDROME): Primary | ICD-10-CM

## 2024-01-30 PROCEDURE — 99214 OFFICE O/P EST MOD 30 MIN: CPT | Performed by: FAMILY MEDICINE

## 2024-01-30 PROCEDURE — 3078F DIAST BP <80 MM HG: CPT | Performed by: FAMILY MEDICINE

## 2024-01-30 PROCEDURE — 3074F SYST BP LT 130 MM HG: CPT | Performed by: FAMILY MEDICINE

## 2024-01-30 PROCEDURE — 1159F MED LIST DOCD IN RCRD: CPT | Performed by: FAMILY MEDICINE

## 2024-01-30 PROCEDURE — 1160F RVW MEDS BY RX/DR IN RCRD: CPT | Performed by: FAMILY MEDICINE

## 2024-01-30 PROCEDURE — 3008F BODY MASS INDEX DOCD: CPT | Performed by: FAMILY MEDICINE
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
## 2024-02-15 ENCOUNTER — PATIENT OUTREACH (OUTPATIENT)
Dept: INTERNAL MEDICINE CLINIC | Facility: CLINIC | Age: 79
End: 2024-02-15

## 2024-02-21 ENCOUNTER — HOSPITAL ENCOUNTER (OUTPATIENT)
Dept: CV DIAGNOSTICS | Facility: HOSPITAL | Age: 79
Discharge: HOME | End: 2024-02-21
Attending: FAMILY MEDICINE
Payer: MEDICARE

## 2024-02-21 DIAGNOSIS — R00.2 HEART PALPITATIONS: ICD-10-CM

## 2024-02-21 PROCEDURE — 93225 XTRNL ECG REC<48 HRS REC: CPT | Performed by: FAMILY MEDICINE

## 2024-02-21 PROCEDURE — 93226 XTRNL ECG REC<48 HR SCAN A/R: CPT | Performed by: FAMILY MEDICINE

## 2024-02-26 ENCOUNTER — OFFICE VISIT (OUTPATIENT)
Dept: INTERNAL MEDICINE CLINIC | Facility: CLINIC | Age: 79
End: 2024-02-26
Payer: MEDICARE

## 2024-02-26 VITALS
TEMPERATURE: 97 F | HEART RATE: 70 BPM | WEIGHT: 156 LBS | OXYGEN SATURATION: 98 % | DIASTOLIC BLOOD PRESSURE: 76 MMHG | BODY MASS INDEX: 29.45 KG/M2 | HEIGHT: 61 IN | SYSTOLIC BLOOD PRESSURE: 124 MMHG | RESPIRATION RATE: 16 BRPM

## 2024-02-26 DIAGNOSIS — N95.2 POSTMENOPAUSAL ATROPHIC VAGINITIS: ICD-10-CM

## 2024-02-26 DIAGNOSIS — K44.9 DIAPHRAGMATIC HERNIA WITHOUT OBSTRUCTION AND WITHOUT GANGRENE: ICD-10-CM

## 2024-02-26 DIAGNOSIS — Z96.1 PSEUDOPHAKIA OF LEFT EYE: ICD-10-CM

## 2024-02-26 DIAGNOSIS — F41.1 GENERALIZED ANXIETY DISORDER: ICD-10-CM

## 2024-02-26 DIAGNOSIS — I70.0 ATHEROSCLEROSIS OF ABDOMINAL AORTA (HCC): ICD-10-CM

## 2024-02-26 DIAGNOSIS — I10 ESSENTIAL HYPERTENSION: ICD-10-CM

## 2024-02-26 DIAGNOSIS — M19.012 PRIMARY OSTEOARTHRITIS OF LEFT SHOULDER: ICD-10-CM

## 2024-02-26 DIAGNOSIS — D33.3 RIGHT ACOUSTIC NEUROMA (HCC): ICD-10-CM

## 2024-02-26 DIAGNOSIS — I10 PRIMARY HYPERTENSION: ICD-10-CM

## 2024-02-26 DIAGNOSIS — M81.0 AGE-RELATED OSTEOPOROSIS WITHOUT CURRENT PATHOLOGICAL FRACTURE: ICD-10-CM

## 2024-02-26 DIAGNOSIS — K57.30 DIVERTICULOSIS OF COLON: ICD-10-CM

## 2024-02-26 DIAGNOSIS — K21.00 GASTROESOPHAGEAL REFLUX DISEASE WITH ESOPHAGITIS WITHOUT HEMORRHAGE: ICD-10-CM

## 2024-02-26 DIAGNOSIS — L50.1 IDIOPATHIC URTICARIA: ICD-10-CM

## 2024-02-26 DIAGNOSIS — Z00.00 ENCOUNTER FOR ANNUAL HEALTH EXAMINATION: ICD-10-CM

## 2024-02-26 DIAGNOSIS — F33.9 EPISODE OF RECURRENT MAJOR DEPRESSIVE DISORDER, UNSPECIFIED DEPRESSION EPISODE SEVERITY (HCC): ICD-10-CM

## 2024-02-26 DIAGNOSIS — G47.00 INSOMNIA, UNSPECIFIED TYPE: ICD-10-CM

## 2024-02-26 DIAGNOSIS — J30.9 ALLERGIC RHINITIS, UNSPECIFIED SEASONALITY, UNSPECIFIED TRIGGER: ICD-10-CM

## 2024-02-26 DIAGNOSIS — E78.5 HYPERLIPIDEMIA, UNSPECIFIED HYPERLIPIDEMIA TYPE: ICD-10-CM

## 2024-02-26 DIAGNOSIS — N18.31 STAGE 3A CHRONIC KIDNEY DISEASE (HCC): ICD-10-CM

## 2024-02-26 DIAGNOSIS — G43.009 MIGRAINE WITHOUT AURA AND WITHOUT STATUS MIGRAINOSUS, NOT INTRACTABLE: ICD-10-CM

## 2024-02-26 DIAGNOSIS — E55.9 VITAMIN D DEFICIENCY: Primary | ICD-10-CM

## 2024-02-26 DIAGNOSIS — I27.20 PULMONARY HYPERTENSION (HCC): ICD-10-CM

## 2024-02-26 DIAGNOSIS — L30.8 PERIVASCULAR DERMATITIS: ICD-10-CM

## 2024-02-26 RX ORDER — BUPROPION HYDROCHLORIDE 150 MG/1
150 TABLET, EXTENDED RELEASE ORAL DAILY
Qty: 90 TABLET | Refills: 1 | Status: SHIPPED | OUTPATIENT
Start: 2024-02-26

## 2024-02-26 RX ORDER — METOPROLOL TARTRATE 50 MG/1
50 TABLET, FILM COATED ORAL DAILY
Qty: 90 TABLET | Refills: 1 | Status: SHIPPED | OUTPATIENT
Start: 2024-02-26

## 2024-02-26 NOTE — PATIENT INSTRUCTIONS
Yue Browne's SCREENING SCHEDULE   Tests on this list are recommended by your physician but may not be covered, or covered at this frequency, by your insurer.   Please check with your insurance carrier before scheduling to verify coverage.   PREVENTATIVE SERVICES FREQUENCY &  COVERAGE DETAILS LAST COMPLETION DATE   Diabetes Screening    Fasting Blood Sugar /  Glucose    One screening every 12 months if never tested or if previously tested but not diagnosed with pre-diabetes   One screening every 6 months if diagnosed with pre-diabetes Lab Results   Component Value Date     (H) 01/05/2024        Cardiovascular Disease Screening    Lipid Panel  Cholesterol  Lipoprotein (HDL)  Triglycerides Covered every 5 years for all Medicare beneficiaries without apparent signs or symptoms of cardiovascular disease Lab Results   Component Value Date    CHOLEST 218 (H) 01/05/2024    HDL 71 (H) 01/05/2024     (H) 01/05/2024    TRIG 107 01/05/2024         Electrocardiogram (EKG)   Covered if needed at Welcome to Medicare, and non-screening if indicated for medical reasons 01/11/2024      Ultrasound Screening for Abdominal Aortic Aneurysm (AAA) Covered once in a lifetime for one of the following risk factors   • Men who are 65-75 years old and have ever smoked   • Anyone with a family history -     Colorectal Cancer Screening  Covered for ages 50-85; only need ONE of the following:    Colonoscopy   Covered every 10 years    Covered every 2 years if patient is at high risk or previous colonoscopy was abnormal 10/19/2017    No recommendations at this time    Flexible Sigmoidoscopy   Covered every 4 years -    Fecal Occult Blood Test Covered annually -   Bone Density Screening    Bone density screening    Covered every 2 years after age 65 if diagnosed with risk of osteoporosis or estrogen deficiency.    Covered yearly for long-term glucocorticoid medication use (Steroids) No results found for this or any previous  visit.      No recommendations at this time   Pap and Pelvic    Pap   Covered every 2 years for women at normal risk; Annually if at high risk -  No recommendations at this time    Chlamydia Annually if high risk -  No recommendations at this time   Screening Mammogram    Mammogram     Recommend annually for all female patients aged 40 and older    One baseline mammogram covered for patients aged 35-39 10/11/2023    Health Maintenance   Topic Date Due   • Mammogram  10/11/2024       Immunizations    Influenza Covered once per flu season  Please get every year 09/06/2023  No recommendations at this time    Pneumococcal Each vaccine ([id number] & [id number]) covered once after 65 Prevnar 13: 04/05/2018    [id number]: 05/24/2019     No recommendations at this time    Hepatitis B One screening covered for patients with certain risk factors   -  No recommendations at this time    Tetanus Toxoid Not covered by Medicare Part B unless medically necessary (cut with metal); may be covered with your pharmacy prescription benefits -    Tetanus, Diptheria and Pertusis TD and TDaP Not covered by Medicare Part B -  No recommendations at this time    Zoster Not covered by Medicare Part B; may be covered with your pharmacy  prescription benefits 01/23/2008  No recommendations at this time

## 2024-02-26 NOTE — PROGRESS NOTES
Subjective:   Yue Browne is a 78 year old female who presents for a MA (Medicare Advantage) Supervisit (Once per calendar year) and stable chronic illnesses (not addressed in visit).   Patient has noticed some visual changes when she is in stores in the past few months. Pt not sure if it is the lighting in the stores or just her trying to focus and read labels. Pt states it only occurs in the stores. Pt will f/u with her optho at [facility] if it continues. Pt's last eye exam was 9/2023.  FYI-Pt also is eating more of a plant based diet but has been having a hard time digesting a lot of beans.    History/Other:   Fall Risk Assessment:   She has been screened for Falls and is low risk.      Cognitive Assessment:   She had a completely normal cognitive assessment - see flowsheet entries     Functional Ability/Status:   Yue Browne has some abnormal functions as listed below:  She has Hearing problems based on screening of functional status.      Depression Screening (PHQ-2/PHQ-9): PHQ-2 SCORE: 0  , done 2/26/2024   Last [place] Suicide Screening on 2/26/2024 was No Risk.     <5 minutes spent screening and counseling for depression    Advanced Directives:   She does have a Living Will but we do NOT have it on file in Epic.    She does have a POA but we do NOT have it on file in [employer].    Patient has Advance Care Planning documents but we do not have a copy in EMR. Discussed Advanced Care Planning with patient and instructed patient to get our office a copy to be scanned into EMR.      Patient Active Problem List   Diagnosis    Hyperlipemia    Osteoporosis    GERD (gastroesophageal reflux disease)    Hypertension    Insomnia    Idiopathic urticaria    Generalized anxiety disorder    Diverticulosis of colon    Diaphragmatic hernia    Allergic rhinitis    Depression, major, recurrent (HCC)    Atherosclerosis of abdominal aorta (HCC)    Migraine without aura or status migrainosus    Pulmonary  hypertension (HCC)    Postmenopausal atrophic vaginitis    CKD (chronic kidney disease) stage 3, GFR 30-59 ml/min (HCC)    Perivascular dermatitis    Osteoarthritis of left shoulder    Right acoustic neuroma (HCC)    Pseudophakia of left eye    Vitamin D deficiency     Allergies:  She is allergic to sulfasalazine, ampicillin-sulbactam sodium, darvocet [propoxyphene n-apap], sulfa drugs cross reactors, and pravastatin.    Current Medications:  Outpatient Medications Marked as Taking for the 2/26/24 encounter (Office Visit) with Vicky Vann APRN   Medication Sig    buPROPion  MG Oral Tablet 12 Hr Take 1 tablet (150 mg total) by mouth daily.    metoprolol tartrate 50 MG Oral Tab Take 1 tablet (50 mg total) by mouth daily.    hydrOXYzine 25 MG Oral Tab Take 2 tablets (50 mg total) by mouth at bedtime.    ezetimibe 10 MG Oral Tab Take 1 tablet (10 mg total) by mouth every evening.    temazepam 30 MG Oral Cap Take 1 capsule (30 mg total) by mouth nightly as needed for Sleep. AT BEDTIME    NON FORMULARY Take 2 capsules by mouth daily. SEED    montelukast 10 MG Oral Tab Take 1 tablet (10 mg total) by mouth nightly.    Omega-3 Fatty Acids (FISH OIL OR) Take 1 capsule by mouth daily.    Cyanocobalamin (VITAMIN B-12 OR) Take by mouth.       Medical History:  She  has a past medical history of Age-related cataract (10/1/2020), Chest pain, Depression, Dermatofibroma (10/1/2020), Essential hypertension, Heart murmur, Hernia, hiatal, High blood pressure, History of cardiac cath (3/2010), Hives, Osteoarthrosis involving lower leg (3/3/2015), Primary osteoarthritis of first carpometacarpal joint of right hand (7/7/2017), and Tachycardia.  Surgical History:  She  has a past surgical history that includes colonoscopy (2001, 1/12, 10/17); correct bunion,simple; hemorrhoidectomy (1987); other surgical history (3/4/13); other surgical history (may 23 2014); hernia surgery; Ventral hernia repair (N/A, 3/26/2015); esophageal  motility study (1/12); colonoscopy (N/A, 10/19/2017); and egd (01/2012).   Family History:  Her family history includes Breast Cancer in her maternal grandmother; Cancer in her maternal grandmother; Heart Disease in her father; Stroke in her mother; hypothyroidism in her mother.  Social History:  She  reports that she has never smoked. She has never used smokeless tobacco. She reports current alcohol use. She reports that she does not use drugs.    Tobacco:  She has never smoked tobacco.    CAGE Alcohol Screen:   CAGE screening score of 0 on 2/26/2024, showing low risk of alcohol abuse.      Patient Care Team:  Flakita Anders MD as PCP - General (Family Practice)  Xavier Hampton MD (GASTROENTEROLOGY)    Review of Systems  GENERAL: feels well otherwise  SKIN: denies any unusual skin lesions  EYES: denies blurred vision or double vision  HEENT: denies nasal congestion, sinus pain or ST  LUNGS: denies shortness of breath with exertion,+HTN  CARDIOVASCULAR: denies chest pain on exertion  GI: denies abdominal pain, +GERD  : denies dysuria, vaginal discharge or itching,+ atrophic vaginitis  MUSCULOSKELETAL: denies back pain,+OA left shoulder  NEURO:+ migraine headaches  PSYCHE: + depression and anxiety, insomnia  HEMATOLOGIC: denies hx of anemia  ENDOCRINE: denies thyroid history,Vit D def  ALL/ASTHMA: +allergies,urticaria     Objective:   Physical Exam  General Appearance:  Alert, cooperative, no distress, appears stated age   Head:  Normocephalic, without obvious abnormality, atraumatic   Eyes:  PERRL, conjunctiva/corneas clear, EOM's intact both eyes   Ears:  Normal TM's and external ear canals, both ears   Nose: Nares normal, septum midline,mucosa normal, no drainage or sinus tenderness   Throat: Lips, mucosa, and tongue normal; teeth and gums normal   Neck: Supple, symmetrical, trachea midline, no adenopathy;  thyroid: not enlarged, symmetric, no tenderness/mass/nodules; no carotid bruit or JVD   Back:    Symmetric, no curvature, ROM normal, no CVA tenderness   Lungs:   Clear to auscultation bilaterally, respirations unlabored   Heart:  Regular rate and rhythm, S1 and S2 normal, no murmur, rub, or gallop   Abdomen:   Soft, non-tender, bowel sounds active all four quadrants,  no masses, no organomegaly   Pelvic: Deferred   Extremities: Extremities normal, atraumatic, no cyanosis or edema   Pulses: 2+ and symmetric   Skin: Skin color, texture, turgor normal, no rashes or lesions   Lymph nodes: Cervical, supraclavicular, and axillary nodes normal   Neurologic: Normal       /76 (BP Location: Left arm, Patient Position: Sitting, Cuff Size: adult)   Pulse 70   Temp 97.3 °F (36.3 °C) (Temporal)   Resp 16   Ht 5' 1\" (1.549 m)   Wt 156 lb (70.8 kg)   LMP 03/16/2000 (Approximate)   SpO2 98%   BMI 29.48 kg/m²  Estimated body mass index is 29.48 kg/m² as calculated from the following:    Height as of this encounter: 5' 1\" (1.549 m).    Weight as of this encounter: 156 lb (70.8 kg).    Medicare Hearing Assessment:   Hearing Screening    Time taken: 2/26/2024  3:26 PM  Entry User: Migdalia Carnes CMA  Screening Method: Finger Rub  Finger Rub Result: Pass               Visual Acuity:     Right Eye Chart Acuity: 20/40     Left Eye Chart Acuity: 20/40     Both Eyes Chart Acuity: 20/40   Able To Tolerate Visual Acuity: Yes        Assessment & Plan:   Yue Browne is a 78 year old female who presents for a Medicare Assessment.     1. Vitamin D deficiency (Primary)  2. Pulmonary hypertension (HCC)  Overview:  On Echo 1/2020  3. Episode of recurrent major depressive disorder, unspecified depression episode severity (HCC)  -     buPROPion HCl ER (SR); Take 1 tablet (150 mg total) by mouth daily.  Dispense: 90 tablet; Refill: 1  4. Atherosclerosis of abdominal aorta (HCC)  5. Right acoustic neuroma (HCC)  6. Stage 3a chronic kidney disease (HCC)  7. Diverticulosis of colon  8. Allergic rhinitis, unspecified  seasonality, unspecified trigger  9. Insomnia, unspecified type  10. Primary hypertension  -     Metoprolol Tartrate; Take 1 tablet (50 mg total) by mouth daily.  Dispense: 90 tablet; Refill: 1  11. Migraine without aura and without status migrainosus, not intractable  12. Pseudophakia of left eye  13. Diaphragmatic hernia without obstruction and without gangrene  14. Hyperlipidemia, unspecified hyperlipidemia type  15. Generalized anxiety disorder  -     buPROPion HCl ER (SR); Take 1 tablet (150 mg total) by mouth daily.  Dispense: 90 tablet; Refill: 1  16. Primary osteoarthritis of left shoulder  17. Idiopathic urticaria  18. Age-related osteoporosis without current pathological fracture  19. Gastroesophageal reflux disease with esophagitis without hemorrhage  20. Postmenopausal atrophic vaginitis  21. Perivascular dermatitis  22. Essential hypertension  -     Metoprolol Tartrate; Take 1 tablet (50 mg total) by mouth daily.  Dispense: 90 tablet; Refill: 1        PLAN;  1. Pulmonary hypertension (HCC)  -stable    2. Episode of recurrent major depressive disorder, unspecified depression episode severity (HCC)  - stable, continue medication  - buPROPion  MG Oral Tablet 12 Hr; Take 1 tablet (150 mg total) by mouth daily.  Dispense: 90 tablet; Refill: 1    3. Atherosclerosis of abdominal aorta (HCC)  stable, continue to monitor and work on getting lipids under better control     4. Right acoustic neuroma (HCC)  -managed by Dr. Leger    5. Stage 3a chronic kidney disease (HCC)  -continue to monitor labs    6. Vitamin D deficiency  -check vit D  - take otc supplement of vit D 2000IU daily     7. Diverticulosis of colon  Managed by Memo Frey    8. Allergic rhinitis, unspecified seasonality, unspecified trigger  Managed by Dr. Nice    9. Insomnia, unspecified type  - continue temazepam as needed     10. Primary hypertension  Conservative measures dicussed. Continue medication.  Diet and exercise explained and  encouraged.  Home blood pressure monitoring. Pt should measure BP’s two to three times per week. Goal blood pressure at home - < 135/85.     - metoprolol tartrate 50 MG Oral Tab; Take 1 tablet (50 mg total) by mouth daily.  Dispense: 90 tablet; Refill: 1    11. Migraine without aura and without status migrainosus, not intractable  - stable, continue Imitrex as needed    12. Pseudophakia of left eye  - managed by Dr. Galindo     13. Diaphragmatic hernia without obstruction and without gangrene  -managed by Dr. Hampton     14. Hyperlipidemia, unspecified hyperlipidemia type  Pt to continue their medication, increase exercise,  choose better fats,  increase fiber and avoid processed foods.      15. Generalized anxiety disorder  -stable, continue medication  - buPROPion  MG Oral Tablet 12 Hr; Take 1 tablet (150 mg total) by mouth daily.  Dispense: 90 tablet; Refill: 1    16. Primary osteoarthritis of left shoulder  -managed by Dr. Cabrales    17. Idiopathic urticaria  Managed by Dr. Nice    18. Age-related osteoporosis without current pathological fracture  -managed by her gyne ,Dr. Suzie Blas     19. Gastroesophageal reflux disease with esophagitis without hemorrhage  -managed by Dr. Hampton     20. Postmenopausal atrophic vaginitis  -managed by Dr. Suzie Blas     21. Perivascular dermatitis  - managed by Dr. Tang     22. Essential hypertension  Conservative measures dicussed. Continue medication.  Diet and exercise explained and encouraged.  Home blood pressure monitoring. Pt should measure BP’s two to three times per week. Goal blood pressure at home - < 135/85.     - metoprolol tartrate 50 MG Oral Tab; Take 1 tablet (50 mg total) by mouth daily.  Dispense: 90 tablet; Refill: 1    The patient indicates understanding of these issues and agrees to the plan.  Continue with current treatment plan.  Follow up in  6  month(s).  Reinforced healthy diet, lifestyle, and exercise.      No follow-ups on file.     Vicky  HERB Vann, 2/26/2024     Supplementary Documentation:   General Health:  In the past six months, have you lost more than 10 pounds without trying?: 2 - No  Has your appetite been poor?: No  Type of Diet: Vegetarian  How does the patient maintain a good energy level?: Appropriate Exercise;Daily Walks  How would you describe your daily physical activity?: Moderate  How would you describe your current health state?: Good  How do you maintain positive mental well-being?: Social Interaction;Puzzles;Visiting Family  On a scale of 0 to 10, with 0 being no pain and 10 being severe pain, what is your pain level?: 0 - (None)  In the past six months, have you experienced urine leakage?: 0-No  At any time do you feel concerned for the safety/well-being of yourself and/or your children, in your home or elsewhere?: No  Have you had any immunizations at another office such as Influenza, Hepatitis B, Tetanus, or Pneumococcal?: No       Yue Browne's SCREENING SCHEDULE   Tests on this list are recommended by your physician but may not be covered, or covered at this frequency, by your insurer.   Please check with your insurance carrier before scheduling to verify coverage.   PREVENTATIVE SERVICES FREQUENCY &  COVERAGE DETAILS LAST COMPLETION DATE   Diabetes Screening    Fasting Blood Sugar /  Glucose    One screening every 12 months if never tested or if previously tested but not diagnosed with pre-diabetes   One screening every 6 months if diagnosed with pre-diabetes Lab Results   Component Value Date     (H) 01/05/2024        Cardiovascular Disease Screening    Lipid Panel  Cholesterol  Lipoprotein (HDL)  Triglycerides Covered every 5 years for all Medicare beneficiaries without apparent signs or symptoms of cardiovascular disease Lab Results   Component Value Date    CHOLEST 218 (H) 01/05/2024    HDL 71 (H) 01/05/2024     (H) 01/05/2024    TRIG 107 01/05/2024         Electrocardiogram (EKG)   Covered if  needed at Welcome to Medicare, and non-screening if indicated for medical reasons 01/11/2024      Ultrasound Screening for Abdominal Aortic Aneurysm (AAA) Covered once in a lifetime for one of the following risk factors    Men who are 65-75 years old and have ever smoked    Anyone with a family history -     Colorectal Cancer Screening  Covered for ages 50-85; only need ONE of the following:    Colonoscopy   Covered every 10 years    Covered every 2 years if patient is at high risk or previous colonoscopy was abnormal 10/19/2017    No recommendations at this time    Flexible Sigmoidoscopy   Covered every 4 years -    Fecal Occult Blood Test Covered annually -   Bone Density Screening    Bone density screening    Covered every 2 years after age 65 if diagnosed with risk of osteoporosis or estrogen deficiency.    Covered yearly for long-term glucocorticoid medication use (Steroids) No results found for this or any previous visit.      No recommendations at this time   Pap and Pelvic    Pap   Covered every 2 years for women at normal risk; Annually if at high risk -  No recommendations at this time    Chlamydia Annually if high risk -  No recommendations at this time   Screening Mammogram    Mammogram     Recommend annually for all female patients aged 40 and older    One baseline mammogram covered for patients aged 35-39 10/11/2023    Health Maintenance   Topic Date Due    Mammogram  10/11/2024       Immunizations    Influenza Covered once per flu season  Please get every year 09/06/2023  No recommendations at this time    Pneumococcal Each vaccine ([id number] & [id number]) covered once after 65 Prevnar 13: 04/05/2018    [id number]: 05/24/2019     No recommendations at this time    Hepatitis B One screening covered for patients with certain risk factors   -  No recommendations at this time    Tetanus Toxoid Not covered by Medicare Part B unless medically necessary (cut with metal); may be covered with your pharmacy  prescription benefits -    Tetanus, Diptheria and Pertusis TD and TDaP Not covered by Medicare Part B -  No recommendations at this time    Zoster Not covered by Medicare Part B; may be covered with your pharmacy  prescription benefits 01/23/2008  No recommendations at this time

## 2024-04-06 DIAGNOSIS — E78.5 HYPERLIPIDEMIA, UNSPECIFIED HYPERLIPIDEMIA TYPE: ICD-10-CM

## 2024-04-07 RX ORDER — EZETIMIBE 10 MG/1
10 TABLET ORAL EVERY EVENING
Qty: 90 TABLET | Refills: 0 | Status: SHIPPED | OUTPATIENT
Start: 2024-04-07

## 2024-07-09 DIAGNOSIS — E78.5 HYPERLIPIDEMIA, UNSPECIFIED HYPERLIPIDEMIA TYPE: ICD-10-CM

## 2024-07-10 RX ORDER — EZETIMIBE 10 MG/1
10 TABLET ORAL EVERY EVENING
Qty: 90 TABLET | Refills: 1 | Status: SHIPPED | OUTPATIENT
Start: 2024-07-10

## 2024-07-10 NOTE — TELEPHONE ENCOUNTER
Ezetimibe 10 mg  Filled 4-7-24  Qty 90  0 refills  Future Appointments   Date Time Provider Department Center   8/12/2024  2:30 PM Wily Jonhson MD [facility]   LOV 2-26-24 SM  Labs 1-5-24 Lipid

## 2024-07-23 ENCOUNTER — TELEPHONE (OUTPATIENT)
Dept: INTERNAL MEDICINE CLINIC | Facility: CLINIC | Age: 79
End: 2024-07-23

## 2024-07-23 ENCOUNTER — OFFICE VISIT (OUTPATIENT)
Dept: INTERNAL MEDICINE CLINIC | Facility: CLINIC | Age: 79
End: 2024-07-23
Payer: MEDICARE

## 2024-07-23 VITALS
DIASTOLIC BLOOD PRESSURE: 62 MMHG | SYSTOLIC BLOOD PRESSURE: 122 MMHG | OXYGEN SATURATION: 96 % | WEIGHT: 153.19 LBS | HEIGHT: 61 IN | RESPIRATION RATE: 16 BRPM | HEART RATE: 68 BPM | BODY MASS INDEX: 28.92 KG/M2 | TEMPERATURE: 98 F

## 2024-07-23 DIAGNOSIS — I10 PRIMARY HYPERTENSION: ICD-10-CM

## 2024-07-23 DIAGNOSIS — R10.11 RIGHT UPPER QUADRANT ABDOMINAL PAIN: ICD-10-CM

## 2024-07-23 DIAGNOSIS — K21.00 GASTROESOPHAGEAL REFLUX DISEASE WITH ESOPHAGITIS WITHOUT HEMORRHAGE: ICD-10-CM

## 2024-07-23 DIAGNOSIS — R73.01 IMPAIRED FASTING GLUCOSE: ICD-10-CM

## 2024-07-23 DIAGNOSIS — E78.5 HYPERLIPIDEMIA, UNSPECIFIED HYPERLIPIDEMIA TYPE: Primary | ICD-10-CM

## 2024-07-23 PROCEDURE — G2211 COMPLEX E/M VISIT ADD ON: HCPCS | Performed by: FAMILY MEDICINE

## 2024-07-23 PROCEDURE — 3074F SYST BP LT 130 MM HG: CPT | Performed by: FAMILY MEDICINE

## 2024-07-23 PROCEDURE — 99214 OFFICE O/P EST MOD 30 MIN: CPT | Performed by: FAMILY MEDICINE

## 2024-07-23 PROCEDURE — 1159F MED LIST DOCD IN RCRD: CPT | Performed by: FAMILY MEDICINE

## 2024-07-23 PROCEDURE — 3008F BODY MASS INDEX DOCD: CPT | Performed by: FAMILY MEDICINE

## 2024-07-23 PROCEDURE — 1160F RVW MEDS BY RX/DR IN RCRD: CPT | Performed by: FAMILY MEDICINE

## 2024-07-23 PROCEDURE — 3078F DIAST BP <80 MM HG: CPT | Performed by: FAMILY MEDICINE

## 2024-07-23 RX ORDER — PANTOPRAZOLE SODIUM 40 MG/1
40 TABLET, DELAYED RELEASE ORAL
COMMUNITY
Start: 2024-07-13

## 2024-07-23 NOTE — TELEPHONE ENCOUNTER
Per patient provider advised to check with Lab if they had ULS apts available, and that she does bot need to fax.     Per patient she went over to lab, lab is advised she needs to fax for ULS imaging order.     Patient would like clarification     Please advise     CB# [phone]

## 2024-07-23 NOTE — PROGRESS NOTES
Yue Browne is a 79 year old female who presents for abdominal pain.  Pain is located at RUQ. Pain is described as dull. Severity is mild, off and on. Associated symptoms: GERD which she is taking Protonix and flatulence.. The pain does not radiate..  Has had on and off for about 3 weeks. Pt was seen at an urgent care by her house for this and was told it was possibly GERD and was given the PPI. They also told her it could possibly be her gallbladder because she is having pain in the right upper quad of her abdomen.  Pain is worsened by occ by eating. Pain usually goes away on its own.Appetite is good. Any weight loss: 3 pounds since February.. Prior abdomial pain hx: ventral hernia repair,.   Denies: jaundice, abnormal discoloration of the stool or skin, no anorexia,  vomiting. No pruritis, no abdominal distention.No yellow discoloration of the sclera.  No travel, no sick contacts, no suspicious food.    Patient is feeling sad. Pt's dog passed away on 6/23/24. Pt states this may be playing a little part in it. Not a great appetite and has been crying a lot due to her missing her dog.  Pt hopes to get a new dog in the future.    Patient presents for recheck of their hypertension/hyperlipidemia. Pt has been taking medications as instructed, no medication side effects.  Currently asymptomatic, no chest pains, jaw pains, arm pains. No headaches, dizziness or edema.  No SOB on exertion or rest.  Pt has been following a low fat diet and has been exercising but not walking as much with her dog passing away.    BP Readings from Last 3 Encounters:   07/23/24 122/62   02/26/24 124/76   01/30/24 122/76       Wt Readings from Last 6 Encounters:   07/23/24 153 lb 3.2 oz (69.5 kg)   02/26/24 156 lb (70.8 kg)   01/30/24 155 lb 9.6 oz (70.6 kg)   01/11/24 155 lb 9.6 oz (70.6 kg)   12/14/23 145 lb (65.8 kg)   09/26/23 155 lb 12.8 oz (70.7 kg)     Body mass index is 28.95 kg/m².     Cholesterol, Total (mg/dL)   Date Value    01/05/2024 218 (H)   06/16/2023 199   11/07/2022 228 (H)     CHOLESTEROL, TOTAL (mg/dL)   Date Value   10/06/2017 224 (H)   07/16/2015 253 (H)   11/13/2013 251 (H)     CHOLESTEROL (mg/dL)   Date Value   05/24/2014 160     HDL Cholesterol (mg/dL)   Date Value   01/05/2024 71 (H)   06/16/2023 64 (H)   11/07/2022 69 (H)     HDL CHOLESTEROL (mg/dL)   Date Value   10/06/2017 61   07/16/2015 65   11/13/2013 66     HDL CHOL (mg/dL)   Date Value   05/24/2014 55     LDL Cholesterol (mg/dL)   Date Value   01/05/2024 128 (H)   06/16/2023 110 (H)   11/07/2022 137 (H)     LDL-CHOLESTEROL (mg/dL (calc))   Date Value   10/06/2017 137 (H)   07/16/2015 159 (H)   11/13/2013 156 (H)     LDL CHOLESTROL (mg/dL)   Date Value   05/24/2014 86     AST (U/L)   Date Value   01/05/2024 31   06/16/2023 22   04/20/2022 19   10/06/2017 23   07/16/2015 25   05/23/2014 14 (L)   11/13/2013 23     ALT (U/L)   Date Value   01/05/2024 26   06/16/2023 25   04/20/2022 24   10/06/2017 21   07/16/2015 23   05/23/2014 25   11/13/2013 23      Current Outpatient Medications   Medication Sig Dispense Refill    pantoprazole 40 MG Oral Tab EC Take 1 tablet (40 mg total) by mouth every morning before breakfast.      ezetimibe 10 MG Oral Tab TAKE ONE TABLET BY MOUTH EVERY EVENING 90 tablet 1    buPROPion  MG Oral Tablet 12 Hr Take 1 tablet (150 mg total) by mouth daily. 90 tablet 1    metoprolol tartrate 50 MG Oral Tab Take 1 tablet (50 mg total) by mouth daily. 90 tablet 1    hydrOXYzine 25 MG Oral Tab Take 2 tablets (50 mg total) by mouth at bedtime. 60 tablet 5    temazepam 30 MG Oral Cap Take 1 capsule (30 mg total) by mouth nightly as needed for Sleep. AT BEDTIME 90 capsule 0    NON FORMULARY Take 2 capsules by mouth daily. SEED      montelukast 10 MG Oral Tab Take 1 tablet (10 mg total) by mouth nightly.      Omega-3 Fatty Acids (FISH OIL OR) Take 1 capsule by mouth daily.      Cyanocobalamin (VITAMIN B-12 OR) Take by mouth.      hydrocortisone 2.5 %  External Cream Apply 1 Application topically 2 (two) times daily. Apply every morning and evening. (Patient not taking: Reported on 1/30/2024) 30 g 1    MAGNESIUM OR Take 1 capsule by mouth daily. (Patient not taking: Reported on 1/11/2024)        Past Medical History:    Age-related cataract    Chest pain    admitt cp normal w/u    Depression    Dermatofibroma    Essential hypertension    Heart murmur    Hernia, hiatal    High blood pressure    History of cardiac cath    normal    Hives    Osteoarthrosis involving lower leg    Primary osteoarthritis of first carpometacarpal joint of right hand    Tachycardia      Past Surgical History:   Procedure Laterality Date    Colonoscopy  2001, 1/12, 10/17    Colonoscopy N/A 10/19/2017    Procedure: COLONOSCOPY, POSSIBLE BIOPSY, POSSIBLE POLYPECTOMY 17477;  Surgeon: Xavier Hampton MD;  Location: [facility]    Correct bunion,simple      B/L    Egd  01/2012    Esophageal motility study  1/12    Hemorrhoidectomy  1987    Hernia surgery      2013    Other surgical history  3/4/13    [facility] - Dr. Okeefe/ lap repair diag hernia, lap nissen fundoplication    Other surgical history  may 23 2014    Hiatel hernia repair by Dr. Okeefe (open)    Ventral hernia repair N/A 3/26/2015    Procedure: HERNIA VENTRAL REPAIR;  Surgeon: Jim Okeefe MD;  Location:  MAIN OR      Family History   Problem Relation Age of Onset    Heart Disease Father     Stroke Mother     Other (hypothyroidism) Mother     Cancer Maternal Grandmother         breast cancer    Breast Cancer Maternal Grandmother       Social History:  Social History     Socioeconomic History    Marital status:    Tobacco Use    Smoking status: Never    Smokeless tobacco: Never   Vaping Use    Vaping status: Never Used   Substance and Sexual Activity    Alcohol use: Yes     Comment: occ~ \"maybe once or twice per month\"    Drug use: No   Other Topics Concern    Caffeine Concern Yes     Comment: 8-12 oz. of  coffee daily.    Exercise Yes     Comment: Walks her dog daily and yoga         REVIEW OF SYSTEMS:   GENERAL: feels tired, no lethargy, no fever, no chills  SKIN: denies any unusual skin lesions, rash.  HEENT:no abnormal taste  LUNGS: denies shortness of breath with exertion  CARDIOVASCULAR: denies chest pain on exertion  GI: as above.No melena, no rectal bleeding.No vomiting.  :no dysuria.  MUSCULOSKELETAL: no myalgia  NEURO: denies headaches      EXAM:   /62 (BP Location: Left arm, Patient Position: Sitting, Cuff Size: adult)   Pulse 68   Temp 97.5 °F (36.4 °C) (Temporal)   Resp 16   Ht 5' 1\" (1.549 m)   Wt 153 lb 3.2 oz (69.5 kg)   LMP 03/16/2000 (Approximate)   SpO2 96%   BMI 28.95 kg/m²   Body mass index is 28.95 kg/m².   GENERAL: well developed, well nourished,in no apparent distress  SKIN: no rashes,no suspicious lesions  HEENT:clear  EYES:PERRLA, EOMI, sclera not icteric.  NECK: supple,no adenopathy  LUNGS: clear to auscultation  CARDIO: RRR without murmur  GI: good BS's,no masses, HSM .Erazo negative, no guarding. +RUQ tenderness  EXTREMITIES: no edema  NEURO: DTR 2+bilaterally upper and lower extremities.    ASSESSMENT AND PLAN:   Yue Browne is a 79 year old female who presents for     Encounter Diagnoses   Name Primary?    Hyperlipidemia, unspecified hyperlipidemia type Yes    Primary hypertension     Impaired fasting glucose     Right upper quadrant abdominal pain     Gastroesophageal reflux disease with esophagitis without hemorrhage        Orders Placed This Encounter   Procedures    Comp Metabolic Panel (14)    Lipid Panel    Hemoglobin A1C       Meds & Refills for this Visit:  Requested Prescriptions      No prescriptions requested or ordered in this encounter       Imaging & Consults:  US ABDOMEN COMPLETE (CPT=76700)      1. Hyperlipidemia, unspecified hyperlipidemia type  Pt to continue their medication, increase exercise,  choose better fats,  increase fiber and avoid  processed foods.  - Comp Metabolic Panel (14); Future  - Lipid Panel; Future    2. Primary hypertension  Conservative measures dicussed. Continue medication.  Diet and exercise explained and encouraged.  Fasting labs due.  Home blood pressure monitoring. Pt should measure BP’s two to three times per week. Goal blood pressure at home - < 135/85.   - Comp Metabolic Panel (14); Future  - Lipid Panel; Future    3. Impaired fasting glucose  Check level  - Hemoglobin A1C; Future    4. Right upper quadrant abdominal pain  - bland diet, plenty of water.  - if pain worsens go to the ER  - US ABDOMEN COMPLETE (CPT=76700); Future    5. Gastroesophageal reflux disease with esophagitis without hemorrhage  - continue medication, avoid trigger foods.  - pantoprazole 40 MG Oral Tab EC; Take 1 tablet (40 mg total) by mouth every morning before breakfast.    The patient indicates understanding of these issues and agrees to the plan.  Patient to call if not improving.    I spent 30 minutes preparing to see the patient,reviewing patient's chart,results,history,medical decision making,placing orders,counseling/coordinating care and documenting in the chart.

## 2024-07-25 NOTE — TELEPHONE ENCOUNTER
Patient returned call, she called to clarify if SM had stated that she did not need to fast prior to her ultrasound of the abdomen.  When scheduling she was told that she had to fast.  Spoke with SM she states that patient should do you as recommended as things may have changed.  Patient was informed of that she was advised to try to schedule ultrasound along with labs that way she does not have to fast on different days.  Patient verbalized understanding.

## 2024-08-26 ENCOUNTER — LAB ENCOUNTER (OUTPATIENT)
Dept: LAB | Age: 79
End: 2024-08-26
Attending: FAMILY MEDICINE
Payer: MEDICARE

## 2024-08-26 ENCOUNTER — HOSPITAL ENCOUNTER (OUTPATIENT)
Dept: ULTRASOUND IMAGING | Age: 79
Discharge: HOME | End: 2024-08-26
Attending: FAMILY MEDICINE
Payer: MEDICARE

## 2024-08-26 DIAGNOSIS — R10.11 RIGHT UPPER QUADRANT ABDOMINAL PAIN: ICD-10-CM

## 2024-08-26 DIAGNOSIS — I10 PRIMARY HYPERTENSION: ICD-10-CM

## 2024-08-26 DIAGNOSIS — E55.9 VITAMIN D DEFICIENCY: ICD-10-CM

## 2024-08-26 DIAGNOSIS — R73.01 IMPAIRED FASTING GLUCOSE: ICD-10-CM

## 2024-08-26 DIAGNOSIS — E78.5 HYPERLIPIDEMIA, UNSPECIFIED HYPERLIPIDEMIA TYPE: ICD-10-CM

## 2024-08-26 LAB
ALBUMIN SERPL-MCNC: 4.5 G/DL (ref 3.2–4.8)
ALBUMIN/GLOB SERPL: 2.3 {RATIO} (ref 1–2)
ALP LIVER SERPL-CCNC: 86 U/L
ALT SERPL-CCNC: 22 U/L
ANION GAP SERPL CALC-SCNC: 3 MMOL/L (ref 0–18)
AST SERPL-CCNC: 23 U/L (ref ?–34)
BILIRUB SERPL-MCNC: 0.6 MG/DL (ref 0.2–1.1)
BUN BLD-MCNC: 13 MG/DL (ref 9–23)
CALCIUM BLD-MCNC: 9.3 MG/DL (ref 8.7–10.4)
CHLORIDE SERPL-SCNC: 112 MMOL/L (ref 98–112)
CHOLEST SERPL-MCNC: 203 MG/DL (ref ?–200)
CO2 SERPL-SCNC: 28 MMOL/L (ref 21–32)
CREAT BLD-MCNC: 1.09 MG/DL
EGFRCR SERPLBLD CKD-EPI 2021: 52 ML/MIN/1.73M2 (ref 60–?)
EST. AVERAGE GLUCOSE BLD GHB EST-MCNC: 114 MG/DL (ref 68–126)
FASTING PATIENT LIPID ANSWER: YES
FASTING STATUS PATIENT QL REPORTED: YES
GLOBULIN PLAS-MCNC: 2 G/DL (ref 2–3.5)
GLUCOSE BLD-MCNC: 99 MG/DL (ref 70–99)
HBA1C MFR BLD: 5.6 % (ref ?–5.7)
HDLC SERPL-MCNC: 69 MG/DL (ref 40–59)
LDLC SERPL CALC-MCNC: 115 MG/DL (ref ?–100)
NONHDLC SERPL-MCNC: 134 MG/DL (ref ?–130)
OSMOLALITY SERPL CALC.SUM OF ELEC: 296 MOSM/KG (ref 275–295)
POTASSIUM SERPL-SCNC: 4.6 MMOL/L (ref 3.5–5.1)
PROT SERPL-MCNC: 6.5 G/DL (ref 5.7–8.2)
SODIUM SERPL-SCNC: 143 MMOL/L (ref 136–145)
TRIGL SERPL-MCNC: 105 MG/DL (ref 30–149)
VIT D+METAB SERPL-MCNC: 28.6 NG/ML (ref 30–100)
VLDLC SERPL CALC-MCNC: 18 MG/DL (ref 0–30)

## 2024-08-26 PROCEDURE — 82306 VITAMIN D 25 HYDROXY: CPT

## 2024-08-26 PROCEDURE — 80061 LIPID PANEL: CPT

## 2024-08-26 PROCEDURE — 36415 COLL VENOUS BLD VENIPUNCTURE: CPT

## 2024-08-26 PROCEDURE — 83036 HEMOGLOBIN GLYCOSYLATED A1C: CPT

## 2024-08-26 PROCEDURE — 76700 US EXAM ABDOM COMPLETE: CPT | Performed by: FAMILY MEDICINE

## 2024-08-26 PROCEDURE — 80053 COMPREHEN METABOLIC PANEL: CPT

## 2024-08-28 DIAGNOSIS — E55.9 VITAMIN D DEFICIENCY: Primary | ICD-10-CM

## 2024-08-28 RX ORDER — ERGOCALCIFEROL 1.25 MG/1
50000 CAPSULE, LIQUID FILLED ORAL WEEKLY
Qty: 12 CAPSULE | Refills: 1 | Status: SHIPPED | OUTPATIENT
Start: 2024-08-28

## 2024-08-30 NOTE — PROGRESS NOTES
CHIEF COMPLAINT:     Patient presents with:  Medication Follow-Up  Elbow Pain: Left elbow pain since 6/12/17. HPI:   Lilly Oates is a 67year old female   Patient presents for recheck of her hypertension/hyperlipidemina.  Pt has been taking Take 1 capsule by mouth daily. Disp:  Rfl:    Pyridoxine HCl (VITAMIN B-6 OR) Take by mouth. Disp:  Rfl:    Cyanocobalamin (VITAMIN B-12 OR) Take by mouth. Disp:  Rfl:    FOLIC ACID OR Take by mouth.  Disp:  Rfl:    SUMAtriptan Succinate (IMITREX) 50 MG Ora distress  SKIN: no rashes,no suspicious lesions  HEAD: atraumatic, normocephalic  EYES: conjunctiva clear, EOM intact, PERRLA  HEENT: ears,nose and throat clear  NECK: supple, non-tender  LUNGS: clear to auscultation bilaterally, no wheezes or rhonchi.  Judith Goal blood pressure at home - < 135/85. 4. Urticaria  Stable, continue medication.  - HydrOXYzine Pamoate 25 MG Oral Cap; TAKE ONE CAPSULE BY MOUTH EVERY DAY AS NEEDED FOR ITCHING  Dispense: 90 capsule; Refill: 1    5.  Impaired fasting glucose  Pt to c Performed Resulted

## 2024-09-07 DIAGNOSIS — I10 ESSENTIAL HYPERTENSION: ICD-10-CM

## 2024-09-07 DIAGNOSIS — I10 PRIMARY HYPERTENSION: ICD-10-CM

## 2024-09-07 RX ORDER — METOPROLOL TARTRATE 50 MG
50 TABLET ORAL DAILY
Qty: 90 TABLET | Refills: 0 | Status: SHIPPED | OUTPATIENT
Start: 2024-09-07

## 2024-10-07 ENCOUNTER — OFFICE VISIT (OUTPATIENT)
Dept: INTERNAL MEDICINE CLINIC | Facility: CLINIC | Age: 79
End: 2024-10-07
Payer: MEDICARE

## 2024-10-07 ENCOUNTER — LAB ENCOUNTER (OUTPATIENT)
Dept: LAB | Age: 79
End: 2024-10-07
Attending: FAMILY MEDICINE
Payer: MEDICARE

## 2024-10-07 VITALS
HEIGHT: 61 IN | HEART RATE: 74 BPM | WEIGHT: 156 LBS | TEMPERATURE: 98 F | OXYGEN SATURATION: 96 % | SYSTOLIC BLOOD PRESSURE: 132 MMHG | BODY MASS INDEX: 29.45 KG/M2 | DIASTOLIC BLOOD PRESSURE: 82 MMHG

## 2024-10-07 DIAGNOSIS — L65.9 HAIR LOSS: ICD-10-CM

## 2024-10-07 DIAGNOSIS — E55.9 VITAMIN D DEFICIENCY: ICD-10-CM

## 2024-10-07 DIAGNOSIS — L60.3 THIN NAILS: ICD-10-CM

## 2024-10-07 DIAGNOSIS — L65.9 HAIR LOSS: Primary | ICD-10-CM

## 2024-10-07 LAB
BASOPHILS # BLD AUTO: 0.05 X10(3) UL (ref 0–0.2)
BASOPHILS NFR BLD AUTO: 0.7 %
EOSINOPHIL # BLD AUTO: 0.48 X10(3) UL (ref 0–0.7)
EOSINOPHIL NFR BLD AUTO: 6.6 %
ERYTHROCYTE [DISTWIDTH] IN BLOOD BY AUTOMATED COUNT: 12.8 %
HCT VFR BLD AUTO: 42 %
HGB BLD-MCNC: 13.8 G/DL
IMM GRANULOCYTES # BLD AUTO: 0.02 X10(3) UL (ref 0–1)
IMM GRANULOCYTES NFR BLD: 0.3 %
LYMPHOCYTES # BLD AUTO: 1.86 X10(3) UL (ref 1–4)
LYMPHOCYTES NFR BLD AUTO: 25.7 %
MCH RBC QN AUTO: 30.5 PG (ref 26–34)
MCHC RBC AUTO-ENTMCNC: 32.9 G/DL (ref 31–37)
MCV RBC AUTO: 92.9 FL
MONOCYTES # BLD AUTO: 0.75 X10(3) UL (ref 0.1–1)
MONOCYTES NFR BLD AUTO: 10.4 %
NEUTROPHILS # BLD AUTO: 4.07 X10 (3) UL (ref 1.5–7.7)
NEUTROPHILS # BLD AUTO: 4.07 X10(3) UL (ref 1.5–7.7)
NEUTROPHILS NFR BLD AUTO: 56.3 %
PLATELET # BLD AUTO: 279 10(3)UL (ref 150–450)
RBC # BLD AUTO: 4.52 X10(6)UL
TSI SER-ACNC: 1.63 MIU/ML (ref 0.55–4.78)
VIT B12 SERPL-MCNC: 572 PG/ML (ref 211–911)
WBC # BLD AUTO: 7.2 X10(3) UL (ref 4–11)

## 2024-10-07 PROCEDURE — 84207 ASSAY OF VITAMIN B-6: CPT

## 2024-10-07 PROCEDURE — 85025 COMPLETE CBC W/AUTO DIFF WBC: CPT

## 2024-10-07 PROCEDURE — 3075F SYST BP GE 130 - 139MM HG: CPT | Performed by: FAMILY MEDICINE

## 2024-10-07 PROCEDURE — 99213 OFFICE O/P EST LOW 20 MIN: CPT | Performed by: FAMILY MEDICINE

## 2024-10-07 PROCEDURE — 1159F MED LIST DOCD IN RCRD: CPT | Performed by: FAMILY MEDICINE

## 2024-10-07 PROCEDURE — 82607 VITAMIN B-12: CPT

## 2024-10-07 PROCEDURE — 3079F DIAST BP 80-89 MM HG: CPT | Performed by: FAMILY MEDICINE

## 2024-10-07 PROCEDURE — 1160F RVW MEDS BY RX/DR IN RCRD: CPT | Performed by: FAMILY MEDICINE

## 2024-10-07 PROCEDURE — 36415 COLL VENOUS BLD VENIPUNCTURE: CPT

## 2024-10-07 PROCEDURE — 84443 ASSAY THYROID STIM HORMONE: CPT

## 2024-10-07 PROCEDURE — 3008F BODY MASS INDEX DOCD: CPT | Performed by: FAMILY MEDICINE

## 2024-10-07 RX ORDER — ESTRADIOL 0.1 MG/G
1 CREAM VAGINAL
COMMUNITY
Start: 2024-10-03

## 2024-10-07 NOTE — PROGRESS NOTES
CHIEF COMPLAINT:     Chief Complaint   Patient presents with    Hair/Scalp Problem       HPI:   Yue Browne is a 79 year old female .  Concerned about hair loss. Is noting increased amounts of hair in shower drain as well as in brush since 2020.   No areas of baldness.  Family history of hair loss: none  Use of hair care products: none    Also c/o thin splitting fingernails with 2 of the nails lifting from the nail bed for awhile but seem worse in the past week.      Current Outpatient Medications   Medication Sig Dispense Refill    HYDROXYZINE 25 MG Oral Tab TAKE 2 TABLETS BY MOUTH EVERY NIGHT AT BEDTIME 60 tablet 0    METOPROLOL TARTRATE 50 MG Oral Tab TAKE 1 TABLET BY MOUTH ONE TIME DAILY 90 tablet 0    ergocalciferol 1.25 MG (42530 UT) Oral Cap Take 1 capsule (50,000 Units total) by mouth once a week. 12 capsule 1    pantoprazole 40 MG Oral Tab EC Take 1 tablet (40 mg total) by mouth every morning before breakfast.      ezetimibe 10 MG Oral Tab TAKE ONE TABLET BY MOUTH EVERY EVENING 90 tablet 1    buPROPion  MG Oral Tablet 12 Hr Take 1 tablet (150 mg total) by mouth daily. 90 tablet 1    temazepam 30 MG Oral Cap Take 1 capsule (30 mg total) by mouth nightly as needed for Sleep. AT BEDTIME 90 capsule 0    hydrocortisone 2.5 % External Cream Apply 1 Application topically 2 (two) times daily. Apply every morning and evening. 30 g 1    NON FORMULARY Take 2 capsules by mouth daily. SEED      montelukast 10 MG Oral Tab Take 1 tablet (10 mg total) by mouth nightly.      Omega-3 Fatty Acids (FISH OIL OR) Take 1 capsule by mouth daily.      MAGNESIUM OR Take 1 capsule by mouth daily.      Cyanocobalamin (VITAMIN B-12 OR) Take by mouth.      estradiol 0.1 MG/GM Vaginal Cream Place 1 g vaginally. (Patient not taking: Reported on 10/7/2024)        Past Medical History:    Age-related cataract    Chest pain    admitt cp normal w/u    Depression    Dermatofibroma    Essential hypertension    Heart murmur     Hernia, hiatal    High blood pressure    History of cardiac cath    normal    Hives    Osteoarthrosis involving lower leg    Primary osteoarthritis of first carpometacarpal joint of right hand    Tachycardia      Social History:  Social History     Socioeconomic History    Marital status:    Tobacco Use    Smoking status: Never    Smokeless tobacco: Never   Vaping Use    Vaping status: Never Used   Substance and Sexual Activity    Alcohol use: Yes     Comment: occ~ \"maybe once or twice per month\"    Drug use: No   Other Topics Concern    Caffeine Concern Yes     Comment: 8-12 oz. of coffee daily.    Exercise Yes     Comment: Walks her dog daily and yoga     Social Determinants of Health     Physical Activity: Insufficiently Active (10/1/2024)    Received from [employer]    Physical Activity     On average, how many days per week do you engage in moderate to strenuous exercise (like a brisk walk)?: 4 days     On average, how many minutes do you engage in exercise at this level?: 30 min     On average, how many minutes do you engage in exercise at this level?: 30 min     On average, how many days per week do you engage in moderate to strenuous exercise (like a brisk walk)?: 4 days   Housing Stability: Not At Risk (10/1/2024)    Received from [facility]     What is your living situation today?: I have a steady place to live        REVIEW OF SYSTEMS:   GENERAL: Denies fever, chills,weight change, decreased appetite  SKIN: Denies rashes, skin wounds or ulcers.  HAIR and NAILS: +hair loss +thin and splitting nails  EYES: Denies blurred vision or double vision  HENT: Denies congestion, rhinorrhea, sore throat or ear pain  CHEST: Denies chest pain, or palpitations  LUNGS: Denies shortness of breath, cough, or wheezing  GI: Denies abdominal pain, N/V/C/D.   MUSCULOSKELETAL: no arthralgia or swollen joints  LYMPH:  Denies lymphadenopathy  NEURO: Denies headaches or lightheadedness      EXAM:   BP  132/82   Pulse 74   Temp 97.8 °F (36.6 °C) (Temporal)   Ht 5' 1\" (1.549 m)   Wt 156 lb (70.8 kg)   LMP 03/16/2000 (Approximate)   SpO2 96%   BMI 29.48 kg/m²   GENERAL: well developed, well nourished,in no apparent distress  SKIN: no rashes,no suspicious lesions  HAIR & NAILS: hair is fine, no bald spots. Nails are brittle, with ridges and leukonychia punctata (Left hand 4th and 5th digits.) noted   HEAD: atraumatic, normocephalic  EYES: conjunctiva clear, EOM intact  NOSE: nostrils patent  THROAT: oral mucosa pink, moist.   NECK: supple, non-tender  LUNGS: clear to auscultation bilaterally, no wheezes or rhonchi. Breathing is non labored.  CARDIO: RRR without murmur  EXTREMITIES: no cyanosis, clubbing or edema    Physical Exam    ASSESSMENT AND PLAN:     Encounter Diagnoses   Name Primary?    Hair loss Yes    Thin nails        Orders Placed This Encounter   Procedures    Vitamin B12 [E]    TSH W REFLEX TO FREE T4 [23184][Q]    Vitamin B6    CBC W Differential W Platelet [E]       Meds & Refills for this Visit:  Requested Prescriptions      No prescriptions requested or ordered in this encounter       Imaging & Consults:  None    PLAN:  1. Hair loss  - Vitamin B12 [E]; Future  - TSH W REFLEX TO FREE T4 [98442][Q]; Future  - Vitamin B6; Future  - CBC W Differential W Platelet [E]; Future    2. Thin nails  - Vitamin B12 [E]; Future  - TSH W REFLEX TO FREE T4 [35488][Q]; Future  - Vitamin B6; Future      Patient educated on caution with hair and nail products. Patient to get labs done and will f/u with results. If labs negative. Pt will need to see Dermatology  The patient indicates understanding of these issues and agrees to the plan.  The patient is asked to return if symptoms are not improving.    I spent 20 minutes preparing to see the patient,reviewing patient's chart,results,history,medical decision making,placing orders,counseling/coordinating care and documenting in the chart.

## 2024-10-13 LAB — VITAMIN B6: 16 UG/L

## 2024-11-02 ENCOUNTER — HOSPITAL ENCOUNTER (EMERGENCY)
Facility: HOSPITAL | Age: 79
Discharge: HOME | End: 2024-11-02
Attending: EMERGENCY MEDICINE
Payer: MEDICARE

## 2024-11-02 ENCOUNTER — APPOINTMENT (OUTPATIENT)
Dept: GENERAL RADIOLOGY | Facility: HOSPITAL | Age: 79
End: 2024-11-02
Attending: EMERGENCY MEDICINE
Payer: MEDICARE

## 2024-11-02 VITALS
BODY MASS INDEX: 27.6 KG/M2 | DIASTOLIC BLOOD PRESSURE: 75 MMHG | HEART RATE: 67 BPM | SYSTOLIC BLOOD PRESSURE: 132 MMHG | TEMPERATURE: 98 F | OXYGEN SATURATION: 100 % | HEIGHT: 62 IN | WEIGHT: 150 LBS | RESPIRATION RATE: 18 BRPM

## 2024-11-02 DIAGNOSIS — J44.9 CHRONIC OBSTRUCTIVE PULMONARY DISEASE, UNSPECIFIED COPD TYPE (HCC): ICD-10-CM

## 2024-11-02 DIAGNOSIS — U07.1 COVID-19: Primary | ICD-10-CM

## 2024-11-02 PROCEDURE — 71046 X-RAY EXAM CHEST 2 VIEWS: CPT | Performed by: EMERGENCY MEDICINE

## 2024-11-02 PROCEDURE — 94640 AIRWAY INHALATION TREATMENT: CPT

## 2024-11-02 PROCEDURE — 99283 EMERGENCY DEPT VISIT LOW MDM: CPT

## 2024-11-02 PROCEDURE — 99284 EMERGENCY DEPT VISIT MOD MDM: CPT

## 2024-11-02 RX ORDER — BENZONATATE 100 MG/1
100 CAPSULE ORAL 3 TIMES DAILY PRN
Qty: 30 CAPSULE | Refills: 0 | Status: SHIPPED | OUTPATIENT
Start: 2024-11-02 | End: 2024-12-02

## 2024-11-02 RX ORDER — ALBUTEROL SULFATE 90 UG/1
2 INHALANT RESPIRATORY (INHALATION) ONCE
Status: COMPLETED | OUTPATIENT
Start: 2024-11-02 | End: 2024-11-02

## 2024-11-02 RX ORDER — ZINC SULFATE 50(220)MG
220 CAPSULE ORAL DAILY
COMMUNITY

## 2024-11-02 RX ORDER — LEVOCETIRIZINE DIHYDROCHLORIDE 5 MG/1
5 TABLET, FILM COATED ORAL EVERY EVENING
COMMUNITY

## 2024-11-03 ENCOUNTER — TELEPHONE (OUTPATIENT)
Dept: CASE MANAGEMENT | Facility: HOSPITAL | Age: 79
End: 2024-11-03

## 2024-11-03 NOTE — CM/SW NOTE
EDCM received call from the [facility]  needing clarification on the nirmatrelvir-ritonavir as the med selected was the regular dose but the directions call for the renally adjusted dose. EDCM reached out to ER provider Dr. Coe. Informed by Dr. Coe that the renally adjusted dose should be ordered, (as the patients gfr is 52), nirmatrelvir-ritonavir 150-100 mg BID for 5 days. [facility] called back and informed.

## 2024-11-03 NOTE — RESPIRATORY THERAPY NOTE
Patient given 1st dose albuterol MDI 2 puffs with spacer and education. No questions at this time. Performed well.

## 2024-11-03 NOTE — ED INITIAL ASSESSMENT (HPI)
Pt to the er for ongoing cough for the past 2 weeks. Pt reports that the cough has been very intermittent. Pt denies cp and shortness of breath. Pt states she's been trying to cough up phlegm but has been unable to do so. Pt was recently diagnosed with covid today. Pt otherwise breathing easy with no other concerns at this time.

## 2024-11-03 NOTE — ED PROVIDER NOTES
Patient Seen in: [facility] Emergency Department      History     Chief Complaint   Patient presents with    Covid    Cough/URI     Stated Complaint: c/o cough for 3 wks, tested Covid (+) today, denies ELIE    Subjective:   HPI      79-year-old female presents for evaluation of dry cough ongoing for the past 2 weeks.  When symptoms started the patient tested negative for COVID.  Most recently she tested herself today and was positive.  She denies significant nasal congestion or sore throat.  No fever body aches.  No known sick contacts.  Non-smoker.    Objective:     Past Medical History:    Age-related cataract    Chest pain    admitt cp normal w/u    Depression    Dermatofibroma    Essential hypertension    Heart murmur    Hernia, hiatal    High blood pressure    History of cardiac cath    normal    Hives    Osteoarthrosis involving lower leg    Primary osteoarthritis of first carpometacarpal joint of right hand    Tachycardia              Past Surgical History:   Procedure Laterality Date    Colonoscopy  2001, 1/12, 10/17    Colonoscopy N/A 10/19/2017    Procedure: COLONOSCOPY, POSSIBLE BIOPSY, POSSIBLE POLYPECTOMY 21977;  Surgeon: Xavier Hampton MD;  Location: [facility]    Correct bunion,simple      B/L    Egd  01/2012    Esophageal motility study  1/12    Hemorrhoidectomy  1987    Hernia surgery      2013    Other surgical history  3/4/13    [facility] - Dr. Okeefe/ lap repair diag hernia, lap nissen fundoplication    Other surgical history  may 23 2014    Hiatel hernia repair by Dr. Okeefe (open)    Ventral hernia repair N/A 3/26/2015    Procedure: HERNIA VENTRAL REPAIR;  Surgeon: Jim Okeefe MD;  Location:  MAIN OR                Social History     Socioeconomic History    Marital status:    Tobacco Use    Smoking status: Never    Smokeless tobacco: Never   Vaping Use    Vaping status: Never Used   Substance and Sexual Activity    Alcohol use: Yes     Comment: occ~ \"maybe once  or twice per month\"    Drug use: No   Other Topics Concern    Caffeine Concern Yes     Comment: 8-12 oz. of coffee daily.    Exercise Yes     Comment: Walks her dog daily and yoga     Social Drivers of Health     Physical Activity: Insufficiently Active (10/1/2024)    Received from [employer]    Physical Activity     On average, how many days per week do you engage in moderate to strenuous exercise (like a brisk walk)?: 4 days     On average, how many minutes do you engage in exercise at this level?: 30 min     On average, how many minutes do you engage in exercise at this level?: 30 min     On average, how many days per week do you engage in moderate to strenuous exercise (like a brisk walk)?: 4 days   Housing Stability: Not At Risk (10/1/2024)    [facility]     What is your living situation today?: I have a steady place to live                  Physical Exam     ED Triage Vitals [11/02/24 2002]   BP (!) 182/76   Pulse 62   Resp 18   Temp 98.4 °F (36.9 °C)   Temp src Temporal   SpO2 98 %   O2 Device None (Room air)       Current Vitals:   Vital Signs  BP: 135/74  Pulse: 65  Resp: 18  Temp: 98.4 °F (36.9 °C)  Temp src: Temporal  MAP (mmHg): 91    Oxygen Therapy  SpO2: 100 %  O2 Device: None (Room air)        Physical Exam     General: Alert, oriented, no apparent distress  HEENT:  Pupils equal reactive.  Extraocular motions intact. MMM.  Neck: Supple  Lungs: Clear to auscultation bilaterally.  Heart: Regular rate and rhythm.  Abdomen: Soft, nontender.   Skin: No rash.  No edema.  Neurologic: No focal neurologic deficits.  Normal speech pattern  Musculoskeletal: No tenderness or deformity noted.  Full range of motion throughout.      ED Course   Labs Reviewed - No data to display                MDM      Differential diagnosis includes viral URI, bronchitis, walking pneumonia, lobar pneumonia    XR CHEST PA + LAT CHEST (CPT=71046)    Result Date: 11/2/2024  PROCEDURE:  XR CHEST PA +  LAT CHEST (CPT=71046)  INDICATIONS:  c/o cough for 3 wks, tested Covid (+) today, denies ELIE  COMPARISON:  [facility], XR, XR CHEST PA + LAT CHEST (ATS=64089), 1/10/2020, 10:09 PM.  TECHNIQUE:  PA and lateral chest radiographs were obtained.  PATIENT STATED HISTORY: (As transcribed by Technologist)    c/o cough for 3 wks, tested Covid (+) today, denies ELIE.    FINDINGS:  Hyperinflation of the lungs with flattening of the hemidiaphragms is most consistent with COPD.  Cardiac silhouette and pulmonary vasculature within normal limits. No focal consolidation, pneumothorax or pleural effusion.            CONCLUSION:  COPD without focal consolidation.   LOCATION:  [facility]   Dictated by (CST): Amalia Jackson MD on 11/02/2024 at 9:14 PM     Finalized by (CST): Amalia Jackson MD on 11/02/2024 at 9:15 PM        Medications   albuterol (Ventolin HFA) 108 (90 Base) MCG/ACT inhaler 2 puff (2 puffs Inhalation Given 11/2/24 2132)     Paxlovid and Tessalon called to pharmacy.  Patient showed how to use albuterol inhaler.  If cough persists I recommend she follow-up with referral pulmonologist given the findings seen on chest x-ray.      Medical Decision Making      Disposition and Plan     Clinical Impression:  1. COVID-19    2. Chronic obstructive pulmonary disease, unspecified COPD type (HCC)         Disposition:  Discharge  11/2/2024  9:37 pm    Follow-up:  Juliane Guevara MD  [address] [place] [address]   [address] [address]  [phone]    Call            Medications Prescribed:  Current Discharge Medication List        START taking these medications    Details   benzonatate 100 MG Oral Cap Take 1 capsule (100 mg total) by mouth 3 (three) times daily as needed for cough.  Qty: 30 capsule, Refills: 0      nirmatrelvir-ritonavir 300-100 MG Oral Tablet Therapy Pack Take one nirmatrelvir tablet (150mg) with one ritonavir tablet (100mg) together twice daily for 5 days.   (Nirmatrelvir dose is renally adjusted)  Qty: 30 tablet, Refills: 0                  Supplementary Documentation:

## 2024-11-07 ENCOUNTER — TELEPHONE (OUTPATIENT)
Dept: INTERNAL MEDICINE CLINIC | Facility: CLINIC | Age: 79
End: 2024-11-07

## 2024-11-07 DIAGNOSIS — K57.30 DIVERTICULOSIS OF COLON: Primary | ICD-10-CM

## 2024-11-07 NOTE — TELEPHONE ENCOUNTER
PT is asking for SIBO test ordered   And is also wanting to talk to SM about a referral for hearing aids.

## 2024-11-08 NOTE — TELEPHONE ENCOUNTER
I believe the test is called a hydrogen breath test ( [place]). We would have to see if our lab does this test. I can't get it to come up in the orders. If they do not do it then she romero need to see her GI.

## 2024-11-08 NOTE — TELEPHONE ENCOUNTER
S/w pt.  Pt stated SIBO stands for \"small intestine bacterial overgrowth\". Pt wonders if she has this due to her intermittent diarrhea for years. She stated she's been researching it and since when she has diarrhea she notices it is \"fatty diarrhea\". There's a layer of oil/fat in the toilet with the diarrhea.   Pt stated there is a breath test for SIBO and wanted to know if that is something [facility] could order?   Please advise if you are aware of this test or if pt needs to go to GI?     In regards to hearing aids, pt sees audiologist and ENT (Dr. Leger) for hearing loss. She is due for yearly follow up. Advised her to set up appt there to discuss possible Hearing aids. She v/u

## 2024-11-11 RX ORDER — ALBUTEROL SULFATE 90 UG/1
2 INHALANT RESPIRATORY (INHALATION) EVERY 4 HOURS PRN
Qty: 1 EACH | Refills: 0 | Status: SHIPPED | OUTPATIENT
Start: 2024-11-11 | End: 2024-12-11

## 2024-11-11 NOTE — TELEPHONE ENCOUNTER
Albuterol 108  Filled 01/24/18  Qty 1  0 refills  Future Appointments   Date Time Provider Department Center   11/14/2024  2:00 PM Vicky Vann APRN [facility] 8 [facility] [place]   2/17/2025  1:30 PM Wily Johnson MD [facility] DERM [facility] 2-26-24

## 2024-11-11 NOTE — TELEPHONE ENCOUNTER
albuterol (Ventolin HFA) 108 (90 Base) MCG/ACT inhaler 2 puff     [id number] - [place], [place] - [address] [phone], [phone]     Given at hospital visit, pt reports she is running out    Future Appointments   Date Time Provider Department Center   11/14/2024  2:00 PM Vicky Vann APRN [facility] 8 [facility] [place]   2/17/2025  1:30 PM Wily Johnson MD G&B DERM E[facility]

## 2024-11-11 NOTE — TELEPHONE ENCOUNTER
Confirmed with lab and they do not do the GUT-CHEK test.     Patient made aware. Referral to GI specialist entered and provided to patient.

## 2024-11-14 ENCOUNTER — OFFICE VISIT (OUTPATIENT)
Dept: INTERNAL MEDICINE CLINIC | Facility: CLINIC | Age: 79
End: 2024-11-14
Payer: MEDICARE

## 2024-11-14 VITALS
SYSTOLIC BLOOD PRESSURE: 122 MMHG | DIASTOLIC BLOOD PRESSURE: 68 MMHG | HEART RATE: 62 BPM | WEIGHT: 151.81 LBS | HEIGHT: 61 IN | OXYGEN SATURATION: 97 % | TEMPERATURE: 98 F | BODY MASS INDEX: 28.66 KG/M2 | RESPIRATION RATE: 18 BRPM

## 2024-11-14 DIAGNOSIS — I10 PRIMARY HYPERTENSION: ICD-10-CM

## 2024-11-14 DIAGNOSIS — K58.0 IRRITABLE BOWEL SYNDROME WITH DIARRHEA: Primary | ICD-10-CM

## 2024-11-14 DIAGNOSIS — E78.5 HYPERLIPIDEMIA, UNSPECIFIED HYPERLIPIDEMIA TYPE: ICD-10-CM

## 2024-11-14 PROCEDURE — 99214 OFFICE O/P EST MOD 30 MIN: CPT | Performed by: FAMILY MEDICINE

## 2024-11-14 PROCEDURE — 3008F BODY MASS INDEX DOCD: CPT | Performed by: FAMILY MEDICINE

## 2024-11-14 PROCEDURE — 3078F DIAST BP <80 MM HG: CPT | Performed by: FAMILY MEDICINE

## 2024-11-14 PROCEDURE — 1159F MED LIST DOCD IN RCRD: CPT | Performed by: FAMILY MEDICINE

## 2024-11-14 PROCEDURE — 3074F SYST BP LT 130 MM HG: CPT | Performed by: FAMILY MEDICINE

## 2024-11-14 PROCEDURE — 1160F RVW MEDS BY RX/DR IN RCRD: CPT | Performed by: FAMILY MEDICINE

## 2024-11-14 RX ORDER — ALBUTEROL SULFATE AND BUDESONIDE 90; 80 UG/1; UG/1
2 AEROSOL, METERED RESPIRATORY (INHALATION)
COMMUNITY

## 2024-11-14 NOTE — PROGRESS NOTES
CHIEF COMPLAINT:     Chief Complaint   Patient presents with    Diarrhea     Comes nad goes over the past years. Has seen GI. Over past week and half has had diarrhea, has been using imodium.         HPI:   Yue Browne is a 79 year old female .  The patient complaints of diarrhea. Has had for years on and off but recently has been occurring more often. Stool is described as watery and dark. Patient has not had any blood or mucus in the stool. Patient does get some cramping. Has had a few BM's in the past 24 hours. Denies any associated nausea or vomiting.  Patient has been urinating, Appetite is good. The diarrhea is worsened by eating. Patient not had exposure to a stomach virus,food poisoning,food allergy,or antibiotics. Pt wants a SIBO test done. Pt is aware she needs to see GI for that test. Our labs does not do that. Pt given the name of Dr. Chew with [facility]. Pt has been following a BLAND/BRAT diet and drinking gatorade. Pt does state it helps. Pt has also been using Imodium after each loose BM.    Patient presents for recheck of their hypertension/Hyperlipdemia. Pt has been taking medications as instructed, no medication side effects,  Currently asymptomatic, no chest pains, jaw pains, arm pains. No headaches, dizziness or edema.  No SOB on exertion or rest.  Pt has been following a low fat diet and has not been exercising as much do to the diarrhea. Pt is afraid to leave her house incase she has to use the bathroom.      BP Readings from Last 3 Encounters:   11/14/24 122/68   11/02/24 132/75   10/07/24 132/82       Current Outpatient Medications   Medication Sig Dispense Refill    Albuterol-Budesonide (AIRSUPRA) 90-80 MCG/ACT Inhalation Aerosol Inhale 2 puffs into the lungs every 4 to 6 hours as needed.      Calcium 250 MG Oral Cap Take by mouth.      zinc sulfate 220 (50 Zn) MG Oral Cap Take 1 capsule (220 mg total) by mouth daily.      benzonatate 100 MG Oral Cap Take 1 capsule (100 mg total)  by mouth 3 (three) times daily as needed for cough. 30 capsule 0    HYDROXYZINE 25 MG Oral Tab TAKE 2 TABLETS BY MOUTH EVERY NIGHT AT BEDTIME 60 tablet 0    METOPROLOL TARTRATE 50 MG Oral Tab TAKE 1 TABLET BY MOUTH ONE TIME DAILY 90 tablet 0    ergocalciferol 1.25 MG (79968 UT) Oral Cap Take 1 capsule (50,000 Units total) by mouth once a week. 12 capsule 1    ezetimibe 10 MG Oral Tab TAKE ONE TABLET BY MOUTH EVERY EVENING 90 tablet 1    buPROPion  MG Oral Tablet 12 Hr Take 1 tablet (150 mg total) by mouth daily. 90 tablet 1    temazepam 30 MG Oral Cap Take 1 capsule (30 mg total) by mouth nightly as needed for Sleep. AT BEDTIME 90 capsule 0    NON FORMULARY Take 2 capsules by mouth daily. SEED      Omega-3 Fatty Acids (FISH OIL OR) Take 1 capsule by mouth daily.      Cyanocobalamin (VITAMIN B-12 OR) Take by mouth.      albuterol 108 (90 Base) MCG/ACT Inhalation Aero Soln Inhale 2 puffs into the lungs every 4 (four) hours as needed for Wheezing. (Patient not taking: Reported on 11/14/2024) 1 each 0    benzonatate 100 MG Oral Cap Take 1 capsule (100 mg total) by mouth 3 (three) times daily as needed for cough. 30 capsule 0    estradiol 0.1 MG/GM Vaginal Cream Place 1 g vaginally. (Patient not taking: Reported on 10/7/2024)      montelukast 10 MG Oral Tab Take 1 tablet (10 mg total) by mouth nightly. (Patient not taking: Reported on 11/14/2024)      MAGNESIUM OR Take 1 capsule by mouth daily. (Patient not taking: Reported on 11/14/2024)        Past Medical History:    Age-related cataract    Chest pain    admitt cp normal w/u    Depression    Dermatofibroma    Essential hypertension    Heart murmur    Hernia, hiatal    High blood pressure    History of cardiac cath    normal    Hives    Osteoarthrosis involving lower leg    Primary osteoarthritis of first carpometacarpal joint of right hand    Tachycardia      Social History:  Social History     Socioeconomic History    Marital status:    Tobacco Use     Smoking status: Never    Smokeless tobacco: Never   Vaping Use    Vaping status: Never Used   Substance and Sexual Activity    Alcohol use: Yes     Comment: occ~ \"maybe once or twice per month\"    Drug use: No   Other Topics Concern    Caffeine Concern Yes     Comment: 8-12 oz. of coffee daily.    Exercise Yes     Comment: Walks her dog daily and yoga     Social Drivers of Health     Physical Activity: Insufficiently Active (10/1/2024)    Received from [facility]    Physical Activity     On average, how many days per week do you engage in moderate to strenuous exercise (like a brisk walk)?: 4 days     On average, how many minutes do you engage in exercise at this level?: 30 min     On average, how many minutes do you engage in exercise at this level?: 30 min     On average, how many days per week do you engage in moderate to strenuous exercise (like a brisk walk)?: 4 days   Housing Stability: Not At Risk (10/1/2024)    Received from [facility]     What is your living situation today?: I have a steady place to live        REVIEW OF SYSTEMS:   GENERAL: Denies fever, chills,weight change, decreased appetite  SKIN: Denies rashes, skin wounds or ulcers.  EYES: Denies blurred vision or double vision  HENT: Denies congestion, rhinorrhea, sore throat or ear pain  CHEST: Denies chest pain, or palpitations  LUNGS: Denies shortness of breath, cough, or wheezing  GI: Denies abdominal pain, see HPI  MUSCULOSKELETAL: no arthralgia or swollen joints  LYMPH:  Denies lymphadenopathy  NEURO: Denies headaches or lightheadedness      EXAM:   /68 (BP Location: Left arm, Patient Position: Sitting, Cuff Size: adult)   Pulse 62   Temp 97.5 °F (36.4 °C) (Temporal)   Resp 18   Ht 5' 1\" (1.549 m)   Wt 151 lb 12.8 oz (68.9 kg)   LMP 03/16/2000 (Approximate)   SpO2 97%   BMI 28.68 kg/m²   GENERAL: well developed, well nourished,in no apparent distress  SKIN: no rashes,no suspicious lesions. Skin turgor  elastic.  HEAD: atraumatic, normocephalic  EYES: conjunctiva clear.  Throat- oral mucus membranes moist  NECK: supple, non-tender  LUNGS: clear to auscultation bilaterally, no wheezes or rhonchi. Breathing is non labored.  CARDIO: RRR without murmur  GI: No visible scars, or masses. BS's present x4. No palpable masses or hepatosplenomegaly.  no tenderness on palpation in all quads  EXTREMITIES: no cyanosis, clubbing or edema  LYMPH:  no lymphadenopathy.      Physical Exam    ASSESSMENT AND PLAN:     Encounter Diagnoses   Name Primary?    Irritable bowel syndrome with diarrhea Yes    Primary hypertension     Hyperlipidemia, unspecified hyperlipidemia type        No orders of the defined types were placed in this encounter.      Meds & Refills for this Visit:  Requested Prescriptions      No prescriptions requested or ordered in this encounter       Imaging & Consults:  None    PLAN:    1. Irritable bowel syndrome with diarrhea  - drink plenty of fluids.  - [place] diet, avoid dairy and any other foods that trigger your symptoms.  - Eating a diet low in FODMAP  - avoid caffeine,alcohol   - Increase fiber in the diet   - reduce stress levels  - use the Imodium as needed.  - Pt to f/u with GI as discussed      2. Primary hypertension  Conservative measures dicussed. Continue medication.  Diet and exercise explained and encouraged.  Home blood pressure monitoring. Pt should measure BP’s two to three times per week. Goal blood pressure at home - < 135/85.       3. Hyperlipidemia, unspecified hyperlipidemia type  Pt to continue their medication, increase exercise,  choose better fats,  increase fiber and avoid processed foods.      The patient indicates understanding of these issues and agrees to the plan.  The patient is asked to return in Feb 2025 for her MA. Sooner if needed.    I spent 30 minutes preparing to see the patient,reviewing patient's chart,results,history,medical decision making,placing  orders,counseling/coordinating care and documenting in the chart.

## 2024-12-04 DIAGNOSIS — F41.1 GENERALIZED ANXIETY DISORDER: ICD-10-CM

## 2024-12-04 DIAGNOSIS — F33.9 EPISODE OF RECURRENT MAJOR DEPRESSIVE DISORDER, UNSPECIFIED DEPRESSION EPISODE SEVERITY (HCC): ICD-10-CM

## 2024-12-04 RX ORDER — BUPROPION HYDROCHLORIDE 150 MG/1
150 TABLET, EXTENDED RELEASE ORAL DAILY
Qty: 90 TABLET | Refills: 0 | Status: SHIPPED | OUTPATIENT
Start: 2024-12-04

## 2024-12-04 NOTE — TELEPHONE ENCOUNTER
Bupropion  mg  Filled 2-26-24  Qty 90  1 refill  Future Appointments   Date Time Provider Department Center   1/29/2025 10:40 AM Bree Chew DO [facility]   2/17/2025  1:30 PM Wily Johnson MD [facility] D[facility] 2-26-24

## 2024-12-08 DIAGNOSIS — I10 ESSENTIAL HYPERTENSION: ICD-10-CM

## 2024-12-08 DIAGNOSIS — I10 PRIMARY HYPERTENSION: ICD-10-CM

## 2024-12-09 RX ORDER — METOPROLOL TARTRATE 50 MG
50 TABLET ORAL DAILY
Qty: 90 TABLET | Refills: 0 | Status: SHIPPED | OUTPATIENT
Start: 2024-12-09

## 2025-01-13 DIAGNOSIS — E78.5 HYPERLIPIDEMIA, UNSPECIFIED HYPERLIPIDEMIA TYPE: ICD-10-CM

## 2025-01-14 RX ORDER — EZETIMIBE 10 MG/1
10 TABLET ORAL EVERY EVENING
Qty: 90 TABLET | Refills: 0 | Status: SHIPPED | OUTPATIENT
Start: 2025-01-14

## 2025-01-14 NOTE — TELEPHONE ENCOUNTER
Ezetimibe 10 mg  Filled 7-10-24  Qty 90  1 refill  Future Appointments   Date Time Provider Department Center   1/29/2025 10:40 AM Bree Chew DO [facility]   2/17/2025  1:30 PM Wily Johnson MD [facility]   LOV 11-14-24 [facility]  Labs 8-26-24 Lipid

## 2025-01-29 PROBLEM — M81.0 AGE-RELATED OSTEOPOROSIS WITHOUT CURRENT PATHOLOGICAL FRACTURE: Status: ACTIVE | Noted: 2023-10-09

## 2025-02-19 RX ORDER — ERGOCALCIFEROL 1.25 MG/1
50000 CAPSULE, LIQUID FILLED ORAL WEEKLY
Qty: 12 CAPSULE | Refills: 0 | OUTPATIENT
Start: 2025-02-19

## 2025-02-19 NOTE — TELEPHONE ENCOUNTER
Ergocalciferol 1.25 mg  Filled 8-28-24  Qty 12  1 refill  Future Appointments   Date Time Provider Department Center   4/29/2025  2:00 PM Inessa Red APRN [facility]   2/23/2026  2:00 PM Wily Johnson MD [facility]   LOV 2-26-24   Labs: DUE FOR REPEAT LAB WORK

## 2025-02-23 DIAGNOSIS — G47.00 INSOMNIA, UNSPECIFIED TYPE: ICD-10-CM

## 2025-02-23 RX ORDER — TEMAZEPAM 30 MG/1
30 CAPSULE ORAL NIGHTLY PRN
Qty: 90 CAPSULE | Refills: 0 | Status: SHIPPED | OUTPATIENT
Start: 2025-02-23

## 2025-02-27 ENCOUNTER — LAB ENCOUNTER (OUTPATIENT)
Dept: LAB | Age: 80
End: 2025-02-27
Attending: INTERNAL MEDICINE
Payer: MEDICARE

## 2025-02-27 DIAGNOSIS — R19.7 DIARRHEA, UNSPECIFIED TYPE: ICD-10-CM

## 2025-02-27 LAB — VIT D+METAB SERPL-MCNC: 59.6 NG/ML (ref 30–100)

## 2025-02-27 PROCEDURE — 83993 ASSAY FOR CALPROTECTIN FECAL: CPT

## 2025-02-27 PROCEDURE — 36415 COLL VENOUS BLD VENIPUNCTURE: CPT

## 2025-02-27 PROCEDURE — 82784 ASSAY IGA/IGD/IGG/IGM EACH: CPT

## 2025-02-27 PROCEDURE — 86364 TISS TRNSGLTMNASE EA IG CLAS: CPT

## 2025-02-28 LAB
IGA: 99 MG/DL
IGA: 99 MG/DL

## 2025-03-01 LAB — TTG IGA SER-ACNC: <0.2 U/ML (ref ?–7)

## 2025-03-02 LAB — CALPROTECTIN STL-MCNT: <5 ΜG/G (ref ?–50)

## 2025-03-03 DIAGNOSIS — I10 PRIMARY HYPERTENSION: ICD-10-CM

## 2025-03-03 DIAGNOSIS — I10 ESSENTIAL HYPERTENSION: ICD-10-CM

## 2025-03-04 RX ORDER — METOPROLOL TARTRATE 50 MG
50 TABLET ORAL DAILY
Qty: 90 TABLET | Refills: 0 | Status: SHIPPED | OUTPATIENT
Start: 2025-03-04

## 2025-03-04 NOTE — TELEPHONE ENCOUNTER
Metoprolol 50 mg  Filled 12-9-24  Qty 90  0 refills  Future Appointments   Date Time Provider Department Center   4/29/2025  2:00 PM Inessa Red APRN [facility]   2/23/2026  2:00 PM Wily Johnson MD [facility] 11-14-24

## 2025-04-07 NOTE — TELEPHONE ENCOUNTER
Refill requested:   Requested Prescriptions     Pending Prescriptions Disp Refills   • TEMAZEPAM 30 MG Oral Cap [Pharmacy Med Name: Temazepam Oral Capsule 30 MG] 30 capsule 0     Sig: TAKE 1 CAPSULE BY MOUTH EVERY EVENING AS NEEDED FOR SLEEP       Failed p Patient here for AF ablation and Watchman.

## 2025-04-14 DIAGNOSIS — E78.5 HYPERLIPIDEMIA, UNSPECIFIED HYPERLIPIDEMIA TYPE: ICD-10-CM

## 2025-04-15 RX ORDER — EZETIMIBE 10 MG/1
10 TABLET ORAL EVERY EVENING
Qty: 90 TABLET | Refills: 0 | OUTPATIENT
Start: 2025-04-15

## 2025-04-20 DIAGNOSIS — E78.5 HYPERLIPIDEMIA, UNSPECIFIED HYPERLIPIDEMIA TYPE: ICD-10-CM

## 2025-04-20 DIAGNOSIS — F33.9 EPISODE OF RECURRENT MAJOR DEPRESSIVE DISORDER, UNSPECIFIED DEPRESSION EPISODE SEVERITY: ICD-10-CM

## 2025-04-20 DIAGNOSIS — F41.1 GENERALIZED ANXIETY DISORDER: ICD-10-CM

## 2025-04-21 PROBLEM — K58.0 IRRITABLE BOWEL SYNDROME WITH DIARRHEA: Status: ACTIVE | Noted: 2025-04-21

## 2025-04-21 NOTE — TELEPHONE ENCOUNTER
Bupropion  mg  Filled 12-4-24  Qty 90  0 refills  Future Appointments   Date Time Provider Department Center   4/22/2025  2:00 PM Vicky Vann APRN [facility] 8 [facility] [place]   4/28/2025  3:00 PM Nadine Juarez LCSW, CADC [facility]   4/29/2025  2:00 PM Inessa Red APRN [facility]   5/29/2025  2:15 PM EMG AUDIO [facility] EMGAUD[facility] [id number]   5/29/2025  2:30 PM Parker Leger MD EMGOTONAPER [id number]   2/23/2026  2:00 PM Wily Johnson MD [facility]   LOV 2-26-24     Ezetimibe 10 mg  Filled 1-14-25  Qty 90  0 refills  Future Appointments   Date Time Provider Department Center   4/22/2025  2:00 PM Vicky Vann APRN [facility] 8 [facility] [place]   4/28/2025  3:00 PM Nadine Juarez LCSW, CADC [facility]IBGB [facility]   4/29/2025  2:00 PM Inessa Red APRN [facility]   5/29/2025  2:15 PM EMG AUDIO [place] EMGAUDIONAPE [id number]   5/29/2025  2:30 PM Parker Leger MD EMGOTONAPER [id number]   2/23/2026  2:00 PM Wily Johnson MD [facility]   LOV 11-14-24 [facility]

## 2025-04-21 NOTE — MR AVS SNAPSHOT
After Visit Summary   5/11/2017    [facility]    MRN: [id number]           Diagnoses this Visit     Idiopathic urticaria    -  Primary       Allergies     Ampicillin-Sulbactam Sodium Rash    Darvocet [Propoxyphene N-Apap] Other (See Comm Patient arrived to Non-Invasive Cardiology Lab for Out Patient [facility] Procedure. Staff introduced to patient. Patient identifiers verified with Name and Date of Birth. Procedure verified with patient. Consent forms reviewed and signed by patient or authorized representative and verified. Allergies verified. Patient informed of procedure and plan of care. Questions answered with review.     Patient on cardiac monitor, non-invasive blood pressure, SPO2 monitor. Patient is A&Ox3. Patient reports no complaints. Patient belongings and valuables to remain in the room with patient.    Patient on stretcher, in low position, with side rails up and brakes locked. Patient instructed to call for assistance as needed.    Family in waiting room.     If a lab draw is part of your appointment, please arrive 15 minutes early.       To Do List     Wednesday June 07, 2017 11:30 AM     Appointment with Jordan Salinas at [address] [place] [address] Gastroenterology ([phone])   [address] [facility]

## 2025-04-22 RX ORDER — BUPROPION HYDROCHLORIDE 150 MG/1
150 TABLET, EXTENDED RELEASE ORAL DAILY
Qty: 90 TABLET | Refills: 0 | OUTPATIENT
Start: 2025-04-22

## 2025-04-22 RX ORDER — EZETIMIBE 10 MG/1
10 TABLET ORAL EVERY EVENING
Qty: 90 TABLET | Refills: 0 | OUTPATIENT
Start: 2025-04-22

## 2025-04-24 ENCOUNTER — OFFICE VISIT (OUTPATIENT)
Dept: INTERNAL MEDICINE CLINIC | Facility: CLINIC | Age: 80
End: 2025-04-24
Payer: MEDICARE

## 2025-04-24 VITALS
BODY MASS INDEX: 28.96 KG/M2 | WEIGHT: 157.38 LBS | HEART RATE: 74 BPM | RESPIRATION RATE: 16 BRPM | SYSTOLIC BLOOD PRESSURE: 130 MMHG | OXYGEN SATURATION: 97 % | TEMPERATURE: 97 F | DIASTOLIC BLOOD PRESSURE: 64 MMHG | HEIGHT: 62 IN

## 2025-04-24 DIAGNOSIS — L50.1 IDIOPATHIC URTICARIA: ICD-10-CM

## 2025-04-24 DIAGNOSIS — L30.8 PERIVASCULAR DERMATITIS: ICD-10-CM

## 2025-04-24 DIAGNOSIS — K58.0 IRRITABLE BOWEL SYNDROME WITH DIARRHEA: ICD-10-CM

## 2025-04-24 DIAGNOSIS — I70.0 ATHEROSCLEROSIS OF ABDOMINAL AORTA: ICD-10-CM

## 2025-04-24 DIAGNOSIS — N18.31 STAGE 3A CHRONIC KIDNEY DISEASE (HCC): Chronic | ICD-10-CM

## 2025-04-24 DIAGNOSIS — M19.012 PRIMARY OSTEOARTHRITIS OF LEFT SHOULDER: ICD-10-CM

## 2025-04-24 DIAGNOSIS — Z00.00 ENCOUNTER FOR ANNUAL HEALTH EXAMINATION: Primary | ICD-10-CM

## 2025-04-24 DIAGNOSIS — D33.3 RIGHT ACOUSTIC NEUROMA (HCC): ICD-10-CM

## 2025-04-24 DIAGNOSIS — M81.0 AGE-RELATED OSTEOPOROSIS WITHOUT CURRENT PATHOLOGICAL FRACTURE: ICD-10-CM

## 2025-04-24 DIAGNOSIS — R73.01 IMPAIRED FASTING GLUCOSE: ICD-10-CM

## 2025-04-24 DIAGNOSIS — K21.00 GASTROESOPHAGEAL REFLUX DISEASE WITH ESOPHAGITIS WITHOUT HEMORRHAGE: ICD-10-CM

## 2025-04-24 DIAGNOSIS — K44.9 DIAPHRAGMATIC HERNIA WITHOUT OBSTRUCTION AND WITHOUT GANGRENE: ICD-10-CM

## 2025-04-24 DIAGNOSIS — J30.9 ALLERGIC RHINITIS, UNSPECIFIED SEASONALITY, UNSPECIFIED TRIGGER: ICD-10-CM

## 2025-04-24 DIAGNOSIS — E78.5 HYPERLIPIDEMIA, UNSPECIFIED HYPERLIPIDEMIA TYPE: ICD-10-CM

## 2025-04-24 DIAGNOSIS — N95.2 POSTMENOPAUSAL ATROPHIC VAGINITIS: ICD-10-CM

## 2025-04-24 DIAGNOSIS — R32 URINARY INCONTINENCE, UNSPECIFIED TYPE: ICD-10-CM

## 2025-04-24 DIAGNOSIS — Z96.1 PSEUDOPHAKIA OF LEFT EYE: ICD-10-CM

## 2025-04-24 DIAGNOSIS — I27.20 PULMONARY HYPERTENSION (HCC): ICD-10-CM

## 2025-04-24 DIAGNOSIS — G43.009 MIGRAINE WITHOUT AURA AND WITHOUT STATUS MIGRAINOSUS, NOT INTRACTABLE: ICD-10-CM

## 2025-04-24 DIAGNOSIS — K57.30 DIVERTICULOSIS OF COLON: ICD-10-CM

## 2025-04-24 DIAGNOSIS — G47.00 INSOMNIA, UNSPECIFIED TYPE: ICD-10-CM

## 2025-04-24 DIAGNOSIS — F41.1 GENERALIZED ANXIETY DISORDER: ICD-10-CM

## 2025-04-24 DIAGNOSIS — I10 PRIMARY HYPERTENSION: ICD-10-CM

## 2025-04-24 DIAGNOSIS — E55.9 VITAMIN D DEFICIENCY: ICD-10-CM

## 2025-04-24 DIAGNOSIS — F33.9 EPISODE OF RECURRENT MAJOR DEPRESSIVE DISORDER, UNSPECIFIED DEPRESSION EPISODE SEVERITY: ICD-10-CM

## 2025-04-24 RX ORDER — EZETIMIBE 10 MG/1
10 TABLET ORAL EVERY EVENING
Qty: 90 TABLET | Refills: 0 | Status: SHIPPED | OUTPATIENT
Start: 2025-04-24

## 2025-04-24 RX ORDER — BUPROPION HYDROCHLORIDE 150 MG/1
150 TABLET, EXTENDED RELEASE ORAL DAILY
Qty: 90 TABLET | Refills: 0 | Status: SHIPPED | OUTPATIENT
Start: 2025-04-24

## 2025-04-24 NOTE — PROGRESS NOTES
Subjective:   Yue Browne is a 79 year old female who presents for a MA AHA (Medicare Advantage Annual Health Assessment) and Subsequent Annual Wellness visit (Pt already had Initial Annual Wellness) and scheduled follow up of multiple significant but stable problems.   History of Present Illness            Patient presents for recheck of their hypertension/hyperlipidemia. Pt has been taking medications as instructed, no medication side effects, home BP monitoring has not been done  Currently asymptomatic, no chest pains, jaw pains, arm pains. No headaches, dizziness or edema.  No SOB on exertion or rest.  Pt has been following a low fat diet and has been exercising at least .     The patient is here for a recheck on insomnia. The insomnia medication has improved the difficulty falling asleep/waking in the middle of the night and the difficulty falling back asleep. The patient states the temazepam is working well and would like to continue the medication. Pt has not had any side effects from the medication. Pt uses the medication as needed.     Pt is here for a recheck of anxiety/depression. Has been tolerating the meds well. Denies any side effects from the meds. Mood has been good. Patient's appetite is good.Pt denies headaches,tics,or insomnia.No depressive symptoms or suicidal ideations. Would like to continue the same medication. Pt needs a refill on her bupropion.    BP Readings from Last 3 Encounters:   04/24/25 130/64   01/29/25 110/70   11/14/24 122/68       History/Other:   Fall Risk Assessment:   She has been screened for Falls and is High Risk. Fall Prevention information provided to patient in After Visit Summary.    Do you feel unsteady when standing or walking?: No  Do you worry about falling?: No  Have you fallen in the past year?: Yes  How many times have you fallen?: 1  Were you injured?: Yes     Cognitive Assessment:   She had a completely normal cognitive assessment - see flowsheet  entries       Functional Ability/Status:   Yue Browne has a completely normal functional assessment. See flowsheet for details.      Depression Screening (PHQ):  PHQ-2 SCORE: 1  , done 4/24/2025   Feeling down, depressed, or hopeless: 1    Last [place] Suicide Screening on 4/24/2025 was No Risk.     5 minutes spent screening and counseling for depression    Advanced Directives:   She does have a Living Will but we do NOT have it on file in Epic.    She does have a POA but we do NOT have it on file in [facility].    Patient has Advance Care Planning documents but we do not have a copy in EMR. Discussed Advanced Care Planning with patient and instructed patient to get our office a copy to be scanned into EMR.      Problem List[1]  Allergies:  She is allergic to sulfasalazine, ampicillin-sulbactam sodium, darvocet [propoxyphene n-apap], sulfa drugs cross reactors, and pravastatin.    Current Medications:  Active Meds, Sig Only[2]    Medical History:  She  has a past medical history of Abrasions of multiple sites (07/01/2015), Age-related cataract (10/01/2020), Anxiety, Change in hair, Chest pain, Depression, Dermatofibroma (10/01/2020), Diarrhea, unspecified, Disorder of bone and articular cartilage (11/05/2014), Essential hypertension, Hearing loss, Heart murmur, Heart palpitations, Hernia, hiatal, High blood pressure, History of cardiac cath (03/2010), History of cardiac murmur, History of eating disorder, Hives, Irregular bowel habits, Osteoarthrosis involving lower leg (03/03/2015), Primary osteoarthritis of first carpometacarpal joint of right hand (07/07/2017), Stress, and Tachycardia.  Surgical History:  She  has a past surgical history that includes colonoscopy (2001, 1/12, 10/17); correct bunion,simple; hemorrhoidectomy (1987); other surgical history (3/4/13); other surgical history (may 23 2014); hernia surgery; Ventral hernia repair (N/A, 3/26/2015); esophageal motility study (1/12); colonoscopy (N/A,  10/19/2017); and egd (01/2012).   Family History:  Her family history includes Breast Cancer in her maternal grandmother; Cancer in her maternal grandmother; Heart Disease in her father; Stroke in her mother; hypothyroidism in her mother.  Social History:  She  reports that she has never smoked. She has never used smokeless tobacco. She reports current alcohol use. She reports that she does not use drugs.    Tobacco:  She has never smoked tobacco.    CAGE Alcohol Screen:   CAGE screening score of 0 on 4/24/2025, showing low risk of alcohol abuse.      Patient Care Team:  Flakita Anders MD as PCP - General (Family Practice)  Xavier Hampton MD (GASTROENTEROLOGY)  Bree Chew DO (GASTROENTEROLOGY)    Review of Systems  GENERAL: feels well otherwise  SKIN: denies any unusual skin lesions  EYES: denies blurred vision or double vision  HEENT: denies nasal congestion, sinus pain or ST  LUNGS: denies shortness of breath with exertion,+HTN  CARDIOVASCULAR: denies chest pain on exertion  GI: denies abdominal pain, +GERD  : denies dysuria, vaginal discharge or itching,+ atrophic vaginitis  MUSCULOSKELETAL: denies back pain,+OA left shoulder  NEURO:+ migraine headaches  PSYCHE: + depression and anxiety, insomnia  HEMATOLOGIC: denies hx of anemia  ENDOCRINE: denies thyroid history,Vit D def  ALL/ASTHMA: +allergies,urticaria     Objective:   Physical Exam  General Appearance:  Alert, cooperative, no distress, appears stated age   Head:  Normocephalic, without obvious abnormality, atraumatic   Eyes:  PERRL, conjunctiva/corneas clear, EOM's intact both eyes   Ears:  Normal TM's and external ear canals, both ears   Nose: Nares normal, septum midline,mucosa normal, no drainage or sinus tenderness   Throat: Lips, mucosa, and tongue normal; teeth and gums normal   Neck: Supple, symmetrical, trachea midline, no adenopathy;  thyroid: not enlarged, symmetric, no tenderness/mass/nodules; no carotid bruit or JVD   Back:    Symmetric, no curvature, ROM normal, no CVA tenderness   Lungs:   Clear to auscultation bilaterally, respirations unlabored   Heart:  Regular rate and rhythm, S1 and S2 normal, no murmur, rub, or gallop   Abdomen:   Soft, non-tender, bowel sounds active all four quadrants,  no masses, no organomegaly   Pelvic: Deferred   Extremities: Extremities normal, atraumatic, no cyanosis or edema   Pulses: 2+ and symmetric   Skin: Skin color, texture, turgor normal, no rashes or lesions   Lymph nodes: Cervical, supraclavicular, and axillary nodes normal   Neurologic: Normal       /64   Pulse 74   Temp 97.3 °F (36.3 °C) (Temporal)   Resp 16   Ht 5' 2\" (1.575 m)   Wt 157 lb 6.4 oz (71.4 kg)   LMP 03/16/2000 (Approximate)   SpO2 97%   BMI 28.79 kg/m²  Estimated body mass index is 28.79 kg/m² as calculated from the following:    Height as of this encounter: 5' 2\" (1.575 m).    Weight as of this encounter: 157 lb 6.4 oz (71.4 kg).    Medicare Hearing Assessment:   Hearing Screening    Time taken: 4/24/2025  2:24 PM  Entry User: Anabel Hauser  Screening Method: Finger Rub  Finger Rub Result: Pass               Assessment & Plan:   Yue Browne is a 79 year old female who presents for a Medicare Assessment.     1. Encounter for annual health examination (Primary)  2. Pulmonary hypertension (HCC)  Overview:  On Echo 1/2020  3. Hyperlipidemia, unspecified hyperlipidemia type  -     Comp Metabolic Panel (14); Future; Expected date: 04/24/2025  -     Lipid Panel; Future; Expected date: 04/24/2025  -     Ezetimibe; Take 1 tablet (10 mg total) by mouth every evening.  Dispense: 90 tablet; Refill: 0  -     Expanded, Low Complexity (41465)  4. Impaired fasting glucose  -     Hemoglobin A1C; Future; Expected date: 04/24/2025  5. Episode of recurrent major depressive disorder, unspecified depression episode severity  -     buPROPion HCl ER (SR); Take 1 tablet (150 mg total) by mouth daily.  Dispense: 90 tablet; Refill:  0  -     Expanded, Low Complexity (53707)  6. Generalized anxiety disorder  -     buPROPion HCl ER (SR); Take 1 tablet (150 mg total) by mouth daily.  Dispense: 90 tablet; Refill: 0  -     Expanded, Low Complexity (32712)  7. Stage 3a chronic kidney disease (HCC)  8. Right acoustic neuroma (HCC)  9. Age-related osteoporosis without current pathological fracture  10. Diverticulosis of colon  11. Postmenopausal atrophic vaginitis  12. Idiopathic urticaria  13. Primary hypertension  14. Migraine without aura and without status migrainosus, not intractable  15. Diaphragmatic hernia without obstruction and without gangrene  16. Urinary incontinence, unspecified type  Overview:  Recorded Elsewhere: No; Location: [facility]; Source: EHR  17. Perivascular dermatitis  18. Atherosclerosis of abdominal aorta  19. Irritable bowel syndrome with diarrhea  20. Allergic rhinitis, unspecified seasonality, unspecified trigger  21. Insomnia, unspecified type  22. Vitamin D deficiency  23. Gastroesophageal reflux disease with esophagitis without hemorrhage  24. Primary osteoarthritis of left shoulder  25. Pseudophakia of left eye      PLAN:  1. Pulmonary hypertension (HCC)  Stable, no issues    2. Hyperlipidemia, unspecified hyperlipidemia type  Pt to continue their medication, increase exercise,  choose better fats,  increase fiber and avoid processed foods.  - Comp Metabolic Panel (14); Future  - Lipid Panel; Future  - ezetimibe 10 MG Oral Tab; Take 1 tablet (10 mg total) by mouth every evening.  Dispense: 90 tablet; Refill: 0  - Expanded, Low Complexity (21501)    3. Impaired fasting glucose  Pt to watch starchy/carb foods as they can add to sugar load and try to increase activity especially walking as you will burn up the sugars better.  - Hemoglobin A1C; Future    4. Episode of recurrent major depressive disorder, unspecified depression episode severity  - stable, continue medication. Pt is also seeing a counselor  now.  - buPROPion  MG Oral Tablet 12 Hr; Take 1 tablet (150 mg total) by mouth daily.  Dispense: 90 tablet; Refill: 0  - Expanded, Low Complexity (95830)    5. Generalized anxiety disorder  - stable, continue medication. Pt is also seeing a counselor now.  - buPROPion  MG Oral Tablet 12 Hr; Take 1 tablet (150 mg total) by mouth daily.  Dispense: 90 tablet; Refill: 0  - Expanded, Low Complexity (03290)    6. Encounter for annual health examination  Stable well adult. Follow up in one year for annual exam    7. Stage 3a chronic kidney disease (HCC)  -continue to monitor labs    8. Right acoustic neuroma (HCC)  Managed by Dr. Leegr    9. Age-related osteoporosis without current pathological fracture  Managed by her gyne Dr. Hernández    10. Diverticulosis of colon  Managed by Dr. Chew    11. Postmenopausal atrophic vaginitis  Managed by her gyne Dr. Hernández    12. Idiopathic urticaria  Managed by Dr. Nice. Uses Atarax when needed    13. Primary hypertension  Conservative measures dicussed. Continue medication.  Diet and exercise explained and encouraged.  Home blood pressure monitoring. Pt should measure BP’s two to three times per week. Goal blood pressure at home - < 135/85.     14. Migraine without aura and without status migrainosus, not intractable  - stable, continue Imitrex as needed     15. Diaphragmatic hernia without obstruction and without gangrene  Managed by Dr. Chew    16. Urinary incontinence, unspecified type  Managed by her gyne Dr. Hernández    17. Perivascular dermatitis  Managed by Dr. Manuel    18. Atherosclerosis of abdominal aorta  stable, continue to monitor and work on getting lipids under better control     19. Irritable bowel syndrome with diarrhea  Managed by     20. Allergic rhinitis, unspecified seasonality, unspecified trigger  Managed by Dr. Nice    21. Insomnia, unspecified type  Stable, uses temazepam as needed    22. Vitamin D deficiency  Monitor level, take  vit D supplement of at least 2000IU daily    23. Gastroesophageal reflux disease with esophagitis without hemorrhage  Managed by Dr. Chew    24. Primary osteoarthritis of left shoulder  Stable, no current issues. Pt will see Dr. Cabrales if it acts up.    25. Pseudophakia of left eye  Managed by [facility], Dr. Flor Walls        The patient indicates understanding of these issues and agrees to the plan.  Continue with current treatment plan.  Follow up in  6  month(s).  Lab work ordered.  Prescription medication ordered.  Reinforced healthy diet, lifestyle, and exercise.      Return in 6 months (on 10/24/2025).     HERB Moser, 4/24/2025     Supplementary Documentation:   General Health:  In the past six months, have you lost more than 10 pounds without trying?: 2 - No  Has your appetite been poor?: No  Type of Diet: Vegetarian  How does the patient maintain a good energy level?: Appropriate Exercise  How would you describe your daily physical activity?: Moderate  How would you describe your current health state?: Good  How do you maintain positive mental well-being?: Social Interaction, Puzzles, Games, Visiting Friends, Visiting Family  On a scale of 0 to 10, with 0 being no pain and 10 being severe pain, what is your pain level?: 0 - (None)  In the past six months, have you experienced urine leakage?: 0-No  At any time do you feel concerned for the safety/well-being of yourself and/or your children, in your home or elsewhere?: No  Have you had any immunizations at another office such as Influenza, Hepatitis B, Tetanus, or Pneumococcal?: No    Health Maintenance   Topic Date Due    Annual Well Visit  01/01/2025    Annual Depression Screening  01/01/2025    Mammogram  10/14/2025    Influenza Vaccine  Completed    DEXA Scan  Completed    Fall Risk Screening (Annual)  Completed    Pneumococcal Vaccine: 50+ Years  Completed    Zoster Vaccines  Completed    COVID-19 Vaccine  Completed    Meningococcal B  Vaccine  Aged Out            [1]   Patient Active Problem List  Diagnosis    Hyperlipemia    GERD (gastroesophageal reflux disease)    Hypertension    Insomnia    Idiopathic urticaria    Generalized anxiety disorder    Diverticulosis of colon    Diaphragmatic hernia    Allergic rhinitis    Depression, major, recurrent    Atherosclerosis of abdominal aorta    Migraine without aura or status migrainosus    Pulmonary hypertension (HCC)    Postmenopausal atrophic vaginitis    CKD (chronic kidney disease) stage 3, GFR 30-59 ml/min (HCC)    Perivascular dermatitis    Osteoarthritis of left shoulder    Right acoustic neuroma (HCC)    Pseudophakia of left eye    Vitamin D deficiency    Urinary incontinence    Age-related osteoporosis without current pathological fracture    Irritable bowel syndrome with diarrhea   [2]   Outpatient Medications Marked as Taking for the 4/24/25 encounter (Office Visit) with Vicky Vann APRN   Medication Sig    ezetimibe 10 MG Oral Tab Take 1 tablet (10 mg total) by mouth every evening.    buPROPion  MG Oral Tablet 12 Hr Take 1 tablet (150 mg total) by mouth daily.    hydrOXYzine 25 MG Oral Tab Take 2 tablets (50 mg total) by mouth at bedtime.    METOPROLOL TARTRATE 50 MG Oral Tab TAKE 1 TABLET BY MOUTH ONE TIME DAILY    temazepam 30 MG Oral Cap Take 1 capsule (30 mg total) by mouth nightly as needed for Sleep. AT BEDTIME. ANNUAL WELLNESS DUE!    Calcium 250 MG Oral Cap Take by mouth.    zinc sulfate 220 (50 Zn) MG Oral Cap Take 1 capsule (220 mg total) by mouth daily.    estradiol 0.1 MG/GM Vaginal Cream Place 1 g vaginally.    NON FORMULARY Take 2 capsules by mouth daily. SEED    montelukast 10 MG Oral Tab Take 1 tablet (10 mg total) by mouth nightly.    Omega-3 Fatty Acids (FISH OIL OR) Take 1 capsule by mouth daily.    MAGNESIUM OR Take 1 capsule by mouth daily.    Cyanocobalamin (VITAMIN B-12 OR) Take by mouth.

## 2025-05-08 ENCOUNTER — HOSPITAL ENCOUNTER (OUTPATIENT)
Age: 80
Discharge: HOME | End: 2025-05-08
Attending: EMERGENCY MEDICINE
Payer: MEDICARE

## 2025-05-08 ENCOUNTER — APPOINTMENT (OUTPATIENT)
Dept: CT IMAGING | Age: 80
End: 2025-05-08
Attending: EMERGENCY MEDICINE
Payer: MEDICARE

## 2025-05-08 VITALS
OXYGEN SATURATION: 100 % | HEART RATE: 68 BPM | TEMPERATURE: 100 F | SYSTOLIC BLOOD PRESSURE: 132 MMHG | DIASTOLIC BLOOD PRESSURE: 78 MMHG | RESPIRATION RATE: 22 BRPM

## 2025-05-08 DIAGNOSIS — K57.92 ACUTE DIVERTICULITIS: Primary | ICD-10-CM

## 2025-05-08 LAB
#MXD IC: 1.1 X10ˆ3/UL (ref 0.1–1)
BASOPHILS # BLD AUTO: 0.03 X10(3) UL (ref 0–0.2)
BASOPHILS NFR BLD AUTO: 0.3 %
BILIRUB UR QL STRIP: NEGATIVE
BUN BLD-MCNC: 18 MG/DL (ref 7–18)
CHLORIDE BLD-SCNC: 101 MMOL/L (ref 98–112)
CLARITY UR: CLEAR
CO2 BLD-SCNC: 24 MMOL/L (ref 21–32)
COLOR UR: YELLOW
CREAT BLD-MCNC: 1.1 MG/DL (ref 0.55–1.02)
EGFRCR SERPLBLD CKD-EPI 2021: 51 ML/MIN/1.73M2 (ref 60–?)
EOSINOPHIL # BLD AUTO: 0.22 X10(3) UL (ref 0–0.7)
EOSINOPHIL NFR BLD AUTO: 2 %
ERYTHROCYTE [DISTWIDTH] IN BLOOD BY AUTOMATED COUNT: 12.2 %
GLUCOSE BLD-MCNC: 95 MG/DL (ref 70–99)
GLUCOSE UR STRIP-MCNC: NEGATIVE MG/DL
HCT VFR BLD AUTO: 40.7 % (ref 35–48)
HCT VFR BLD AUTO: 41.2 % (ref 35–48)
HCT VFR BLD CALC: 42 % (ref 34–50)
HGB BLD-MCNC: 13.3 G/DL (ref 12–16)
HGB BLD-MCNC: 14 G/DL (ref 12–16)
HGB UR QL STRIP: NEGATIVE
IMM GRANULOCYTES # BLD AUTO: 0.03 X10(3) UL (ref 0–1)
IMM GRANULOCYTES NFR BLD: 0.3 %
ISTAT IONIZED CALCIUM FOR CHEM 8: 1.12 MMOL/L (ref 1.12–1.32)
KETONES UR STRIP-MCNC: NEGATIVE MG/DL
LEUKOCYTE ESTERASE UR QL STRIP: NEGATIVE
LYMPHOCYTES # BLD AUTO: 1.26 X10(3) UL (ref 1–4)
LYMPHOCYTES # BLD AUTO: 1.5 X10ˆ3/UL (ref 1–4)
LYMPHOCYTES NFR BLD AUTO: 11.4 %
LYMPHOCYTES NFR BLD AUTO: 13.2 %
MCH RBC QN AUTO: 29.8 PG (ref 26–34)
MCH RBC QN AUTO: 30.7 PG (ref 26–34)
MCHC RBC AUTO-ENTMCNC: 32.3 G/DL (ref 31–37)
MCHC RBC AUTO-ENTMCNC: 34.4 G/DL (ref 31–37)
MCV RBC AUTO: 89.3 FL (ref 80–100)
MCV RBC AUTO: 92.2 FL (ref 80–100)
MIXED CELL %: 9.2 %
MONOCYTES # BLD AUTO: 1.13 X10(3) UL (ref 0.1–1)
MONOCYTES NFR BLD AUTO: 10.2 %
MORPHOLOGY: NORMAL
NEUTROPHILS # BLD AUTO: 8.39 X10 (3) UL (ref 1.5–7.7)
NEUTROPHILS # BLD AUTO: 8.39 X10(3) UL (ref 1.5–7.7)
NEUTROPHILS # BLD AUTO: 9.1 X10ˆ3/UL (ref 1.5–7.7)
NEUTROPHILS NFR BLD AUTO: 75.8 %
NEUTROPHILS NFR BLD AUTO: 77.6 %
NITRITE UR QL STRIP: NEGATIVE
PH UR STRIP: 6 [PH]
POTASSIUM BLD-SCNC: 4.5 MMOL/L (ref 3.6–5.1)
PROT UR STRIP-MCNC: NEGATIVE MG/DL
RBC # BLD AUTO: 4.47 X10ˆ6/UL (ref 3.8–5.3)
RBC # BLD AUTO: 4.56 X10(6)UL (ref 3.8–5.3)
SODIUM BLD-SCNC: 137 MMOL/L (ref 136–145)
SP GR UR STRIP: <=1.005
UROBILINOGEN UR STRIP-ACNC: <2 MG/DL
WBC # BLD AUTO: 11.1 X10(3) UL (ref 4–11)
WBC # BLD AUTO: 11.7 X10ˆ3/UL (ref 4–11)

## 2025-05-08 PROCEDURE — 99214 OFFICE O/P EST MOD 30 MIN: CPT

## 2025-05-08 PROCEDURE — 99215 OFFICE O/P EST HI 40 MIN: CPT

## 2025-05-08 PROCEDURE — 85025 COMPLETE CBC W/AUTO DIFF WBC: CPT | Performed by: EMERGENCY MEDICINE

## 2025-05-08 PROCEDURE — 81002 URINALYSIS NONAUTO W/O SCOPE: CPT

## 2025-05-08 PROCEDURE — 74177 CT ABD & PELVIS W/CONTRAST: CPT | Performed by: EMERGENCY MEDICINE

## 2025-05-08 PROCEDURE — 80047 BASIC METABLC PNL IONIZED CA: CPT

## 2025-05-08 PROCEDURE — 36415 COLL VENOUS BLD VENIPUNCTURE: CPT

## 2025-05-08 RX ORDER — TRAMADOL HYDROCHLORIDE 50 MG/1
TABLET ORAL EVERY 6 HOURS PRN
Qty: 8 TABLET | Refills: 0 | Status: SHIPPED | OUTPATIENT
Start: 2025-05-08

## 2025-05-08 RX ORDER — CIPROFLOXACIN 500 MG/1
500 TABLET, FILM COATED ORAL 2 TIMES DAILY
Qty: 20 TABLET | Refills: 0 | Status: SHIPPED | OUTPATIENT
Start: 2025-05-08 | End: 2025-05-18

## 2025-05-08 RX ORDER — METRONIDAZOLE 500 MG/1
500 TABLET ORAL 3 TIMES DAILY
Qty: 30 TABLET | Refills: 0 | Status: SHIPPED | OUTPATIENT
Start: 2025-05-08 | End: 2025-05-18

## 2025-05-08 NOTE — ED INITIAL ASSESSMENT (HPI)
Patient c/o lower abdominal pain since Tuesday with low grade fever developing Wednesday evening. Patient reports having diverticulitis x 3 in the past and IBS,  Patient tearful reports witnessing a traumatic event that has upset her.

## 2025-05-08 NOTE — ED PROVIDER NOTES
Patient Seen in: [facility]      History     Chief Complaint   Patient presents with    Abdomen/Flank Pain    Fever     Stated Complaint: Lower gut pain    Subjective:   HPI    79-year-old female past medical history as below including history of diverticulitis presents with left lower quadrant abdominal pain.  Started Tuesday.  Feels similar to previous episodes of diverticulitis.  No vomiting or diarrhea.  No urinary symptoms.  History of Present Illness               Objective:     Past Medical History:    Abrasions of multiple sites    Recorded Elsewhere: No; Location: [facility]; Source: EHR      Age-related cataract    Anxiety    Change in hair    Chest pain    admitt cp normal w/u    Depression    Dermatofibroma    Diarrhea, unspecified    Disorder of bone and articular cartilage    Recorded Elsewhere: No; Location: [facility]; Source: EHR      Essential hypertension    Hearing loss    Heart murmur    Heart palpitations    Hernia, hiatal    High blood pressure    History of cardiac cath    normal    History of cardiac murmur    History of eating disorder    Hives    Irregular bowel habits    Osteoarthrosis involving lower leg    Primary osteoarthritis of first carpometacarpal joint of right hand    Stress    Tachycardia              Past Surgical History:   Procedure Laterality Date    Colonoscopy  2001, 1/12, 10/17    Colonoscopy N/A 10/19/2017    Procedure: COLONOSCOPY, POSSIBLE BIOPSY, POSSIBLE POLYPECTOMY 12596;  Surgeon: Xavier Hampton MD;  Location: [facility]    Correct bunion,simple      B/L    Egd  01/2012    Esophageal motility study  1/12    Hemorrhoidectomy  1987    Hernia surgery      2013    Other surgical history  3/4/13    [facility] - Dr. Okeefe/ lap repair diag hernia, lap nissen fundoplication    Other surgical history  may 23 2014    Hiatel hernia repair by Dr. Okeefe (open)    Ventral hernia repair N/A 3/26/2015    Procedure: HERNIA  VENTRAL REPAIR;  Surgeon: Jim Okeefe MD;  Location:  MAIN OR                Social History     Socioeconomic History    Marital status:    Tobacco Use    Smoking status: Never    Smokeless tobacco: Never   Vaping Use    Vaping status: Never Used   Substance and Sexual Activity    Alcohol use: Yes     Comment: occ~ \"maybe once or twice per month\"    Drug use: No   Other Topics Concern    Caffeine Concern Yes     Comment: 8-12 oz. of coffee daily.    Exercise Yes     Comment: Walks her dog daily and yoga     Social Drivers of Health     Food Insecurity: No Food Insecurity (4/24/2025)    NCSS - Food Insecurity     Worried About Running Out of Food in the Last Year: No     Ran Out of Food in the Last Year: No   Transportation Needs: No Transportation Needs (4/24/2025)    NCSS - Transportation     Lack of Transportation: No   Housing Stability: Not At Risk (4/24/2025)    NCSS - Housing/Utilities     Has Housing: Yes     Worried About Losing Housing: No     Unable to Get Utilities: No              Review of Systems    Positive for stated complaint: Lower gut pain  Other systems are as noted in HPI.  Constitutional and vital signs reviewed.      All other systems reviewed and negative except as noted above.                  Physical Exam     ED Triage Vitals   BP 05/08/25 1536 132/78   Pulse 05/08/25 1536 68   Resp 05/08/25 1536 22   Temp 05/08/25 1658 100 °F (37.8 °C)   Temp src 05/08/25 1658 Oral   SpO2 05/08/25 1536 100 %   O2 Device 05/08/25 1536 None (Room air)       Current Vitals:   Vital Signs  BP: 132/78  Pulse: 68  Resp: 22  Temp: 100 °F (37.8 °C)  Temp src: Oral    Oxygen Therapy  SpO2: 100 %  O2 Device: None (Room air)        Physical Exam  Constitutional:       General: Is not in acute distress.     Appearance: Is not ill-appearing.   HENT:      Head: Normocephalic and atraumatic.      Mouth/Throat:      Mouth: Mucous membranes are moist.   Eyes:      Conjunctiva/sclera: Conjunctivae normal.       Pupils: Pupils are equal, round, and reactive to light.   Cardiovascular:      Rate and Rhythm: Normal rate and regular rhythm.   Pulmonary:      Effort: Pulmonary effort is normal. No respiratory distress.   Abdominal:      General: Abdomen is flat. There is no distension.      Tenderness: Mild left lower quadrant tenderness  Musculoskeletal:      Right lower leg: No edema.      Left lower leg: No edema.   Skin:     General: Skin is warm and dry.   Neurological:      General: No focal deficit present.      Mental Status: Is alert.     Physical Exam                ED Course     Labs Reviewed   POCT CBC - Abnormal; Notable for the following components:       Result Value    WBC IC 11.7 (*)     # Neutrophil 9.1 (*)     # Mixed Cells 1.1 (*)     All other components within normal limits   POCT ISTAT CHEM8 CARTRIDGE - Abnormal; Notable for the following components:    ISTAT Creatinine 1.10 (*)     eGFR-Cr 51 (*)     All other components within normal limits   CBC W AUTO DIFF   [facility] POCT URINALYSIS DIPSTICK          Results                           MDM   Considered all emergent etiologies, differential includes but is not limited to: Diverticulitis, UTI, kidney stone     I have independently visualized the radiology images, my focus/limited interpretation: CT scan with diverticulitis without abscess     Defer to radiologist for other/incidental findings    Labs with mild leukocytosis.  CT scan with uncomplicated diverticulitis.  Started on Cipro Flagyl.  Discussed red flags and return precautions.        Medical Decision Making      Disposition and Plan     Clinical Impression:  1. Acute diverticulitis         Disposition:  Discharge  5/8/2025  5:10 pm    Follow-up:  Flakita Anders MD  [address] [facility] [[email or web address]] [address]  [facility] [address]  [phone]                Medications Prescribed:  Current Discharge Medication List        START taking these medications    Details   ciprofloxacin 500 MG Oral Tab Take 1 tablet (500 mg  total) by mouth 2 (two) times daily for 10 days.  Qty: 20 tablet, Refills: 0      metroNIDAZOLE 500 MG Oral Tab Take 1 tablet (500 mg total) by mouth 3 (three) times daily for 10 days.  Qty: 30 tablet, Refills: 0      traMADol 50 MG Oral Tab Take 1-2 tablets ( mg total) by mouth every 6 (six) hours as needed for Pain.  Qty: 8 tablet, Refills: 0    Associated Diagnoses: Acute diverticulitis             Supplementary Documentation:

## 2025-05-29 ENCOUNTER — OFFICE VISIT (OUTPATIENT)
Facility: LOCATION | Age: 80
End: 2025-05-29
Payer: MEDICARE

## 2025-05-29 DIAGNOSIS — H90.3 SENSORINEURAL HEARING LOSS, ASYMMETRICAL: Primary | ICD-10-CM

## 2025-05-29 DIAGNOSIS — D33.3 RIGHT ACOUSTIC NEUROMA (HCC): Primary | ICD-10-CM

## 2025-05-29 PROCEDURE — 92567 TYMPANOMETRY: CPT | Performed by: AUDIOLOGIST

## 2025-05-29 PROCEDURE — 92557 COMPREHENSIVE HEARING TEST: CPT | Performed by: AUDIOLOGIST

## 2025-05-29 PROCEDURE — 1159F MED LIST DOCD IN RCRD: CPT | Performed by: OTOLARYNGOLOGY

## 2025-05-29 PROCEDURE — 99213 OFFICE O/P EST LOW 20 MIN: CPT | Performed by: OTOLARYNGOLOGY

## 2025-05-29 PROCEDURE — 1160F RVW MEDS BY RX/DR IN RCRD: CPT | Performed by: OTOLARYNGOLOGY

## 2025-05-29 NOTE — PROGRESS NOTES
Yue Browne is a 79 year old female.   Chief Complaint   Patient presents with    Hearing Check     HPI:   She has a history of right acoustic neuroma.  It has been followed for probably 14 years.  She has no vertigo or tinnitus or ear pain.    REVIEW OF SYSTEMS:   GENERAL HEALTH: feels well otherwise  GENERAL : denies fever, chills, sweats, weight loss, weight gain  SKIN: denies any unusual skin lesions or rashes  RESPIRATORY: denies shortness of breath with exertion  NEURO: denies headaches    EXAM:   LMP 03/16/2000 (Approximate)     System Findings Details   Constitutional  Overall appearance - Normal.   Psychiatric  Orientation - Oriented to time, place, person & situation. Appropriate mood and affect.   Head/Face  Facial features -- Normal. Skull - Normal.   Eyes  Pupils equal ,round ,react to light and accomidate   Ears, Nose, Throat, Neck  Ears clear no fluid or infection   Neurological  Memory - Normal. Cranial nerves - Cranial nerves II through XII grossly intact.   Lymph Detail  Submental. Submandibular. Anterior cervical. Posterior cervical. Supraclavicular.     Latest Audiogram Result (Hz) Exam performed: 5/29/2025 2:26 PM Last edited by Dolly Pastor Au.D on 5/29/2025 3:04 PM        125 250  1500 2000 3000 4000 6000 8000    Right air:  50 55  65   85  100N 95N    Left air:  15 15  20 25 30 60 60 85 85    Right air (masked):      80 85  120      Left mastoid bone:   5  10  40  50      Right mastoid bone (masked):   40  55  85  85N         Reliability:  Good    Transducer:  Inserts    Technique:  Conventional Audiometry    Comments:            Latest Speech Audiometry  Last edited by Dolly Pastor Au.D on 5/29/2025 2:59 PM       Ear Method PTA SAT SRT [facility] Test/list Score (%) Intensity Mask/noise Notes    right live voice   60   10 By Difficulty 20 95 65     left live voice   20   10 By Difficulty 100 75                    Latest Tympanogram Result       Probe Tone  (Hz): Unknown Exam performed: 5/29/2025 2:26 PM Last edited by Dolly Pastor Au.D on 5/29/2025 3:04 PM      Tympanograms  These were drawn by a user, not generated from device data      Right Ear Left Ear                     Right Ear Left Ear    Tympanogram type: Type A Type A    Canal volume (mL): 1.28 1.06    Peak pressure (daPa): -90 -64    Peak amplitude (mL): 0.84 1.38    Tympanogram width (daPa):        Comments:                    Latest Audiogram and Tympanogram Result Text  Last edited by Dolly Pastor Au.D on 5/29/2025  3:03 PM      Study Result                 Narrative & Impression  Patient complained of increased difficulty understanding conversation    Audiogram: Right moderate to profound essentially sensorineural hearing loss 250-8000 Hz (mild conductive component at 500 Hz in the right ear). Left wnl to mild to severe sensorineural hearing loss 250-8000 Hz.    Word Recognition Score in Quiet: Right excellent. Left markedly reduced.    Tympanometry: WNL, bilaterally.    In comparison with previous testing on 03/02/2023, there was significant progression of the right hearing loss at 250 & 500 Hz. Left hearing loss was stable.     Recommend: Follow up with ENT & consider a trial with binaural amplification for better understanding of conversation, pending physician medical clearance.    Mee Montoya                   ASSESSMENT AND PLAN:   1. Right acoustic neuroma (HCC)  Audiogram shows asymmetric hearing loss associated with right acoustic neuroma.  He is medically cleared for hearing aids.  She had MRI scan in 2023 which showed a fairly stable acoustic neuroma over the years.  She will undergo a repeat MRI scan in January and then we will speak after the above.  - MRI IACS (W+WO) (CPT=70553); Future      The patient indicates understanding of these issues and agrees to the plan.    No follow-ups on file.    Parker Leger MD  5/29/2025  5:44 PM

## 2025-05-29 NOTE — PROGRESS NOTES
Yue Browne was seen for an audiometric evaluation and tympanogram today. Referred back to physician.    Dolly Pastor, AuD

## 2025-06-01 DIAGNOSIS — I10 PRIMARY HYPERTENSION: ICD-10-CM

## 2025-06-01 DIAGNOSIS — I10 ESSENTIAL HYPERTENSION: ICD-10-CM

## 2025-06-01 RX ORDER — METOPROLOL TARTRATE 50 MG
50 TABLET ORAL DAILY
Qty: 90 TABLET | Refills: 0 | Status: SHIPPED | OUTPATIENT
Start: 2025-06-01

## 2025-06-16 DIAGNOSIS — I10 ESSENTIAL HYPERTENSION: ICD-10-CM

## 2025-06-16 DIAGNOSIS — I10 PRIMARY HYPERTENSION: ICD-10-CM

## 2025-06-17 RX ORDER — METOPROLOL TARTRATE 50 MG
50 TABLET ORAL DAILY
Qty: 90 TABLET | Refills: 0 | OUTPATIENT
Start: 2025-06-17

## 2025-06-21 DIAGNOSIS — I10 PRIMARY HYPERTENSION: ICD-10-CM

## 2025-06-21 DIAGNOSIS — I10 ESSENTIAL HYPERTENSION: ICD-10-CM

## 2025-06-21 RX ORDER — METOPROLOL TARTRATE 50 MG
50 TABLET ORAL DAILY
Qty: 90 TABLET | Refills: 0 | Status: SHIPPED | OUTPATIENT
Start: 2025-06-21

## 2025-07-27 DIAGNOSIS — F41.1 GENERALIZED ANXIETY DISORDER: ICD-10-CM

## 2025-07-27 DIAGNOSIS — F33.9 EPISODE OF RECURRENT MAJOR DEPRESSIVE DISORDER, UNSPECIFIED DEPRESSION EPISODE SEVERITY: ICD-10-CM

## 2025-07-27 DIAGNOSIS — E78.5 HYPERLIPIDEMIA, UNSPECIFIED HYPERLIPIDEMIA TYPE: ICD-10-CM

## 2025-07-28 RX ORDER — EZETIMIBE 10 MG/1
10 TABLET ORAL EVERY EVENING
Qty: 90 TABLET | Refills: 0 | Status: SHIPPED | OUTPATIENT
Start: 2025-07-28

## 2025-07-28 RX ORDER — BUPROPION HYDROCHLORIDE 150 MG/1
150 TABLET, EXTENDED RELEASE ORAL DAILY
Qty: 90 TABLET | Refills: 0 | Status: SHIPPED | OUTPATIENT
Start: 2025-07-28

## 2025-07-28 NOTE — TELEPHONE ENCOUNTER
Bupropion  mg  Filled 4-24-25  Qty 90  0 refills  Future Appointments   Date Time Provider Department Center   7/29/2025  4:00 PM Nadine Juarez LCSW, HENRRYC [facility] [place]   8/5/2025  4:00 PM Nadine Juarez LCSW, CADC [facility] [place]   2/23/2026  2:00 PM Wily Johnson MD [facility]   LOV 4-24-25 SM    Ezetimibe 10 mg  Filled 4-24-25  Qty 90  0 refills  Future Appointments   Date Time Provider Department Center   7/29/2025  4:00 PM Nadine Juarez LCSW, RODY [facility] [place]   8/5/2025  4:00 PM Nadine Juarez LCSW, CADC [facility] [place]   2/23/2026  2:00 PM Wily Johnson MD [facility] [place]   LOV 4-24-25 SM  Labs 8-26-24 Lipid

## 2025-07-29 ENCOUNTER — LAB ENCOUNTER (OUTPATIENT)
Dept: LAB | Age: 80
End: 2025-07-29
Attending: FAMILY MEDICINE

## 2025-07-29 DIAGNOSIS — R73.01 IMPAIRED FASTING GLUCOSE: ICD-10-CM

## 2025-07-29 DIAGNOSIS — E78.5 HYPERLIPIDEMIA, UNSPECIFIED HYPERLIPIDEMIA TYPE: ICD-10-CM

## 2025-07-29 LAB
ALBUMIN SERPL-MCNC: 4.5 G/DL (ref 3.2–4.8)
ALBUMIN/GLOB SERPL: 2.3 (ref 1–2)
ALP LIVER SERPL-CCNC: 64 U/L (ref 55–142)
ALT SERPL-CCNC: 19 U/L (ref 10–49)
ANION GAP SERPL CALC-SCNC: 9 MMOL/L (ref 0–18)
AST SERPL-CCNC: 23 U/L (ref ?–34)
BILIRUB SERPL-MCNC: 0.6 MG/DL (ref 0.2–1.1)
BUN BLD-MCNC: 15 MG/DL (ref 9–23)
CALCIUM BLD-MCNC: 9.8 MG/DL (ref 8.7–10.6)
CHLORIDE SERPL-SCNC: 109 MMOL/L (ref 98–112)
CHOLEST SERPL-MCNC: 200 MG/DL (ref ?–200)
CO2 SERPL-SCNC: 25 MMOL/L (ref 21–32)
CREAT BLD-MCNC: 1.11 MG/DL (ref 0.55–1.02)
EGFRCR SERPLBLD CKD-EPI 2021: 50 ML/MIN/1.73M2 (ref 60–?)
EST. AVERAGE GLUCOSE BLD GHB EST-MCNC: 120 MG/DL (ref 68–126)
FASTING PATIENT LIPID ANSWER: YES
FASTING STATUS PATIENT QL REPORTED: YES
GLOBULIN PLAS-MCNC: 2 G/DL (ref 2–3.5)
GLUCOSE BLD-MCNC: 103 MG/DL (ref 70–99)
HBA1C MFR BLD: 5.8 % (ref ?–5.7)
HDLC SERPL-MCNC: 66 MG/DL (ref 40–59)
LDLC SERPL CALC-MCNC: 108 MG/DL (ref ?–100)
NONHDLC SERPL-MCNC: 134 MG/DL (ref ?–130)
OSMOLALITY SERPL CALC.SUM OF ELEC: 297 MOSM/KG (ref 275–295)
POTASSIUM SERPL-SCNC: 4.5 MMOL/L (ref 3.5–5.1)
PROT SERPL-MCNC: 6.5 G/DL (ref 5.7–8.2)
SODIUM SERPL-SCNC: 143 MMOL/L (ref 136–145)
TRIGL SERPL-MCNC: 149 MG/DL (ref 30–149)
VLDLC SERPL CALC-MCNC: 25 MG/DL (ref 0–30)

## 2025-07-29 PROCEDURE — 80061 LIPID PANEL: CPT

## 2025-07-29 PROCEDURE — 83036 HEMOGLOBIN GLYCOSYLATED A1C: CPT

## 2025-07-29 PROCEDURE — 36415 COLL VENOUS BLD VENIPUNCTURE: CPT

## 2025-07-29 PROCEDURE — 80053 COMPREHEN METABOLIC PANEL: CPT

## 2025-08-22 DIAGNOSIS — G47.00 INSOMNIA, UNSPECIFIED TYPE: ICD-10-CM

## 2025-08-22 RX ORDER — TEMAZEPAM 30 MG/1
30 CAPSULE ORAL NIGHTLY PRN
Qty: 90 CAPSULE | Refills: 0 | Status: SHIPPED | OUTPATIENT
Start: 2025-08-22

## (undated) DIAGNOSIS — R00.1 BRADYCARDIA: Primary | ICD-10-CM

## (undated) DIAGNOSIS — R73.09 ELEVATED GLUCOSE: ICD-10-CM

## (undated) DIAGNOSIS — Z01.812 ENCOUNTER FOR PREPROCEDURE SCREENING LABORATORY TESTING FOR COVID-19: Primary | ICD-10-CM

## (undated) DIAGNOSIS — E78.5 HYPERLIPIDEMIA, UNSPECIFIED HYPERLIPIDEMIA TYPE: ICD-10-CM

## (undated) DIAGNOSIS — I27.20 PULMONARY HYPERTENSION (HCC): ICD-10-CM

## (undated) DIAGNOSIS — Z20.822 SUSPECTED COVID-19 VIRUS INFECTION: ICD-10-CM

## (undated) DIAGNOSIS — I10 ESSENTIAL HYPERTENSION: ICD-10-CM

## (undated) DIAGNOSIS — E55.9 VITAMIN D DEFICIENCY: ICD-10-CM

## (undated) DIAGNOSIS — Z20.822 ENCOUNTER FOR PREPROCEDURE SCREENING LABORATORY TESTING FOR COVID-19: Primary | ICD-10-CM

## (undated) DIAGNOSIS — E78.5 HYPERLIPIDEMIA, UNSPECIFIED HYPERLIPIDEMIA TYPE: Primary | ICD-10-CM

## (undated) DIAGNOSIS — R42 POSITIONAL LIGHTHEADEDNESS: ICD-10-CM

## (undated) DIAGNOSIS — I10 PRIMARY HYPERTENSION: ICD-10-CM

## (undated) DIAGNOSIS — R79.9 ABNORMAL SERUM TOTAL PROTEIN LEVEL: ICD-10-CM

## (undated) NOTE — LETTER
[facility] IN [place]  [address] [address]  Dept: [phone]  Dept Fax: [phone]      March 6, 2017    Patient: Jimena Chavez   Date of Visit: 3/6/2017       To Whom It May Concern:    Jimena mendoza

## (undated) NOTE — LETTER
06/15/18        Kimberly Alonzo  [address]  [address]      Dear Marysol Calvert records indicate that you have outstanding lab work and or testing that was ordered for you and has not yet been completed:          COMP METABOLIC PA

## (undated) NOTE — MR AVS SNAPSHOT
After Visit Summary   6/15/2017    Lilly Oates    MRN: [id number]           Diagnoses this Visit     Idiopathic urticaria    -  Primary       Allergies     Ampicillin-Sulbactam Sodium Rash    Darvocet [Propoxyphene N-Apap] Other (See Comm Thursday July 13, 2017 1:30 PM     Appointment with [facility] [place] [facility] ([phone])   [id number]  [address] [place] [address] [address] can access your MyChart to more actively m

## (undated) NOTE — LETTER
Date & Time: 1/21/2019, 8:01 PM  Patient: Leonardo Barajas  Encounter Provider(s):    HOANG Francis       To Whom It May Concern:    Leonardo Barajas was seen and treated in our department on 1/21/2019.  She can return to work but is no

## (undated) NOTE — LETTER
[facility] IN [place]  [address] [address]  Dept: [phone]  Dept Fax: [phone]      January 9, 2018    Patient: Tara Berry   Date of Visit: 1/9/2018       To Whom It May Concern:    Tara Berry

## (undated) NOTE — MR AVS SNAPSHOT
After Visit Summary   3/15/2017    Pina Curiel    MRN: [id number]           Diagnoses this Visit     Idiopathic urticaria    -  Primary       Allergies     Ampicillin-Sulbactam Sodium Rash    Darvocet [Propoxyphene N-Apap] Other (See Comm

## (undated) NOTE — MR AVS SNAPSHOT
[place]  [address]  [address] [address] [phone]               Thank you for choosing us for your health care visit with Blaire Springer NP.   We are glad to serve you and happy to provide you with this sum TAKE ONE CAPSULE BY MOUTH EVERY DAY AS NEEDED FOR ITCHING   Commonly known as:  VISTARIL           Levocetirizine Dihydrochloride 5 MG Tabs   Take 5 mg by mouth every evening.    Commonly known as:  XYZAL           MAGNESIUM OR   Take 1 capsule by mouth debra Call [phone] for help. [employer] is NOT to be used for urgent needs. For medical emergencies, dial 911.            Visit [facility] online at  [email or web address]

## (undated) NOTE — LETTER
Date & Time: 2/18/2020, 9:04 PM  Patient: Caleb Danielson  Encounter Provider(s):    Guanaco James MD       To Whom It May Concern:    Caleb Danielson was seen and treated in our department on 2/18/2020.  She should not return to work [employer]

## (undated) NOTE — LETTER
02/21/19        Mitch Alonzo  [address]  [address]      Dear Citlalli Burroughs records indicate that you have outstanding lab work and or testing that was ordered for you and has not yet been completed: Fasting lab work  To compl

## (undated) NOTE — MR AVS SNAPSHOT
[place]  [address]  [address] [address] [phone]               Thank you for choosing us for your health care visit with Angelika Chong NP.   We are glad to serve you and happy to provide you with this sum This list is accurate as of: 5/4/17  4:56 PM.  Always use your most recent med list.                aspirin 81 MG Chew   Chew 81 mg by mouth daily. BuPROPion HCl ER (SR) 150 MG Tb12   Take 150 mg by mouth daily.    Commonly known as:  Amada Pepe discharge instructions in [employer] by going to Visits < Admission Summaries. If you've been to the Emergency Department or your doctor's office, you can view your past visit information in [employer] by going to Visits < Visit Summaries. [employer] questions?

## (undated) NOTE — LETTER
Date & Time: 2/18/2019, 6:58 PM  Patient: Tia Dean  Encounter Provider(s):    HERB Brian       To Whom It May Concern:    Tia Dean was seen and treated in our department on 2/18/2019.  She should not return to work

## (undated) NOTE — LETTER
[facility] CARE IN [place]  [id number] [address]  Dept: [phone]  Dept Fax: [phone]      January 9, 2018    Patient: Rocky De La Cruz   Date of Visit: 1/9/2018       To Whom It May Concern:    Rocky De La Cruz

## (undated) NOTE — ED AVS SNAPSHOT
Sarah Herrera   MRN: [id number]    Department:  [facility] Emergency Department   Date of Visit:  12/24/2019           Disclosure     Insurance plans vary and the physician(s) referred by the ER may not be covered by your plan.  Please con tell this physician (or your personal doctor if your instructions are to return to your personal doctor) about any new or lasting problems. The primary care or specialist physician will see patients referred from the [facility] Emergency Department.  Arthor Bernheim

## (undated) NOTE — LETTER
8/5/2018  Cole Alonzo  [address] [place] [address]    Dear Tianna,       Here are your results from your recent visit with Gastroenterology.      Your blood testing was negative for celiac disease: an allergy to gluten which is primari

## (undated) NOTE — LETTER
06/01/21        Philly Alonzo  [address]  [address]      Dear Ricky Leal records indicate that you have outstanding lab work and or testing that was ordered for you and has not yet been completed:  Orders Placed This Encoun

## (undated) NOTE — MR AVS SNAPSHOT
After Visit Summary   4/13/2017    Sonia Syed    MRN: [id number]           Diagnoses this Visit     Idiopathic urticaria    -  Primary       Allergies     Ampicillin-Sulbactam Sodium Rash    Darvocet [Propoxyphene N-Apap] Other (See Comm

## (undated) NOTE — MR AVS SNAPSHOT
[place]  [address]  [address] [address] [phone]               Thank you for choosing us for your health care visit with Kimi Pichardo NP.   We are glad to serve you and happy to provide you with this sum Commonly known as:  ALLEGRA           FISH OIL OR   Take 1-2 capsules by mouth daily. FOLIC ACID OR   Take by mouth.            HydrOXYzine Pamoate 25 MG Caps   TAKE ONE CAPSULE BY MOUTH EVERY DAY AS NEEDED FOR ITCHING   Commonly known as:  NAVA For medical emergencies, dial 911.            Visit [facility] online at  [email or web address]

## (undated) NOTE — ED AVS SNAPSHOT
[facility] in [address]    [address] [place] [address]    Phone:  [phone]    Fax:  [phone]           Lilly Oates   MRN: [id number]    Department:  [facility] Immediate Care in [address] [place] [address],[address]   Date of Visit:  3/6/20 Phone:  [phone]    - Ciprofloxacin HCl 500 MG Tabs  - metRONIDAZOLE 500 MG Tabs              Discharge Instructions       Slowly progress diet from clear liquids as discussed. Monitor closely for worsening pain, fever or overall condition.   Report primary care or a specialist physician for a follow-up visit, please tell this physician (or your personal doctor if your instructions are to return to your personal doctor) about any new or lasting problems.  The primary care or specialist physician will s We are concerned for your overall well being:    - If you are a smoker or have smoked in the last 12 months, we encourage you to explore options for quitting.     - If you have concerns related to behavioral health issues or thoughts of harming yourself, BILIARY:  Normal.  No visible dilatation or calcification. PANCREAS:  Normal.  No lesion, fluid collection, ductal dilatation, or atrophy. SPLEEN:  Normal.  No enlargement or focal lesion. KIDNEYS:  Normal.  No mass, obstruction, or calcification.

## (undated) NOTE — LETTER
[facility] CARE IN [place]  [address] [address]  Dept: [phone]  Dept Fax: [phone]      January 24, 2018    Patient: Keisha Jennifer   Date of Visit: 1/24/2018       To Whom It May Concern:    Keisha Rodriguez

## (undated) NOTE — LETTER
Dear Patient,  Please note that [facility] (“[facility] (“ENT”) does not sell hearing aid devices/supplies.  The audiologists that are overseeing your care today are employed at [facility] as well at [employer].  [employer] is an independent audiology company that sells hearing aid devices and supplies.  The company is not owned or affiliated with [facility].  [employer] is located at:  [facility]  [address], [address]  [place], [place] [address] [phone]  Your audiologist is recommending that you could benefit from a hearing aid device and/or supplies.  If you decide to purchase a hearing aid device or supplies from [employer], the audiologists will receive a financial benefit from this sale.    As a patient and a consumer, you have the option to do your own research to find the most appropriate audiology vendor.  There are local and national audiology vendors that sell comparable products and services.  In addition to [employer], we have provided you with a preliminary list of audiology vendors that will allow you to start the due diligence process.   [employer]  [address], [address]  [place], [place] [address] [phone]  [facility]  [address], [address]  [place], [place] [id number] [phone]  [facility]  [address], [address]  [place], [place] [address] [phone]  *Accepts Public Aid  [facility]  [address]  [place], [place], [address]  [phone]    Please note that your choice of an audiology vendor will not affect the relationship you have with your audiologist in any capacity.

## (undated) NOTE — ED AVS SNAPSHOT
[facility] in [address] [place] [address] Drive,4Th Floor    [address] [place] [address]    Phone:  [phone]    Fax:  [phone]           Yvette Alonso   MRN: [id number]    Department:  [facility] in [facility] [place]   Date of Visit:  6/20/2 may not be covered by your plan. Please contact your insurance company to determine coverage for follow-up care and referrals. [facility] Care  [address] [facility], [address] [place] [address]  [phone] [address]  [address] prescription right away and begin taking the medication(s) as directed. If the Immediate Care Provider has read X-rays, these will be re-interpreted by a radiologist.  If there is a significant change in your reading, you will be contacted.  Please make Medicaid plans. To get signed up and covered, please call [phone] and ask to get set up for an insurance coverage that is in-network with Lucia Montes.         Imaging Results         XR ELBOW, COMPLETE (MIN 3 VIEWS), LEFT (CPT=73080) (In